# Patient Record
Sex: FEMALE | Race: WHITE | NOT HISPANIC OR LATINO | Employment: UNEMPLOYED | URBAN - NONMETROPOLITAN AREA
[De-identification: names, ages, dates, MRNs, and addresses within clinical notes are randomized per-mention and may not be internally consistent; named-entity substitution may affect disease eponyms.]

---

## 2017-02-09 ENCOUNTER — OFFICE VISIT (OUTPATIENT)
Dept: FAMILY MEDICINE CLINIC | Facility: CLINIC | Age: 62
End: 2017-02-09

## 2017-02-09 VITALS
HEIGHT: 65 IN | WEIGHT: 190 LBS | BODY MASS INDEX: 31.65 KG/M2 | DIASTOLIC BLOOD PRESSURE: 86 MMHG | OXYGEN SATURATION: 97 % | TEMPERATURE: 98.5 F | HEART RATE: 71 BPM | SYSTOLIC BLOOD PRESSURE: 146 MMHG

## 2017-02-09 DIAGNOSIS — B37.2 CANDIDIASIS, INTERTRIGINOUS: Primary | ICD-10-CM

## 2017-02-09 DIAGNOSIS — E03.4 HYPOTHYROIDISM DUE TO ACQUIRED ATROPHY OF THYROID: ICD-10-CM

## 2017-02-09 DIAGNOSIS — I10 BENIGN ESSENTIAL HYPERTENSION: ICD-10-CM

## 2017-02-09 DIAGNOSIS — E78.2 MIXED HYPERLIPIDEMIA: ICD-10-CM

## 2017-02-09 PROCEDURE — 99214 OFFICE O/P EST MOD 30 MIN: CPT | Performed by: PHYSICIAN ASSISTANT

## 2017-02-09 PROCEDURE — 90715 TDAP VACCINE 7 YRS/> IM: CPT | Performed by: PHYSICIAN ASSISTANT

## 2017-02-09 PROCEDURE — 90471 IMMUNIZATION ADMIN: CPT | Performed by: PHYSICIAN ASSISTANT

## 2017-02-09 RX ORDER — NYSTATIN 100000 U/G
CREAM TOPICAL 2 TIMES DAILY
Qty: 30 G | Refills: 2 | Status: SHIPPED | OUTPATIENT
Start: 2017-02-09 | End: 2017-10-30

## 2017-02-09 RX ORDER — BUPROPION HYDROCHLORIDE 150 MG/1
150 TABLET, EXTENDED RELEASE ORAL DAILY
COMMUNITY
End: 2017-06-09 | Stop reason: SDUPTHER

## 2017-02-09 RX ORDER — HYDROXYZINE PAMOATE 50 MG/1
50 CAPSULE ORAL 3 TIMES DAILY PRN
COMMUNITY
End: 2017-06-09 | Stop reason: SDUPTHER

## 2017-02-10 NOTE — PROGRESS NOTES
Kasandra Melchor is a 61 y.o. female.     History of Present Illness     Rash-  She complains of a rash under each breast that is worse in the winter.  Onset was one week ago.    Dyslipidemia  Compliance with treatment has been good. The patient exercises infrequently. She is currently being prescribed the following medication for her dyslipidemia - Zocor (simvastatin). Patient denies side effects associated with her medications. Most recent lipids include  No results found for: TRIG, HDL, LDLCALC, LDL  Stable    Hypertension-well controlled    Hypothyroidism-stable with Synthroid 100 µg daily, asymptomatic    The following portions of the patient's history were reviewed and updated as appropriate: allergies, current medications, past family history, past medical history, past social history, past surgical history and problem list.    Review of Systems   Constitutional: Negative for activity change, appetite change and fever.   HENT: Negative for ear pain, sinus pressure and sore throat.    Eyes: Negative for pain and visual disturbance.   Respiratory: Negative for cough and chest tightness.    Cardiovascular: Negative for chest pain and palpitations.   Gastrointestinal: Negative for abdominal pain, constipation, diarrhea, nausea and vomiting.   Endocrine: Negative for polydipsia and polyuria.   Genitourinary: Negative for dysuria and frequency.   Musculoskeletal: Negative for back pain and myalgias.   Skin: Positive for rash. Negative for color change.   Allergic/Immunologic: Negative for food allergies and immunocompromised state.   Neurological: Negative for dizziness, syncope and headaches.   Hematological: Negative for adenopathy. Does not bruise/bleed easily.   Psychiatric/Behavioral: Negative for hallucinations and suicidal ideas. The patient is not nervous/anxious.        Objective   Physical Exam   Constitutional: She is oriented to person, place, and time. She appears well-developed and  well-nourished.   HENT:   Head: Normocephalic and atraumatic.   Nose: Nose normal.   Mouth/Throat: Oropharynx is clear and moist.   Eyes: Conjunctivae and EOM are normal. Pupils are equal, round, and reactive to light.   Neck: Normal range of motion. Neck supple. No tracheal deviation present. No thyromegaly present.   Cardiovascular: Normal rate, regular rhythm, normal heart sounds and intact distal pulses.    No murmur heard.  Pulmonary/Chest: Effort normal and breath sounds normal. No respiratory distress. She has no wheezes.   Abdominal: Soft. Bowel sounds are normal. There is no tenderness. There is no guarding.   Musculoskeletal: Normal range of motion. She exhibits no edema or tenderness.   Lymphadenopathy:     She has no cervical adenopathy.   Neurological: She is alert and oriented to person, place, and time.   Skin: Skin is warm and dry. No rash noted.   Moist erythematous papules noted diffusely under each breast   Psychiatric: She has a normal mood and affect. Her behavior is normal.   Nursing note and vitals reviewed.      Assessment/Plan   Diagnoses and all orders for this visit:    Candidiasis, intertriginous  -     nystatin (MYCOSTATIN) 266551 UNIT/GM cream; Apply  topically 2 (Two) Times a Day.    Mixed hyperlipidemia  -     Lipid Panel; Future  -     Comprehensive Metabolic Panel; Future    Benign essential hypertension    Hypothyroidism due to acquired atrophy of thyroid  -     TSH; Future  -     T4, Free; Future    Other orders  -     Tdap Vaccine Greater Than or Equal To 6yo IM

## 2017-05-09 ENCOUNTER — OFFICE VISIT (OUTPATIENT)
Dept: FAMILY MEDICINE CLINIC | Facility: CLINIC | Age: 62
End: 2017-05-09

## 2017-05-09 VITALS
SYSTOLIC BLOOD PRESSURE: 120 MMHG | HEART RATE: 72 BPM | HEIGHT: 65 IN | BODY MASS INDEX: 30.82 KG/M2 | TEMPERATURE: 98.8 F | WEIGHT: 185 LBS | OXYGEN SATURATION: 97 % | DIASTOLIC BLOOD PRESSURE: 70 MMHG

## 2017-05-09 DIAGNOSIS — E03.4 HYPOTHYROIDISM DUE TO ACQUIRED ATROPHY OF THYROID: ICD-10-CM

## 2017-05-09 DIAGNOSIS — R55 SYNCOPE, UNSPECIFIED SYNCOPE TYPE: Primary | ICD-10-CM

## 2017-05-09 DIAGNOSIS — E78.49 OTHER HYPERLIPIDEMIA: ICD-10-CM

## 2017-05-09 DIAGNOSIS — I10 BENIGN ESSENTIAL HYPERTENSION: ICD-10-CM

## 2017-05-09 DIAGNOSIS — K21.9 GASTROESOPHAGEAL REFLUX DISEASE WITHOUT ESOPHAGITIS: ICD-10-CM

## 2017-05-09 DIAGNOSIS — E78.2 MIXED HYPERLIPIDEMIA: Primary | ICD-10-CM

## 2017-05-09 PROCEDURE — 99214 OFFICE O/P EST MOD 30 MIN: CPT | Performed by: PHYSICIAN ASSISTANT

## 2017-05-09 RX ORDER — SIMVASTATIN 40 MG
40 TABLET ORAL DAILY
Qty: 30 TABLET | Refills: 5 | Status: SHIPPED | OUTPATIENT
Start: 2017-05-09 | End: 2017-05-09

## 2017-05-09 RX ORDER — LEVOTHYROXINE SODIUM 0.1 MG/1
100 TABLET ORAL DAILY
Qty: 30 TABLET | Refills: 5 | Status: SHIPPED | OUTPATIENT
Start: 2017-05-09 | End: 2017-06-09 | Stop reason: SDUPTHER

## 2017-05-09 RX ORDER — RANITIDINE 150 MG/1
150 TABLET ORAL 2 TIMES DAILY
Qty: 60 TABLET | Refills: 5 | Status: SHIPPED | OUTPATIENT
Start: 2017-05-09 | End: 2017-05-22

## 2017-05-09 RX ORDER — LOSARTAN POTASSIUM 100 MG/1
100 TABLET ORAL DAILY
Qty: 30 TABLET | Refills: 5 | Status: SHIPPED | OUTPATIENT
Start: 2017-05-09 | End: 2017-06-09 | Stop reason: SDUPTHER

## 2017-05-09 RX ORDER — ROSUVASTATIN CALCIUM 20 MG/1
20 TABLET, COATED ORAL NIGHTLY
Qty: 30 TABLET | Refills: 5 | Status: SHIPPED | OUTPATIENT
Start: 2017-05-09 | End: 2017-06-09 | Stop reason: SDUPTHER

## 2017-05-11 ENCOUNTER — TELEPHONE (OUTPATIENT)
Dept: FAMILY MEDICINE CLINIC | Facility: CLINIC | Age: 62
End: 2017-05-11

## 2017-05-21 DIAGNOSIS — K21.9 GASTROESOPHAGEAL REFLUX DISEASE WITHOUT ESOPHAGITIS: ICD-10-CM

## 2017-05-22 RX ORDER — RANITIDINE 150 MG/1
TABLET ORAL
Qty: 60 TABLET | Refills: 0 | Status: SHIPPED | OUTPATIENT
Start: 2017-05-22 | End: 2017-06-09 | Stop reason: SDUPTHER

## 2017-05-24 ENCOUNTER — HOSPITAL ENCOUNTER (OUTPATIENT)
Dept: NUCLEAR MEDICINE | Facility: HOSPITAL | Age: 62
Discharge: HOME | End: 2017-05-24

## 2017-05-24 ENCOUNTER — HOSPITAL ENCOUNTER (OUTPATIENT)
Dept: CARDIOLOGY | Facility: HOSPITAL | Age: 62
Discharge: HOME | End: 2017-05-24

## 2017-05-24 VITALS — BODY MASS INDEX: 30.82 KG/M2 | WEIGHT: 185 LBS | HEIGHT: 65 IN

## 2017-05-24 DIAGNOSIS — R55 SYNCOPE, UNSPECIFIED SYNCOPE TYPE: ICD-10-CM

## 2017-05-24 PROCEDURE — 0 TECHNETIUM SESTAMIBI: Performed by: INTERNAL MEDICINE

## 2017-05-24 PROCEDURE — 78451 HT MUSCLE IMAGE SPECT SING: CPT | Performed by: INTERNAL MEDICINE

## 2017-05-24 PROCEDURE — A9500 TC99M SESTAMIBI: HCPCS | Performed by: INTERNAL MEDICINE

## 2017-05-24 PROCEDURE — 93018 CV STRESS TEST I&R ONLY: CPT | Performed by: INTERNAL MEDICINE

## 2017-05-24 PROCEDURE — 78451 HT MUSCLE IMAGE SPECT SING: CPT

## 2017-05-24 PROCEDURE — 93306 TTE W/DOPPLER COMPLETE: CPT | Performed by: INTERNAL MEDICINE

## 2017-05-24 PROCEDURE — 93017 CV STRESS TEST TRACING ONLY: CPT

## 2017-05-24 PROCEDURE — 0 TECHNETIUM SESTAMIBI: Performed by: PHYSICIAN ASSISTANT

## 2017-05-24 PROCEDURE — A9500 TC99M SESTAMIBI: HCPCS | Performed by: PHYSICIAN ASSISTANT

## 2017-05-24 PROCEDURE — 93306 TTE W/DOPPLER COMPLETE: CPT

## 2017-05-24 RX ADMIN — Medication 1 DOSE: at 08:00

## 2017-05-24 RX ADMIN — Medication 1 DOSE: at 09:55

## 2017-05-25 LAB
BH CV ECHO MEAS - % IVS THICK: 36.9 %
BH CV ECHO MEAS - % LVPW THICK: 68 %
BH CV ECHO MEAS - ACS: 2.1 CM
BH CV ECHO MEAS - AO ROOT AREA (BSA CORRECTED): 1.6
BH CV ECHO MEAS - AO ROOT AREA: 7 CM^2
BH CV ECHO MEAS - AO ROOT DIAM: 3 CM
BH CV ECHO MEAS - BSA(HAYCOCK): 2 M^2
BH CV ECHO MEAS - BSA: 1.9 M^2
BH CV ECHO MEAS - BZI_BMI: 30.8 KILOGRAMS/M^2
BH CV ECHO MEAS - BZI_METRIC_HEIGHT: 165.1 CM
BH CV ECHO MEAS - BZI_METRIC_WEIGHT: 83.9 KG
BH CV ECHO MEAS - CONTRAST EF 4CH: 65.8 ML/M^2
BH CV ECHO MEAS - EDV(CUBED): 128 ML
BH CV ECHO MEAS - EDV(MOD-SP4): 73 ML
BH CV ECHO MEAS - EDV(TEICH): 120.4 ML
BH CV ECHO MEAS - EF(CUBED): 84.7 %
BH CV ECHO MEAS - EF(MOD-SP4): 65.8 %
BH CV ECHO MEAS - EF(TEICH): 77.7 %
BH CV ECHO MEAS - ESV(CUBED): 19.5 ML
BH CV ECHO MEAS - ESV(MOD-SP4): 25 ML
BH CV ECHO MEAS - ESV(TEICH): 26.8 ML
BH CV ECHO MEAS - FS: 46.6 %
BH CV ECHO MEAS - IVS/LVPW: 1
BH CV ECHO MEAS - IVSD: 1 CM
BH CV ECHO MEAS - IVSS: 1.4 CM
BH CV ECHO MEAS - LA DIMENSION: 3.9 CM
BH CV ECHO MEAS - LA/AO: 1.3
BH CV ECHO MEAS - LV DIASTOLIC VOL/BSA (35-75): 38.1 ML/M^2
BH CV ECHO MEAS - LV MASS(C)D: 192.3 GRAMS
BH CV ECHO MEAS - LV MASS(C)DI: 100.5 GRAMS/M^2
BH CV ECHO MEAS - LV MASS(C)S: 149.9 GRAMS
BH CV ECHO MEAS - LV MASS(C)SI: 78.4 GRAMS/M^2
BH CV ECHO MEAS - LV SYSTOLIC VOL/BSA (12-30): 13.1 ML/M^2
BH CV ECHO MEAS - LVIDD: 5 CM
BH CV ECHO MEAS - LVIDS: 2.7 CM
BH CV ECHO MEAS - LVLD AP4: 7.4 CM
BH CV ECHO MEAS - LVLS AP4: 6.5 CM
BH CV ECHO MEAS - LVOT AREA (M): 3.1 CM^2
BH CV ECHO MEAS - LVOT AREA: 3.2 CM^2
BH CV ECHO MEAS - LVOT DIAM: 2 CM
BH CV ECHO MEAS - LVPWD: 1 CM
BH CV ECHO MEAS - LVPWS: 1.7 CM
BH CV ECHO MEAS - MV A MAX VEL: 83.9 CM/SEC
BH CV ECHO MEAS - MV E MAX VEL: 68.1 CM/SEC
BH CV ECHO MEAS - MV E/A: 0.81
BH CV ECHO MEAS - PA ACC SLOPE: 947.3 CM/SEC^2
BH CV ECHO MEAS - PA ACC TIME: 0.09 SEC
BH CV ECHO MEAS - PA PR(ACCEL): 39.4 MMHG
BH CV ECHO MEAS - RVDD: 1.5 CM
BH CV ECHO MEAS - SI(CUBED): 56.7 ML/M^2
BH CV ECHO MEAS - SI(MOD-SP4): 25.1 ML/M^2
BH CV ECHO MEAS - SI(TEICH): 48.9 ML/M^2
BH CV ECHO MEAS - SV(CUBED): 108.5 ML
BH CV ECHO MEAS - SV(MOD-SP4): 48 ML
BH CV ECHO MEAS - SV(TEICH): 93.6 ML
BH CV NUCLEAR PRIOR STUDY: 3
BH CV STRESS BP STAGE 1: NORMAL
BH CV STRESS BP STAGE 2: NORMAL
BH CV STRESS DURATION MIN STAGE 1: 3
BH CV STRESS DURATION MIN STAGE 2: 4
BH CV STRESS DURATION SEC STAGE 1: 0
BH CV STRESS DURATION SEC STAGE 2: 0
BH CV STRESS GRADE STAGE 1: 10
BH CV STRESS GRADE STAGE 2: 13
BH CV STRESS HR STAGE 1: 105
BH CV STRESS HR STAGE 2: 135
BH CV STRESS METS STAGE 1: 5
BH CV STRESS METS STAGE 2: 7.5
BH CV STRESS PROTOCOL 1: NORMAL
BH CV STRESS RECOVERY BP: NORMAL MMHG
BH CV STRESS RECOVERY HR: 74 BPM
BH CV STRESS SPEED STAGE 1: 1.7
BH CV STRESS SPEED STAGE 2: 2.8
BH CV STRESS STAGE 1: 1
BH CV STRESS STAGE 2: 2
MAXIMAL PREDICTED HEART RATE: 157 BPM
PERCENT MAX PREDICTED HR: 85.99 %
STRESS BASELINE BP: NORMAL MMHG
STRESS BASELINE HR: 57 BPM
STRESS PERCENT HR: 101 %
STRESS POST ESTIMATED WORKLOAD: 7.3 METS
STRESS POST EXERCISE DUR MIN: 7 MIN
STRESS POST EXERCISE DUR SEC: 0 SEC
STRESS POST PEAK BP: NORMAL MMHG
STRESS POST PEAK HR: 135 BPM
STRESS TARGET HR: 133 BPM

## 2017-05-30 ENCOUNTER — TELEPHONE (OUTPATIENT)
Dept: FAMILY MEDICINE CLINIC | Facility: CLINIC | Age: 62
End: 2017-05-30

## 2017-06-09 ENCOUNTER — OFFICE VISIT (OUTPATIENT)
Dept: FAMILY MEDICINE CLINIC | Facility: CLINIC | Age: 62
End: 2017-06-09

## 2017-06-09 VITALS
HEIGHT: 65 IN | TEMPERATURE: 98.5 F | WEIGHT: 184 LBS | OXYGEN SATURATION: 97 % | HEART RATE: 73 BPM | SYSTOLIC BLOOD PRESSURE: 140 MMHG | BODY MASS INDEX: 30.66 KG/M2 | DIASTOLIC BLOOD PRESSURE: 90 MMHG

## 2017-06-09 DIAGNOSIS — F51.01 PRIMARY INSOMNIA: ICD-10-CM

## 2017-06-09 DIAGNOSIS — K21.9 GASTROESOPHAGEAL REFLUX DISEASE WITHOUT ESOPHAGITIS: ICD-10-CM

## 2017-06-09 DIAGNOSIS — E78.2 MIXED HYPERLIPIDEMIA: ICD-10-CM

## 2017-06-09 DIAGNOSIS — E03.4 HYPOTHYROIDISM DUE TO ACQUIRED ATROPHY OF THYROID: ICD-10-CM

## 2017-06-09 DIAGNOSIS — I10 BENIGN ESSENTIAL HYPERTENSION: Primary | ICD-10-CM

## 2017-06-09 DIAGNOSIS — N95.9 MENOPAUSAL AND POSTMENOPAUSAL DISORDER: ICD-10-CM

## 2017-06-09 DIAGNOSIS — Z12.11 SCREENING FOR COLON CANCER: ICD-10-CM

## 2017-06-09 DIAGNOSIS — F41.1 GENERALIZED ANXIETY DISORDER: ICD-10-CM
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
## 2017-06-09 PROCEDURE — 99214 OFFICE O/P EST MOD 30 MIN: CPT | Performed by: PHYSICIAN ASSISTANT

## 2017-06-09 RX ORDER — LOSARTAN POTASSIUM 100 MG/1
100 TABLET ORAL DAILY
Qty: 30 TABLET | Refills: 5 | Status: SHIPPED | OUTPATIENT
Start: 2017-06-09 | End: 2017-07-24 | Stop reason: SDUPTHER

## 2017-06-09 RX ORDER — BUPROPION HYDROCHLORIDE 150 MG/1
150 TABLET, EXTENDED RELEASE ORAL DAILY
Qty: 30 TABLET | Refills: 5 | Status: SHIPPED | OUTPATIENT
Start: 2017-06-09 | End: 2017-07-24

## 2017-06-09 RX ORDER — TRAZODONE HYDROCHLORIDE 100 MG/1
100 TABLET ORAL NIGHTLY
Qty: 30 TABLET | Refills: 5 | Status: SHIPPED | OUTPATIENT
Start: 2017-06-09 | End: 2017-07-24 | Stop reason: SDUPTHER

## 2017-06-09 RX ORDER — RANITIDINE 150 MG/1
150 TABLET ORAL 2 TIMES DAILY
Qty: 60 TABLET | Refills: 5 | Status: SHIPPED | OUTPATIENT
Start: 2017-06-09 | End: 2017-07-24 | Stop reason: SDUPTHER

## 2017-06-09 RX ORDER — ROSUVASTATIN CALCIUM 20 MG/1
20 TABLET, COATED ORAL NIGHTLY
Qty: 30 TABLET | Refills: 5 | Status: SHIPPED | OUTPATIENT
Start: 2017-06-09 | End: 2017-07-24 | Stop reason: SDUPTHER

## 2017-06-09 RX ORDER — TRIAMCINOLONE ACETONIDE 5 MG/G
CREAM TOPICAL 3 TIMES DAILY
Qty: 30 G | Refills: 0 | Status: SHIPPED | OUTPATIENT
Start: 2017-06-09 | End: 2017-10-30

## 2017-06-09 RX ORDER — PAROXETINE 10 MG/1
10 TABLET, FILM COATED ORAL DAILY
Qty: 30 TABLET | Refills: 5 | Status: SHIPPED | OUTPATIENT
Start: 2017-06-09 | End: 2017-07-24

## 2017-06-09 RX ORDER — LEVOTHYROXINE SODIUM 0.1 MG/1
100 TABLET ORAL DAILY
Qty: 30 TABLET | Refills: 5 | Status: SHIPPED | OUTPATIENT
Start: 2017-06-09 | End: 2017-07-24 | Stop reason: SDUPTHER

## 2017-06-09 RX ORDER — HYDROXYZINE PAMOATE 50 MG/1
50 CAPSULE ORAL 3 TIMES DAILY PRN
Qty: 90 CAPSULE | Refills: 5 | Status: SHIPPED | OUTPATIENT
Start: 2017-06-09 | End: 2017-07-24 | Stop reason: SDUPTHER

## 2017-06-09 RX ORDER — SIMVASTATIN 40 MG
TABLET ORAL
COMMUNITY
Start: 2017-05-21 | End: 2017-06-09

## 2017-06-09 RX ORDER — CHLORTHALIDONE 25 MG/1
25 TABLET ORAL DAILY
Qty: 30 TABLET | Refills: 5 | Status: SHIPPED | OUTPATIENT
Start: 2017-06-09 | End: 2017-07-10

## 2017-06-09 NOTE — PROGRESS NOTES
Subjective   Genanupama Melchor is a 61 y.o. female.     History of Present Illness     Hypertension-  She complains of elevated blood pressure with associated headache for the past month.  Not at goal with losartan 100 mg daily.    Insect bite-  She reports that she goes outside and sits on the porch often and has gotten several insect bites that are itchy.  Onset was 2 days ago.  She has no urinary erythema fever or shortness breath or chest pain.    Hypothyroidism-stable with Synthroid 100 µg daily    Generalized anxiety disorder-stable with Wellbutrin 150 mg twice a day, Paxil 10 mg daily, Vistaril 50 mg 3 times a day, and trazodone 100 mg daily at bedtime    Dyslipidemia  Compliance with treatment has been good. The patient exercises infrequently. She is currently being prescribed the following medication for her dyslipidemia - rosuvastatin. Patient denies side effects associated with her medications. Most recent lipids include  No results found for: TRIG, HDL, LDLCALC, LDL  She was recently changed from Lipitor to Crestor and is due to have labs repeated before next visit.    Insomnia-stable with trazodone    Health maintenance-she declines the liquid bowel prep and states that she would prefer to do a occult blood testing ×3 in lieu of colonoscopy or virtual colonoscopy for colon cancer screening.    The following portions of the patient's history were reviewed and updated as appropriate: allergies, current medications, past family history, past medical history, past social history, past surgical history and problem list.    Review of Systems   Constitutional: Negative for activity change, appetite change and fever.   HENT: Negative for ear pain, sinus pressure and sore throat.    Eyes: Negative for pain and visual disturbance.   Respiratory: Negative for cough and chest tightness.    Cardiovascular: Negative for chest pain and palpitations.   Gastrointestinal: Negative for abdominal pain, constipation,  diarrhea, nausea and vomiting.   Endocrine: Negative for polydipsia and polyuria.   Genitourinary: Negative for dysuria and frequency.   Musculoskeletal: Negative for back pain and myalgias.   Skin: Positive for wound (bug bites). Negative for color change and rash.   Allergic/Immunologic: Negative for food allergies and immunocompromised state.   Neurological: Positive for dizziness and headaches. Negative for syncope.   Hematological: Negative for adenopathy. Does not bruise/bleed easily.   Psychiatric/Behavioral: Negative for hallucinations and suicidal ideas. The patient is not nervous/anxious.        Objective   Physical Exam   Constitutional: She is oriented to person, place, and time. She appears well-developed and well-nourished.   HENT:   Head: Normocephalic and atraumatic.   Nose: Nose normal.   Mouth/Throat: Oropharynx is clear and moist.   Eyes: Conjunctivae and EOM are normal. Pupils are equal, round, and reactive to light.   Neck: Normal range of motion. Neck supple. No tracheal deviation present. No thyromegaly present.   Cardiovascular: Normal rate, regular rhythm, normal heart sounds and intact distal pulses.    No murmur heard.  Pulmonary/Chest: Effort normal and breath sounds normal. No respiratory distress. She has no wheezes.   Abdominal: Soft. Bowel sounds are normal. There is no tenderness. There is no guarding.   Musculoskeletal: Normal range of motion. She exhibits no edema or tenderness.   Lymphadenopathy:     She has no cervical adenopathy.   Neurological: She is alert and oriented to person, place, and time.   Skin: Skin is warm and dry. No rash noted.   Several mildly erythematous papules noted diffusely consistent with insect bite   Psychiatric: She has a normal mood and affect. Her behavior is normal.   Nursing note and vitals reviewed.      Assessment/Plan   Diagnoses and all orders for this visit:    Benign essential hypertension  -     chlorthalidone (HYGROTON) 25 MG tablet; Take 1  tablet by mouth Daily.  -     losartan (COZAAR) 100 MG tablet; Take 1 tablet by mouth Daily.  -     Comprehensive Metabolic Panel; Future    Insect bite, subsequent encounter  -     triamcinolone (KENALOG) 0.5 % cream; Apply  topically 3 (Three) Times a Day.    Hypothyroidism due to acquired atrophy of thyroid  -     levothyroxine (SYNTHROID, LEVOTHROID) 100 MCG tablet; Take 1 tablet by mouth Daily.  -     TSH; Future  -     T4, Free; Future    Gastroesophageal reflux disease without esophagitis  -     raNITIdine (ZANTAC) 150 MG tablet; Take 1 tablet by mouth 2 (Two) Times a Day.    Generalized anxiety disorder  -     buPROPion SR (WELLBUTRIN SR) 150 MG 12 hr tablet; Take 1 tablet by mouth Daily.  -     hydrOXYzine (VISTARIL) 50 MG capsule; Take 1 capsule by mouth 3 (Three) Times a Day As Needed for Itching.    Menopausal and postmenopausal disorder  -     PARoxetine (PAXIL) 10 MG tablet; Take 1 tablet by mouth Daily.    Mixed hyperlipidemia  -     rosuvastatin (CRESTOR) 20 MG tablet; Take 1 tablet by mouth Every Night.  -     Lipid Panel; Future  -     Hemoglobin A1c; Future    Primary insomnia  -     traZODone (DESYREL) 100 MG tablet; Take 1 tablet by mouth Every Night.    Screening for colon cancer  -     Occult Blood X 3, Stool; Future    Start chlorthalidone  Stool guaiac ordered ×3 for colon cancer screening since patient declines colonoscopy or virtual colonoscopy

## 2017-06-15 ENCOUNTER — LAB (OUTPATIENT)
Dept: FAMILY MEDICINE CLINIC | Facility: CLINIC | Age: 62
End: 2017-06-15

## 2017-06-15 DIAGNOSIS — Z12.11 SCREENING FOR COLON CANCER: ICD-10-CM

## 2017-06-15 LAB
COLLECT DATE SP2 STL: NORMAL
COLLECT DATE SP3 STL: NORMAL
COLLECT DATE STL: NORMAL
GASTROCULT GAST QL: NEGATIVE
Lab: NORMAL

## 2017-06-15 PROCEDURE — 82272 OCCULT BLD FECES 1-3 TESTS: CPT | Performed by: PHYSICIAN ASSISTANT

## 2017-07-03 ENCOUNTER — LAB (OUTPATIENT)
Dept: FAMILY MEDICINE CLINIC | Facility: CLINIC | Age: 62
End: 2017-07-03

## 2017-07-03 DIAGNOSIS — I10 BENIGN ESSENTIAL HYPERTENSION: ICD-10-CM

## 2017-07-03 DIAGNOSIS — E78.2 MIXED HYPERLIPIDEMIA: ICD-10-CM

## 2017-07-03 DIAGNOSIS — E03.4 HYPOTHYROIDISM DUE TO ACQUIRED ATROPHY OF THYROID: ICD-10-CM

## 2017-07-03 LAB
ALBUMIN SERPL-MCNC: 4.9 G/DL (ref 3.4–4.8)
ALBUMIN/GLOB SERPL: 1.6 G/DL (ref 1.5–2.5)
ALP SERPL-CCNC: 93 U/L (ref 35–104)
ALT SERPL W P-5'-P-CCNC: 25 U/L (ref 10–36)
ANION GAP SERPL CALCULATED.3IONS-SCNC: 4.7 MMOL/L (ref 3.6–11.2)
AST SERPL-CCNC: 25 U/L (ref 10–30)
BILIRUB SERPL-MCNC: 0.5 MG/DL (ref 0.2–1.8)
BUN BLD-MCNC: 21 MG/DL (ref 7–21)
BUN/CREAT SERPL: 19.8 (ref 7–25)
CALCIUM SPEC-SCNC: 9.9 MG/DL (ref 7.7–10)
CHLORIDE SERPL-SCNC: 103 MMOL/L (ref 99–112)
CHOLEST SERPL-MCNC: 208 MG/DL (ref 0–200)
CO2 SERPL-SCNC: 31.3 MMOL/L (ref 24.3–31.9)
CREAT BLD-MCNC: 1.06 MG/DL (ref 0.43–1.29)
GFR SERPL CREATININE-BSD FRML MDRD: 52 ML/MIN/1.73
GLOBULIN UR ELPH-MCNC: 3.1 GM/DL
GLUCOSE BLD-MCNC: 102 MG/DL (ref 70–110)
HBA1C MFR BLD: 5.1 % (ref 4.5–5.7)
HDLC SERPL-MCNC: 63 MG/DL (ref 60–100)
LDLC SERPL CALC-MCNC: 114 MG/DL (ref 0–100)
LDLC/HDLC SERPL: 1.8 {RATIO}
OSMOLALITY SERPL CALC.SUM OF ELEC: 280.7 MOSM/KG (ref 273–305)
POTASSIUM BLD-SCNC: 3.7 MMOL/L (ref 3.5–5.3)
PROT SERPL-MCNC: 8 G/DL (ref 6–8)
SODIUM BLD-SCNC: 139 MMOL/L (ref 135–153)
T4 FREE SERPL-MCNC: 1.3 NG/DL (ref 0.89–1.76)
TRIGL SERPL-MCNC: 157 MG/DL (ref 0–150)
TSH SERPL DL<=0.05 MIU/L-ACNC: 5.33 MIU/ML (ref 0.55–4.78)
VLDLC SERPL-MCNC: 31.4 MG/DL

## 2017-07-03 PROCEDURE — 84439 ASSAY OF FREE THYROXINE: CPT | Performed by: PHYSICIAN ASSISTANT

## 2017-07-03 PROCEDURE — 82607 VITAMIN B-12: CPT | Performed by: NURSE PRACTITIONER

## 2017-07-03 PROCEDURE — 83036 HEMOGLOBIN GLYCOSYLATED A1C: CPT | Performed by: PHYSICIAN ASSISTANT

## 2017-07-03 PROCEDURE — 84403 ASSAY OF TOTAL TESTOSTERONE: CPT | Performed by: NURSE PRACTITIONER

## 2017-07-03 PROCEDURE — 82306 VITAMIN D 25 HYDROXY: CPT | Performed by: NURSE PRACTITIONER

## 2017-07-03 PROCEDURE — 82672 ASSAY OF ESTROGEN: CPT | Performed by: NURSE PRACTITIONER

## 2017-07-03 PROCEDURE — 80061 LIPID PANEL: CPT | Performed by: PHYSICIAN ASSISTANT

## 2017-07-03 PROCEDURE — 36415 COLL VENOUS BLD VENIPUNCTURE: CPT

## 2017-07-03 PROCEDURE — 80053 COMPREHEN METABOLIC PANEL: CPT | Performed by: PHYSICIAN ASSISTANT

## 2017-07-03 PROCEDURE — 84144 ASSAY OF PROGESTERONE: CPT | Performed by: NURSE PRACTITIONER

## 2017-07-03 PROCEDURE — 84443 ASSAY THYROID STIM HORMONE: CPT | Performed by: PHYSICIAN ASSISTANT

## 2017-07-10 ENCOUNTER — OFFICE VISIT (OUTPATIENT)
Dept: FAMILY MEDICINE CLINIC | Facility: CLINIC | Age: 62
End: 2017-07-10

## 2017-07-10 VITALS
SYSTOLIC BLOOD PRESSURE: 118 MMHG | HEART RATE: 88 BPM | OXYGEN SATURATION: 96 % | TEMPERATURE: 98.5 F | WEIGHT: 182 LBS | DIASTOLIC BLOOD PRESSURE: 74 MMHG | HEIGHT: 65 IN | BODY MASS INDEX: 30.32 KG/M2

## 2017-07-10 DIAGNOSIS — N95.1 HOT FLASHES DUE TO MENOPAUSE: ICD-10-CM

## 2017-07-10 DIAGNOSIS — Z12.11 SCREENING FOR COLON CANCER: ICD-10-CM

## 2017-07-10 DIAGNOSIS — I10 BENIGN ESSENTIAL HYPERTENSION: ICD-10-CM

## 2017-07-10 DIAGNOSIS — K21.00 GASTROESOPHAGEAL REFLUX DISEASE WITH ESOPHAGITIS: Primary | ICD-10-CM

## 2017-07-10 DIAGNOSIS — R42 VERTIGO: ICD-10-CM

## 2017-07-10 PROCEDURE — 99214 OFFICE O/P EST MOD 30 MIN: CPT | Performed by: PHYSICIAN ASSISTANT

## 2017-07-10 RX ORDER — CLONIDINE HYDROCHLORIDE 0.1 MG/1
0.1 TABLET ORAL
Qty: 30 TABLET | Refills: 5 | Status: SHIPPED | OUTPATIENT
Start: 2017-07-10 | End: 2017-07-24

## 2017-07-10 RX ORDER — OMEPRAZOLE 40 MG/1
40 CAPSULE, DELAYED RELEASE ORAL DAILY
Qty: 30 CAPSULE | Refills: 5 | Status: SHIPPED | OUTPATIENT
Start: 2017-07-10 | End: 2017-07-24

## 2017-07-10 NOTE — PROGRESS NOTES
Subjective   Genople Jill is a 61 y.o. female.     History of Present Illness     Gastroesophageal reflux disease-  She complains of very easy gag reflex when she is brushing her teeth, flatulence in the form of belching frequently, acidic taste in her mouth, and associated nausea intermittently for the past 5 years.  Minimal relief with Zantac.  Not at goal    Syncope-  She complains of lightheadedness when she changes positions sense April 2017.  She reports that she had this prior to starting chlorthalidone for hypertension.  She states that approximately 2 weeks ago she had a syncopal episode at home where she passed out.  She was by herself but states she is sure that she did not have a seizure.  She did not seek medical care at that time.  Not at goal    Hot flashes-  She reports that she is postmenopausal for the past 10 years.  She had hormone level drawn in the form of estrogen, progesterone, and testosterone which were significant for postmenopausal levels.  She complains of hot flashes and night sweats.  Not at goal    Hypertension-stable with current medications    The following portions of the patient's history were reviewed and updated as appropriate: allergies, current medications, past family history, past medical history, past social history, past surgical history and problem list.    Review of Systems   Constitutional: Negative for activity change, appetite change and fever.   HENT: Negative for ear pain, sinus pressure and sore throat.    Eyes: Negative for pain and visual disturbance.   Respiratory: Negative for cough and chest tightness.    Cardiovascular: Negative for chest pain and palpitations.   Gastrointestinal: Positive for nausea. Negative for abdominal pain, constipation, diarrhea and vomiting.        Flatulence   Endocrine: Negative for polydipsia and polyuria.   Genitourinary: Negative for dysuria and frequency.   Musculoskeletal: Negative for back pain and myalgias.   Skin:  Negative for color change and rash.   Allergic/Immunologic: Negative for food allergies and immunocompromised state.   Neurological: Positive for dizziness and syncope. Negative for headaches.   Hematological: Negative for adenopathy. Does not bruise/bleed easily.   Psychiatric/Behavioral: Negative for hallucinations and suicidal ideas. The patient is not nervous/anxious.        Objective   Physical Exam   Constitutional: She is oriented to person, place, and time. She appears well-developed and well-nourished.   HENT:   Head: Normocephalic and atraumatic.   Nose: Nose normal.   Mouth/Throat: Oropharynx is clear and moist.   Eyes: Conjunctivae and EOM are normal. Pupils are equal, round, and reactive to light.   Neck: Normal range of motion. Neck supple. No tracheal deviation present. No thyromegaly present.   Cardiovascular: Normal rate, regular rhythm, normal heart sounds and intact distal pulses.    No murmur heard.  Pulmonary/Chest: Effort normal and breath sounds normal. No respiratory distress. She has no wheezes.   Abdominal: Soft. Bowel sounds are normal. There is no tenderness. There is no guarding.   Musculoskeletal: Normal range of motion. She exhibits no edema or tenderness.   Lymphadenopathy:     She has no cervical adenopathy.   Neurological: She is alert and oriented to person, place, and time.   Skin: Skin is warm and dry. No rash noted.   Psychiatric: She has a normal mood and affect. Her behavior is normal.   Nursing note and vitals reviewed.      Assessment/Plan   Diagnoses and all orders for this visit:    Gastroesophageal reflux disease with esophagitis  -     omeprazole (priLOSEC) 40 MG capsule; Take 1 capsule by mouth Daily.  -     Cancel: Ambulatory Referral to Gastroenterology  -     Ambulatory Referral to Gastroenterology    Screening for colon cancer  -     Cancel: Ambulatory Referral For Screening Colonoscopy  -     Ambulatory Referral to Gastroenterology    Vertigo  -     Ambulatory  Referral to ENT (Otolaryngology)    Benign essential hypertension    Hot flashes due to menopause  -     CloNIDine (CATAPRES) 0.1 MG tablet; Take 1 tablet by mouth every night at bedtime.    Discontinue Zantac  Start omeprazole    Discontinue chlorthalidone  Start clonidine for hot flashes  She was advised to keep a home blood pressure and pulse log and bring it with her next visit in 2 weeks' time or sooner if needed.

## 2017-07-17 ENCOUNTER — TELEPHONE (OUTPATIENT)
Dept: FAMILY MEDICINE CLINIC | Facility: CLINIC | Age: 62
End: 2017-07-17

## 2017-07-17 NOTE — TELEPHONE ENCOUNTER
----- Message from EDD Vargas sent at 7/14/2017 12:57 PM EDT -----  Regarding: RE: Pt called about post menopausal med.   The clonidine is for post menopausal symptoms.      ----- Message -----     From: Abdiaziz Alcantara MA     Sent: 7/11/2017  10:46 AM       To: EDD Vargas  Subject: Pt called about post menopausal med.             Pt called and said you were going to send in something for post menopausal issues to her pharmacy. I only saw  Omeprazole and Clonidine that was RX'd yesterday. Can you please advise on this for them.  Thanks  Abdiaziz ROSAS

## 2017-07-24 ENCOUNTER — OFFICE VISIT (OUTPATIENT)
Dept: FAMILY MEDICINE CLINIC | Facility: CLINIC | Age: 62
End: 2017-07-24

## 2017-07-24 VITALS
SYSTOLIC BLOOD PRESSURE: 120 MMHG | OXYGEN SATURATION: 95 % | HEIGHT: 65 IN | WEIGHT: 184 LBS | DIASTOLIC BLOOD PRESSURE: 74 MMHG | BODY MASS INDEX: 30.66 KG/M2 | TEMPERATURE: 98.3 F | HEART RATE: 89 BPM

## 2017-07-24 DIAGNOSIS — F41.1 GENERALIZED ANXIETY DISORDER: ICD-10-CM

## 2017-07-24 DIAGNOSIS — I10 BENIGN ESSENTIAL HYPERTENSION: ICD-10-CM

## 2017-07-24 DIAGNOSIS — K21.9 GASTROESOPHAGEAL REFLUX DISEASE WITHOUT ESOPHAGITIS: ICD-10-CM

## 2017-07-24 DIAGNOSIS — E03.4 HYPOTHYROIDISM DUE TO ACQUIRED ATROPHY OF THYROID: ICD-10-CM

## 2017-07-24 DIAGNOSIS — E78.2 MIXED HYPERLIPIDEMIA: ICD-10-CM

## 2017-07-24 DIAGNOSIS — F51.01 PRIMARY INSOMNIA: ICD-10-CM

## 2017-07-24 DIAGNOSIS — R51.9 CHRONIC NONINTRACTABLE HEADACHE, UNSPECIFIED HEADACHE TYPE: Primary | ICD-10-CM

## 2017-07-24 DIAGNOSIS — G89.29 CHRONIC NONINTRACTABLE HEADACHE, UNSPECIFIED HEADACHE TYPE: Primary | ICD-10-CM

## 2017-07-24 PROCEDURE — 99214 OFFICE O/P EST MOD 30 MIN: CPT | Performed by: PHYSICIAN ASSISTANT

## 2017-07-24 RX ORDER — TRAZODONE HYDROCHLORIDE 100 MG/1
100 TABLET ORAL NIGHTLY
Qty: 30 TABLET | Refills: 5 | Status: SHIPPED | OUTPATIENT
Start: 2017-07-24 | End: 2017-10-30 | Stop reason: SDUPTHER

## 2017-07-24 RX ORDER — HYDROXYZINE PAMOATE 50 MG/1
50 CAPSULE ORAL 3 TIMES DAILY PRN
Qty: 90 CAPSULE | Refills: 5 | Status: SHIPPED | OUTPATIENT
Start: 2017-07-24 | End: 2017-10-30 | Stop reason: SDUPTHER

## 2017-07-24 RX ORDER — LEVOTHYROXINE SODIUM 0.1 MG/1
100 TABLET ORAL DAILY
Qty: 30 TABLET | Refills: 5 | Status: SHIPPED | OUTPATIENT
Start: 2017-07-24 | End: 2017-10-30 | Stop reason: SDUPTHER

## 2017-07-24 RX ORDER — RANITIDINE 150 MG/1
150 TABLET ORAL 2 TIMES DAILY
Qty: 60 TABLET | Refills: 5 | Status: SHIPPED | OUTPATIENT
Start: 2017-07-24 | End: 2017-08-08 | Stop reason: SDUPTHER

## 2017-07-24 RX ORDER — LOSARTAN POTASSIUM 100 MG/1
100 TABLET ORAL DAILY
Qty: 30 TABLET | Refills: 5 | Status: SHIPPED | OUTPATIENT
Start: 2017-07-24 | End: 2017-10-30 | Stop reason: SDUPTHER

## 2017-07-24 RX ORDER — ROSUVASTATIN CALCIUM 20 MG/1
20 TABLET, COATED ORAL NIGHTLY
Qty: 30 TABLET | Refills: 5 | Status: SHIPPED | OUTPATIENT
Start: 2017-07-24 | End: 2017-10-30

## 2017-07-24 NOTE — PROGRESS NOTES
Kasandra   Genople Jill is a 61 y.o. female.     History of Present Illness     Headache-  She complains of intermittent mild to moderate pressure in the frontal sinus, bilateral temples and top of her head daily lasting for several minutes.  She does have associated dizziness.  She reports syncopal episodes 3 times for which she did not seek medical treatment at that time.  Onset was April 2017.  No known injury.    Generalized anxiety disorder-stable with hydroxyzine    Primary insomnia-stable with trazodone    Dyslipidemia  Compliance with treatment has been good. The patient exercises intermittently. She is currently being prescribed the following medication for her dyslipidemia - rosuvastatin. Patient denies side effects associated with her medications. Most recent lipids include  Lab Results   Component Value Date    TRIG 157 (H) 07/03/2017    HDL 63 07/03/2017    LDLCALC 114 (H) 07/03/2017   Stable    Gastroesophageal reflux disease-stable with Zantac.  She reports that she discontinued omeprazole secondary to dry mouth.    Hypertension-stable    Hypothyroidism-well controlled with Synthroid 100 µg daily    Hot flashes-she reports that she discontinued clonidine because it did not help with her hot flashes.  She is not interested in any other medications at this time.  She declines referral to GYN.    The following portions of the patient's history were reviewed and updated as appropriate: allergies, current medications, past family history, past medical history, past social history, past surgical history and problem list.    Review of Systems   Constitutional: Negative for activity change, appetite change and fever.   HENT: Negative for ear pain, sinus pressure and sore throat.    Eyes: Negative for pain and visual disturbance.   Respiratory: Negative for cough and chest tightness.    Cardiovascular: Negative for chest pain and palpitations.   Gastrointestinal: Negative for abdominal pain, constipation,  diarrhea, nausea and vomiting.   Endocrine: Negative for polydipsia and polyuria.   Genitourinary: Negative for dysuria and frequency.   Musculoskeletal: Negative for back pain and myalgias.   Skin: Negative for color change and rash.   Allergic/Immunologic: Negative for food allergies and immunocompromised state.   Neurological: Positive for dizziness, syncope and headaches.   Hematological: Negative for adenopathy. Does not bruise/bleed easily.   Psychiatric/Behavioral: Negative for hallucinations and suicidal ideas. The patient is not nervous/anxious.        Objective   Physical Exam   Constitutional: She is oriented to person, place, and time. She appears well-developed and well-nourished.   HENT:   Head: Normocephalic and atraumatic.   Nose: Nose normal.   Mouth/Throat: Oropharynx is clear and moist.   Eyes: Conjunctivae and EOM are normal. Pupils are equal, round, and reactive to light.   Neck: Normal range of motion. Neck supple. No tracheal deviation present. No thyromegaly present.   Cardiovascular: Normal rate, regular rhythm, normal heart sounds and intact distal pulses.    No murmur heard.  Pulmonary/Chest: Effort normal and breath sounds normal. No respiratory distress. She has no wheezes.   Abdominal: Soft. Bowel sounds are normal. There is no tenderness. There is no guarding.   Musculoskeletal: Normal range of motion. She exhibits no edema or tenderness.   Lymphadenopathy:     She has no cervical adenopathy.   Neurological: She is alert and oriented to person, place, and time.   Skin: Skin is warm and dry. No rash noted.   Psychiatric: She has a normal mood and affect. Her behavior is normal.   Nursing note and vitals reviewed.      Assessment/Plan   Diagnoses and all orders for this visit:    Chronic nonintractable headache, unspecified headache type  -     CT Head Without Contrast; Future    Generalized anxiety disorder  -     hydrOXYzine (VISTARIL) 50 MG capsule; Take 1 capsule by mouth 3 (Three)  Times a Day As Needed for Anxiety.    Primary insomnia  -     traZODone (DESYREL) 100 MG tablet; Take 1 tablet by mouth Every Night.    Mixed hyperlipidemia  -     rosuvastatin (CRESTOR) 20 MG tablet; Take 1 tablet by mouth Every Night.    Gastroesophageal reflux disease without esophagitis  -     raNITIdine (ZANTAC) 150 MG tablet; Take 1 tablet by mouth 2 (Two) Times a Day.    Benign essential hypertension  -     losartan (COZAAR) 100 MG tablet; Take 1 tablet by mouth Daily.    Hypothyroidism due to acquired atrophy of thyroid  -     levothyroxine (SYNTHROID, LEVOTHROID) 100 MCG tablet; Take 1 tablet by mouth Daily.

## 2017-07-25 ENCOUNTER — TELEPHONE (OUTPATIENT)
Dept: FAMILY MEDICINE CLINIC | Facility: CLINIC | Age: 62
End: 2017-07-25

## 2017-07-25 NOTE — TELEPHONE ENCOUNTER
---I called [facility] and they faxed mammogram spot compression and u/s        -- Message from EDD Vargas sent at 7/24/2017  2:53 PM EDT -----   Request reports on her mammogram/ultrasound done at [facility]

## 2017-08-08 ENCOUNTER — CONSULT (OUTPATIENT)
Dept: GASTROENTEROLOGY | Facility: CLINIC | Age: 62
End: 2017-08-08

## 2017-08-08 ENCOUNTER — HOSPITAL ENCOUNTER (OUTPATIENT)
Dept: GENERAL RADIOLOGY | Facility: HOSPITAL | Age: 62
Discharge: HOME | End: 2017-08-08
Admitting: PHYSICIAN ASSISTANT

## 2017-08-08 VITALS
OXYGEN SATURATION: 98 % | SYSTOLIC BLOOD PRESSURE: 144 MMHG | DIASTOLIC BLOOD PRESSURE: 81 MMHG | WEIGHT: 184.2 LBS | HEART RATE: 63 BPM | BODY MASS INDEX: 30.69 KG/M2 | HEIGHT: 65 IN

## 2017-08-08 DIAGNOSIS — Z83.71 FAMILY HISTORY OF POLYPS IN THE COLON: ICD-10-CM

## 2017-08-08 DIAGNOSIS — R14.2 BELCHING: ICD-10-CM

## 2017-08-08 DIAGNOSIS — R14.1 ABDOMINAL GAS PAIN: ICD-10-CM

## 2017-08-08 DIAGNOSIS — R06.6 HICCUPS: Primary | ICD-10-CM

## 2017-08-08 DIAGNOSIS — K21.9 GASTROESOPHAGEAL REFLUX DISEASE WITHOUT ESOPHAGITIS: ICD-10-CM

## 2017-08-08 DIAGNOSIS — Z80.0 FAMILY HISTORY OF COLON CANCER: ICD-10-CM

## 2017-08-08 PROCEDURE — 99214 OFFICE O/P EST MOD 30 MIN: CPT | Performed by: PHYSICIAN ASSISTANT

## 2017-08-08 PROCEDURE — 74020 HC XR ABDOMEN FLAT & UPRIGHT: CPT

## 2017-08-08 PROCEDURE — 74020 XR ABDOMEN FLAT AND UPRIGHT: CPT | Performed by: RADIOLOGY

## 2017-08-08 RX ORDER — RANITIDINE 150 MG/1
300 TABLET ORAL 2 TIMES DAILY
Qty: 60 TABLET | Refills: 2 | Status: SHIPPED | OUTPATIENT
Start: 2017-08-08 | End: 2017-10-30 | Stop reason: SDUPTHER

## 2017-08-08 NOTE — PROGRESS NOTES
Chief Complaint   Patient presents with   • Nausea   • Gas   • Constipation     June Melchor is a 61 y.o. female who presents to the office today as a consultation at the request of EDD Vargas for evaluation of GERD and colorectal cancer screening.    HPI  Her last EGD and colonoscopy were about 20 years ago. She has never had colon polyps. Her brother recently had colon polyp that was cancerous. Her other brother had benign colon polyps.     She reports hiccups which are intermittent and occurring about 2-3 times per week. Also, she complains of belching and flatulence that is severe at times. This has been present for years. Bowel movements are occurring every day with occasional skip days (1). She has hypothyroidism. When she feels constipated, she takes OTC probiotics daily and tries to maintain a high fiber diet. Stools are hard at times. GERD has been present for years but heartburn only occurs intermittently. Zantac 150 mg is taken once daily. She recalls taking another medicaiton for GERD in the past but it caused dry mouth and didn't help with her GERD.     Review of Systems   Constitutional: Positive for fatigue.   HENT: Negative.    Eyes: Negative.    Respiratory: Negative.    Cardiovascular: Negative.    Gastrointestinal: Positive for abdominal distention, abdominal pain, constipation and nausea. Negative for anal bleeding, blood in stool, diarrhea, rectal pain and vomiting.   Endocrine: Positive for heat intolerance.   Genitourinary: Negative.    Musculoskeletal: Positive for arthralgias.   Skin: Negative.    Allergic/Immunologic: Negative.    Neurological: Positive for syncope and light-headedness.   Hematological: Bruises/bleeds easily.   Psychiatric/Behavioral: Positive for sleep disturbance. The patient is nervous/anxious.        Specialty Problems        Gastroenterology Problems    Gastroesophageal reflux disease without esophagitis              Past Medical History:   Diagnosis Date  "  • Anxiety disorder    • Anxious depression    • Esophageal reflux    • Hyperlipidemia    • Hypertension    • Hyperthyroidism    • Hypothyroid    • Insomnia        Past Surgical History:   Procedure Laterality Date   • COLONOSCOPY     • ENDOSCOPY     • TUBAL ABDOMINAL LIGATION         Family History   Problem Relation Age of Onset   • Heart disease Other    • Cancer Other    • Diabetes Other    • Asthma Other    • Stroke Other    • Stroke Mother    • Hypertension Mother    • Diabetes Mother    • Heart attack Father    • Hypertension Father    • Prostate cancer Brother    • Colon polyps Brother    • Colon polyps Brother        Social History   Substance Use Topics   • Smoking status: Former Smoker   • Smokeless tobacco: Never Used   • Alcohol use No       CURRENT MEDICATION:  •  hydrOXYzine (VISTARIL) 50 MG capsule, Take 1 capsule by mouth 3 (Three) Times a Day As Needed for Anxiety., Disp: 90 capsule, Rfl: 5  •  levothyroxine (SYNTHROID, LEVOTHROID) 100 MCG tablet, Take 1 tablet by mouth Daily., Disp: 30 tablet, Rfl: 5  •  losartan (COZAAR) 100 MG tablet, Take 1 tablet by mouth Daily., Disp: 30 tablet, Rfl: 5  •  nystatin (MYCOSTATIN) 339742 UNIT/GM cream, Apply  topically 2 (Two) Times a Day., Disp: 30 g, Rfl: 2  •  raNITIdine (ZANTAC) 150 MG tablet, Take 1 tablet by mouth 2 (Two) Times a Day., Disp: 60 tablet, Rfl: 5  •  rosuvastatin (CRESTOR) 20 MG tablet, Take 1 tablet by mouth Every Night., Disp: 30 tablet, Rfl: 5  •  traZODone (DESYREL) 100 MG tablet, Take 1 tablet by mouth Every Night., Disp: 30 tablet, Rfl: 5  •  triamcinolone (KENALOG) 0.5 % cream, Apply  topically 3 (Three) Times a Day., Disp: 30 g, Rfl: 0    ALLERGIES:  Amoxicillin and Ampicillin    VISIT VITALS:  /81  Pulse 63  Ht 65\" (165.1 cm)  Wt 184 lb 3.2 oz (83.6 kg)  SpO2 98%  BMI 30.65 kg/m2    Physical Exam   Constitutional: She is oriented to person, place, and time. She appears well-developed and well-nourished. No distress. "   HENT:   Head: Normocephalic and atraumatic.   Right Ear: External ear normal.   Left Ear: External ear normal.   Nose: Nose normal.   Mouth/Throat: Oropharynx is clear and moist.   Eyes: Conjunctivae and EOM are normal. Right eye exhibits no discharge. Left eye exhibits no discharge. No scleral icterus.   Neck: Normal range of motion. Neck supple.   Cardiovascular: Normal rate, regular rhythm and normal heart sounds.  Exam reveals no gallop and no friction rub.    No murmur heard.  Pulmonary/Chest: Effort normal and breath sounds normal. No respiratory distress. She has no wheezes. She has no rales. She exhibits no tenderness.   Abdominal: Soft. Normal appearance and bowel sounds are normal. She exhibits no distension, no ascites and no mass. There is no tenderness. There is no rigidity and no guarding. No hernia.   Musculoskeletal: Normal range of motion. She exhibits no edema or deformity.   Neurological: She is alert and oriented to person, place, and time. She exhibits normal muscle tone. Coordination normal.   Skin: Skin is warm and dry. No rash noted. No erythema. No pallor.   Psychiatric: She has a normal mood and affect. Her behavior is normal. Judgment and thought content normal.   Nursing note and vitals reviewed.      Assessment/Plan     1. Hiccups    2. Gastroesophageal reflux disease without esophagitis    3. Belching    4. Abdominal gas pain    5. Family history of colon cancer    6. Family history of polyps in the colon      She will need an esophagogastroduodenoscopy and colonoscopy performed with IV general sedation. All of the risks, benefits and alternatives of these procedures have been discussed with her, all of her questions have been answered and she has elected to proceed. She should follow up in the office after these procedures to discuss the results and further recommendations can be made at that time.    Abdominal film has been ordered due to current complaints of abdominal pain and  belching.    For GERD, she will increase Zantac from 150 mg once daily to 300 mg BID.           Return for next scheduled follow up after procedure.       Lina Calhoun PA-C       Electronically signed 8/16/2017 at 2:27 PM.

## 2017-08-09 ENCOUNTER — TELEPHONE (OUTPATIENT)
Dept: GASTROENTEROLOGY | Facility: CLINIC | Age: 62
End: 2017-08-09

## 2017-08-09 ENCOUNTER — HOSPITAL ENCOUNTER (OUTPATIENT)
Facility: HOSPITAL | Age: 62
Setting detail: HOSPITAL OUTPATIENT SURGERY
End: 2017-08-09
Attending: INTERNAL MEDICINE | Admitting: INTERNAL MEDICINE

## 2017-08-09 DIAGNOSIS — K59.04 CHRONIC IDIOPATHIC CONSTIPATION: Primary | ICD-10-CM

## 2017-08-09 PROBLEM — Z80.0 FAMILY HISTORY OF COLON CANCER: Status: ACTIVE | Noted: 2017-08-09

## 2017-08-09 PROBLEM — Z83.719 FAMILY HISTORY OF POLYPS IN THE COLON: Status: ACTIVE | Noted: 2017-08-09

## 2017-08-09 PROBLEM — R14.1 ABDOMINAL GAS PAIN: Status: ACTIVE | Noted: 2017-08-09

## 2017-08-09 PROBLEM — R06.6 HICCUPS: Status: ACTIVE | Noted: 2017-08-09

## 2017-08-09 PROBLEM — Z83.71 FAMILY HISTORY OF POLYPS IN THE COLON: Status: ACTIVE | Noted: 2017-08-09

## 2017-08-09 PROBLEM — R14.2 BELCHING: Status: ACTIVE | Noted: 2017-08-09

## 2017-08-09 RX ORDER — LUBIPROSTONE 24 UG/1
24 CAPSULE ORAL 2 TIMES DAILY WITH MEALS
Qty: 60 CAPSULE | Refills: 5 | Status: SHIPPED | OUTPATIENT
Start: 2017-08-09 | End: 2017-10-30

## 2017-08-09 NOTE — TELEPHONE ENCOUNTER
I reviewed patient's abdominal film. There is a large amount of stool on the right side. I see that her procedure is not until October. Please ask her to complete clean out when she can with magnesium citrate then I would like her to try Amitiza 24 mcg BID with meals starting the day after clean out until her procedure. Remind her that she will still need clean out before the procedure as well. Thanks!

## 2017-08-10 ENCOUNTER — TELEPHONE (OUTPATIENT)
Dept: GASTROENTEROLOGY | Facility: CLINIC | Age: 62
End: 2017-08-10

## 2017-08-10 NOTE — TELEPHONE ENCOUNTER
X-ray of abdomen was normal.  Please schedule OV if she is having problems and/or desires further discussion.  VERA

## 2017-08-11 NOTE — TELEPHONE ENCOUNTER
Spoke with patient and let her know results from her x-ray. I let her know to do a clean out now and to also do a clean out for her procedure. I let her know that she had amitiza at her pharmacy and to take that until she has her procedure.

## 2017-10-05 ENCOUNTER — DOCUMENTATION (OUTPATIENT)
Dept: GASTROENTEROLOGY | Facility: CLINIC | Age: 62
End: 2017-10-05

## 2017-10-05 NOTE — PROGRESS NOTES
"Patient called and was very upset stating that someone called from the hospital to do per-op with her and told her she would need someone to accompany her to the hospital (which I said yes you do need to have someone to come with you) she said that she did not have anyone and we needed to just cancel \"her out\". I tried to talk to her and ask if she could get a friend or anyone to come with her but, she said no.  "

## 2017-10-30 ENCOUNTER — OFFICE VISIT (OUTPATIENT)
Dept: FAMILY MEDICINE CLINIC | Facility: CLINIC | Age: 62
End: 2017-10-30

## 2017-10-30 VITALS
OXYGEN SATURATION: 97 % | TEMPERATURE: 98.4 F | HEIGHT: 65 IN | WEIGHT: 187 LBS | SYSTOLIC BLOOD PRESSURE: 130 MMHG | BODY MASS INDEX: 31.16 KG/M2 | HEART RATE: 77 BPM | DIASTOLIC BLOOD PRESSURE: 84 MMHG

## 2017-10-30 DIAGNOSIS — I10 BENIGN ESSENTIAL HYPERTENSION: ICD-10-CM

## 2017-10-30 DIAGNOSIS — F51.01 PRIMARY INSOMNIA: ICD-10-CM

## 2017-10-30 DIAGNOSIS — E03.4 HYPOTHYROIDISM DUE TO ACQUIRED ATROPHY OF THYROID: ICD-10-CM

## 2017-10-30 DIAGNOSIS — F41.1 GENERALIZED ANXIETY DISORDER: ICD-10-CM

## 2017-10-30 DIAGNOSIS — R92.8 ABNORMAL MAMMOGRAM OF LEFT BREAST: Primary | ICD-10-CM

## 2017-10-30 DIAGNOSIS — E78.2 MIXED HYPERLIPIDEMIA: ICD-10-CM

## 2017-10-30 DIAGNOSIS — K58.1 IRRITABLE BOWEL SYNDROME WITH CONSTIPATION: ICD-10-CM

## 2017-10-30 DIAGNOSIS — K21.9 GASTROESOPHAGEAL REFLUX DISEASE WITHOUT ESOPHAGITIS: ICD-10-CM

## 2017-10-30 PROCEDURE — 99214 OFFICE O/P EST MOD 30 MIN: CPT | Performed by: PHYSICIAN ASSISTANT

## 2017-10-30 RX ORDER — LOSARTAN POTASSIUM 100 MG/1
100 TABLET ORAL DAILY
Qty: 30 TABLET | Refills: 5 | Status: SHIPPED | OUTPATIENT
Start: 2017-10-30 | End: 2018-02-14 | Stop reason: SDUPTHER

## 2017-10-30 RX ORDER — ROSUVASTATIN CALCIUM 40 MG/1
40 TABLET, COATED ORAL NIGHTLY
Qty: 30 TABLET | Refills: 5 | Status: SHIPPED | OUTPATIENT
Start: 2017-10-30 | End: 2018-01-18

## 2017-10-30 RX ORDER — TRAZODONE HYDROCHLORIDE 100 MG/1
100 TABLET ORAL NIGHTLY
Qty: 30 TABLET | Refills: 5 | Status: SHIPPED | OUTPATIENT
Start: 2017-10-30 | End: 2017-12-08 | Stop reason: SDUPTHER

## 2017-10-30 RX ORDER — RANITIDINE 150 MG/1
300 TABLET ORAL 2 TIMES DAILY
Qty: 60 TABLET | Refills: 5 | Status: SHIPPED | OUTPATIENT
Start: 2017-10-30 | End: 2018-02-14 | Stop reason: SDUPTHER

## 2017-10-30 RX ORDER — HYDROXYZINE PAMOATE 50 MG/1
50 CAPSULE ORAL 3 TIMES DAILY PRN
Qty: 90 CAPSULE | Refills: 5 | Status: SHIPPED | OUTPATIENT
Start: 2017-10-30 | End: 2018-06-18 | Stop reason: SDUPTHER

## 2017-10-30 RX ORDER — LEVOTHYROXINE SODIUM 0.1 MG/1
100 TABLET ORAL DAILY
Qty: 30 TABLET | Refills: 5 | Status: SHIPPED | OUTPATIENT
Start: 2017-10-30 | End: 2018-01-18

## 2017-10-30 NOTE — PROGRESS NOTES
Kasandra Melchor is a 62 y.o. female.     History of Present Illness     Abnormal mammogram-  She complains of abnormal mammogram in February 2017 at [facility].  She was advised to repeat mammogram in 6 months but when she returned their equipment was not functioning properly.    Constipation-  She complains of chronic constipation.  She has had problems with constipation intermittently for several years.  Minimal relief with Colace and over-the-counter MiraLAX.  Not at goal    Dyslipidemia  Compliance with treatment has been good. The patient exercises intermittently. She is currently being prescribed the following medication for her dyslipidemia - rosuvastatin. Patient denies side effects associated with her medications. Most recent lipids include  Lab Results   Component Value Date    TRIG 157 (H) 07/03/2017    HDL 63 07/03/2017    LDLCALC 114 (H) 07/03/2017   Not at goal    Generalized anxiety disorder-stable with trazodone    Hypothyroidism-stable with Synthroid 100 µg daily    Hypertension-stable with losartan 100 mg daily    The following portions of the patient's history were reviewed and updated as appropriate: allergies, current medications, past family history, past medical history, past social history, past surgical history and problem list.    Review of Systems   Constitutional: Negative for activity change, appetite change and fever.   HENT: Negative for ear pain, sinus pressure and sore throat.    Eyes: Negative for pain and visual disturbance.   Respiratory: Negative for cough and chest tightness.    Cardiovascular: Negative for chest pain and palpitations.   Gastrointestinal: Positive for constipation. Negative for abdominal pain, diarrhea, nausea and vomiting.   Endocrine: Negative for polydipsia and polyuria.   Genitourinary: Negative for dysuria and frequency.   Musculoskeletal: Negative for back pain and myalgias.   Skin: Negative for color change and rash.    Allergic/Immunologic: Negative for food allergies and immunocompromised state.   Neurological: Negative for dizziness, syncope and headaches.   Hematological: Negative for adenopathy. Does not bruise/bleed easily.   Psychiatric/Behavioral: Negative for hallucinations and suicidal ideas. The patient is not nervous/anxious.        Objective   Physical Exam   Constitutional: She is oriented to person, place, and time. She appears well-developed and well-nourished.   HENT:   Head: Normocephalic and atraumatic.   Nose: Nose normal.   Mouth/Throat: Oropharynx is clear and moist.   Eyes: Conjunctivae and EOM are normal. Pupils are equal, round, and reactive to light.   Neck: Normal range of motion. Neck supple. No tracheal deviation present. No thyromegaly present.   Cardiovascular: Normal rate, regular rhythm, normal heart sounds and intact distal pulses.    No murmur heard.  Pulmonary/Chest: Effort normal and breath sounds normal. No respiratory distress. She has no wheezes.   Abdominal: Soft. Bowel sounds are normal. There is no tenderness. There is no guarding.   Musculoskeletal: Normal range of motion. She exhibits no edema or tenderness.   Lymphadenopathy:     She has no cervical adenopathy.   Neurological: She is alert and oriented to person, place, and time.   Skin: Skin is warm and dry. No rash noted.   Psychiatric: She has a normal mood and affect. Her behavior is normal.   Nursing note and vitals reviewed.      Assessment/Plan   Diagnoses and all orders for this visit:    Abnormal mammogram of left breast  -     Mammo diagnostic digital tomosynthesis left w CAD; Future  -     US breast left complete; Future    Irritable bowel syndrome with constipation  Comments:  Start Linzess  Orders:  -     linaclotide (LINZESS) 145 MCG capsule capsule; 1 every morning before breakfast    Mixed hyperlipidemia  Comments:  Increase Crestor 40 mg daily  Orders:  -     rosuvastatin (CRESTOR) 40 MG tablet; Take 1 tablet by mouth  Every Night.    Generalized anxiety disorder  -     hydrOXYzine (VISTARIL) 50 MG capsule; Take 1 capsule by mouth 3 (Three) Times a Day As Needed for Anxiety.    Hypothyroidism due to acquired atrophy of thyroid  -     levothyroxine (SYNTHROID, LEVOTHROID) 100 MCG tablet; Take 1 tablet by mouth Daily.    Benign essential hypertension  -     losartan (COZAAR) 100 MG tablet; Take 1 tablet by mouth Daily.    Gastroesophageal reflux disease without esophagitis  -     raNITIdine (ZANTAC) 150 MG tablet; Take 2 tablets by mouth 2 (Two) Times a Day.    Primary insomnia  -     traZODone (DESYREL) 100 MG tablet; Take 1 tablet by mouth Every Night.

## 2017-11-22 ENCOUNTER — HOSPITAL ENCOUNTER (OUTPATIENT)
Dept: ULTRASOUND IMAGING | Facility: HOSPITAL | Age: 62
Discharge: HOME | End: 2017-11-22

## 2017-11-22 ENCOUNTER — HOSPITAL ENCOUNTER (OUTPATIENT)
Dept: MAMMOGRAPHY | Facility: HOSPITAL | Age: 62
Discharge: HOME | End: 2017-11-22

## 2017-11-22 ENCOUNTER — DOCUMENTATION (OUTPATIENT)
Dept: FAMILY MEDICINE CLINIC | Facility: CLINIC | Age: 62
End: 2017-11-22

## 2017-11-22 DIAGNOSIS — R92.8 ABNORMAL MAMMOGRAM OF LEFT BREAST: ICD-10-CM

## 2017-12-04 ENCOUNTER — HOSPITAL ENCOUNTER (OUTPATIENT)
Dept: MAMMOGRAPHY | Facility: HOSPITAL | Age: 62
Discharge: HOME | End: 2017-12-04
Admitting: PHYSICIAN ASSISTANT

## 2017-12-04 ENCOUNTER — HOSPITAL ENCOUNTER (OUTPATIENT)
Dept: ULTRASOUND IMAGING | Facility: HOSPITAL | Age: 62
End: 2017-12-04

## 2017-12-04 PROCEDURE — 77065 DX MAMMO INCL CAD UNI: CPT | Performed by: RADIOLOGY

## 2017-12-04 PROCEDURE — G0279 TOMOSYNTHESIS, MAMMO: HCPCS

## 2017-12-04 PROCEDURE — 77061 BREAST TOMOSYNTHESIS UNI: CPT | Performed by: RADIOLOGY

## 2017-12-04 PROCEDURE — G0206 DX MAMMO INCL CAD UNI: HCPCS

## 2017-12-08 ENCOUNTER — OFFICE VISIT (OUTPATIENT)
Dept: FAMILY MEDICINE CLINIC | Facility: CLINIC | Age: 62
End: 2017-12-08

## 2017-12-08 VITALS
TEMPERATURE: 97.9 F | SYSTOLIC BLOOD PRESSURE: 144 MMHG | DIASTOLIC BLOOD PRESSURE: 72 MMHG | WEIGHT: 189.6 LBS | HEART RATE: 77 BPM | HEIGHT: 65 IN | BODY MASS INDEX: 31.59 KG/M2 | OXYGEN SATURATION: 97 %

## 2017-12-08 DIAGNOSIS — I10 BENIGN ESSENTIAL HYPERTENSION: ICD-10-CM

## 2017-12-08 DIAGNOSIS — F51.01 PRIMARY INSOMNIA: Primary | ICD-10-CM

## 2017-12-08 DIAGNOSIS — E78.2 MIXED HYPERLIPIDEMIA: ICD-10-CM

## 2017-12-08 DIAGNOSIS — K21.9 GASTROESOPHAGEAL REFLUX DISEASE WITHOUT ESOPHAGITIS: ICD-10-CM

## 2017-12-08 PROCEDURE — 99214 OFFICE O/P EST MOD 30 MIN: CPT | Performed by: PHYSICIAN ASSISTANT

## 2017-12-08 RX ORDER — PHENAZOPYRIDINE HYDROCHLORIDE 200 MG/1
TABLET, FILM COATED ORAL
COMMUNITY
Start: 2017-10-18 | End: 2017-12-08

## 2017-12-08 RX ORDER — TRAZODONE HYDROCHLORIDE 100 MG/1
200 TABLET ORAL NIGHTLY
Qty: 60 TABLET | Refills: 5 | Status: SHIPPED | OUTPATIENT
Start: 2017-12-08 | End: 2018-06-18 | Stop reason: SDUPTHER

## 2017-12-11 NOTE — PROGRESS NOTES
Kasandra Melchor is a 62 y.o. female.     History of Present Illness     Trouble sleeping-  She complains of inability to go to sleep and stay to sleep.  Not at goal trazodone 100 mg daily at bedtime.  Onset over 6 months ago.  Not at goal    Hypertension-stable.  She reports home blood pressure readings less than 140/90 consistently.  Controlled with losartan 100 mg daily    Dyslipidemia  Compliance with treatment has been good. The patient exercises intermittently. She is currently being prescribed the following medication for her dyslipidemia - rosuvastatin. Patient denies side effects associated with her medications. Most recent lipids include  Lab Results   Component Value Date    TRIG 157 (H) 07/03/2017    HDL 63 07/03/2017    LDLCALC 114 (H) 07/03/2017   Not at goal    Gastroesophageal reflux disease-stable with Zantac    The following portions of the patient's history were reviewed and updated as appropriate: allergies, current medications, past family history, past medical history, past social history, past surgical history and problem list.    Review of Systems   Constitutional: Negative for activity change, appetite change and fever.   HENT: Negative for ear pain, sinus pressure and sore throat.    Eyes: Negative for pain and visual disturbance.   Respiratory: Negative for cough and chest tightness.    Cardiovascular: Negative for chest pain and palpitations.   Gastrointestinal: Negative for abdominal pain, constipation, diarrhea, nausea and vomiting.   Endocrine: Negative for polydipsia and polyuria.   Genitourinary: Negative for dysuria and frequency.   Musculoskeletal: Negative for back pain and myalgias.   Skin: Negative for color change and rash.   Allergic/Immunologic: Negative for food allergies and immunocompromised state.   Neurological: Negative for dizziness, syncope and headaches.   Hematological: Negative for adenopathy. Does not bruise/bleed easily.   Psychiatric/Behavioral:  Positive for sleep disturbance. Negative for hallucinations and suicidal ideas. The patient is not nervous/anxious.        Objective   Physical Exam   Constitutional: She is oriented to person, place, and time. She appears well-developed and well-nourished.   HENT:   Head: Normocephalic and atraumatic.   Nose: Nose normal.   Mouth/Throat: Oropharynx is clear and moist.   Eyes: Conjunctivae and EOM are normal. Pupils are equal, round, and reactive to light.   Neck: Normal range of motion. Neck supple. No tracheal deviation present. No thyromegaly present.   Cardiovascular: Normal rate, regular rhythm, normal heart sounds and intact distal pulses.    No murmur heard.  Pulmonary/Chest: Effort normal and breath sounds normal. No respiratory distress. She has no wheezes.   Abdominal: Soft. Bowel sounds are normal. There is no tenderness. There is no guarding.   Musculoskeletal: Normal range of motion. She exhibits no edema or tenderness.   Lymphadenopathy:     She has no cervical adenopathy.   Neurological: She is alert and oriented to person, place, and time.   Skin: Skin is warm and dry. No rash noted.   Psychiatric: She has a normal mood and affect. Her behavior is normal.   Nursing note and vitals reviewed.      Assessment/Plan   Diagnoses and all orders for this visit:    Primary insomnia  Comments:  Increase trazodone to 200 mg daily at bedtime  Orders:  -     traZODone (DESYREL) 100 MG tablet; Take 2 tablets by mouth Every Night.    Benign essential hypertension    Mixed hyperlipidemia  Comments:  She was advised follow low cholesterol diet and continue her Crestor.    Gastroesophageal reflux disease without esophagitis

## 2017-12-18 DIAGNOSIS — E03.4 HYPOTHYROIDISM DUE TO ACQUIRED ATROPHY OF THYROID: ICD-10-CM

## 2017-12-18 DIAGNOSIS — E78.2 MIXED HYPERLIPIDEMIA: ICD-10-CM

## 2017-12-19 RX ORDER — ROSUVASTATIN CALCIUM 20 MG/1
TABLET, COATED ORAL
Qty: 30 TABLET | Refills: 0 | Status: SHIPPED | OUTPATIENT
Start: 2017-12-19 | End: 2018-01-18

## 2017-12-19 RX ORDER — LEVOTHYROXINE SODIUM 0.1 MG/1
TABLET ORAL
Qty: 30 TABLET | Refills: 0 | Status: SHIPPED | OUTPATIENT
Start: 2017-12-19 | End: 2018-01-18

## 2017-12-29 ENCOUNTER — TELEPHONE (OUTPATIENT)
Dept: FAMILY MEDICINE CLINIC | Facility: CLINIC | Age: 62
End: 2017-12-29

## 2017-12-29 DIAGNOSIS — K59.00 CONSTIPATION, UNSPECIFIED CONSTIPATION TYPE: Primary | ICD-10-CM

## 2017-12-29 NOTE — TELEPHONE ENCOUNTER
----- Message from EDD Vargas sent at 12/29/2017 12:07 PM EST -----  Regarding: RE: Pt had Linzess denied on [place]  Please inform the patient that she must try lactulose and a cycling prior to insurance company feeling Linzess.  I have sent a prescription for her to try lactulose to her pharmacy.  She should try it for 30 days and please call in a reports symptoms unless she wants to be seen in the office visit discussing face-to-face.    ----- Message -----     From: Abdiaziz Alcantara MA     Sent: 12/22/2017   9:44 AM       To: EDD Vargas  Subject: RE: Pt had Linzess denied on [place]                  Pt said she has only taken over the counter meds but they cramped really bad. She is doing well with Linzess. Insurance is saying she has to try and fail some RX'd med for the chronic constipation.  Please advise  PKF  ----- Message -----     From: EDD Vargas     Sent: 12/22/2017   9:11 AM       To: Abdiaziz Alcantara MA  Subject: RE: Pt had Linzess denied on [place]                  Has she tried both lactulose and dicyclomine?    ----- Message -----     From: Abdiaziz Alcantara MA     Sent: 11/29/2017   1:41 PM       To: EDD Vargas  Subject: Pt had Linzess denied on [place]                      Tried to [place] linzess. Pt was needing documentation that they have tried Lactulose and Dicyclomine. They will need a tried and failed before we can get the linzess covered.  Thanks  Abdiaziz GERMAN        Called pt and let her know to  the Lactulose and try to take it. She is to contact the office and let us know how she is doing with this med. She can do a face to face office visit. DEBORAHF

## 2018-01-18 DIAGNOSIS — E03.4 HYPOTHYROIDISM DUE TO ACQUIRED ATROPHY OF THYROID: ICD-10-CM

## 2018-01-18 DIAGNOSIS — E78.2 MIXED HYPERLIPIDEMIA: ICD-10-CM

## 2018-01-18 RX ORDER — LEVOTHYROXINE SODIUM 0.1 MG/1
TABLET ORAL
Qty: 30 TABLET | Refills: 0 | Status: SHIPPED | OUTPATIENT
Start: 2018-01-18 | End: 2018-02-14 | Stop reason: SDUPTHER

## 2018-01-18 RX ORDER — ROSUVASTATIN CALCIUM 20 MG/1
TABLET, COATED ORAL
Qty: 30 TABLET | Refills: 0 | Status: SHIPPED | OUTPATIENT
Start: 2018-01-18 | End: 2018-02-14 | Stop reason: SDUPTHER

## 2018-02-14 DIAGNOSIS — I10 BENIGN ESSENTIAL HYPERTENSION: ICD-10-CM

## 2018-02-14 DIAGNOSIS — K21.9 GASTROESOPHAGEAL REFLUX DISEASE WITHOUT ESOPHAGITIS: ICD-10-CM

## 2018-02-14 DIAGNOSIS — E78.2 MIXED HYPERLIPIDEMIA: ICD-10-CM

## 2018-02-14 DIAGNOSIS — E03.4 HYPOTHYROIDISM DUE TO ACQUIRED ATROPHY OF THYROID: ICD-10-CM

## 2018-02-15 RX ORDER — RANITIDINE 150 MG/1
300 TABLET ORAL 2 TIMES DAILY
Qty: 60 TABLET | Refills: 5 | Status: SHIPPED | OUTPATIENT
Start: 2018-02-15 | End: 2018-06-22 | Stop reason: SDUPTHER

## 2018-02-15 RX ORDER — ROSUVASTATIN CALCIUM 20 MG/1
20 TABLET, COATED ORAL NIGHTLY
Qty: 30 TABLET | Refills: 0 | Status: SHIPPED | OUTPATIENT
Start: 2018-02-15 | End: 2018-06-22 | Stop reason: SDUPTHER

## 2018-02-15 RX ORDER — LEVOTHYROXINE SODIUM 0.1 MG/1
100 TABLET ORAL DAILY
Qty: 30 TABLET | Refills: 5 | Status: SHIPPED | OUTPATIENT
Start: 2018-02-15 | End: 2018-06-22 | Stop reason: SDUPTHER

## 2018-02-15 RX ORDER — LOSARTAN POTASSIUM 100 MG/1
100 TABLET ORAL DAILY
Qty: 30 TABLET | Refills: 5 | Status: SHIPPED | OUTPATIENT
Start: 2018-02-15 | End: 2018-06-22 | Stop reason: SDUPTHER

## 2018-05-07 ENCOUNTER — APPOINTMENT (OUTPATIENT)
Dept: MAMMOGRAPHY | Facility: HOSPITAL | Age: 63
End: 2018-05-07

## 2018-05-09 ENCOUNTER — HOSPITAL ENCOUNTER (OUTPATIENT)
Dept: MAMMOGRAPHY | Facility: HOSPITAL | Age: 63
Discharge: HOME | End: 2018-05-09
Admitting: FAMILY MEDICINE

## 2018-05-09 DIAGNOSIS — Z12.39 BREAST CANCER SCREENING: ICD-10-CM

## 2018-05-09 PROCEDURE — 77063 BREAST TOMOSYNTHESIS BI: CPT | Performed by: RADIOLOGY

## 2018-05-09 PROCEDURE — 77067 SCR MAMMO BI INCL CAD: CPT | Performed by: RADIOLOGY

## 2018-05-09 PROCEDURE — 77067 SCR MAMMO BI INCL CAD: CPT

## 2018-05-09 PROCEDURE — 77063 BREAST TOMOSYNTHESIS BI: CPT

## 2018-05-10 ENCOUNTER — TELEPHONE (OUTPATIENT)
Dept: FAMILY MEDICINE CLINIC | Facility: CLINIC | Age: 63
End: 2018-05-10

## 2018-05-10 NOTE — TELEPHONE ENCOUNTER
I called [facility] informed her mammogram was normal                                  ----- Message from EDD Vargas sent at 5/10/2018  8:26 AM EDT -----  Inform her of normal mammogram

## 2018-06-18 DIAGNOSIS — F51.01 PRIMARY INSOMNIA: ICD-10-CM

## 2018-06-18 DIAGNOSIS — F41.1 GENERALIZED ANXIETY DISORDER: ICD-10-CM

## 2018-06-18 RX ORDER — HYDROXYZINE PAMOATE 50 MG/1
CAPSULE ORAL
Qty: 90 CAPSULE | Refills: 0 | Status: SHIPPED | OUTPATIENT
Start: 2018-06-18 | End: 2018-06-22 | Stop reason: SDUPTHER

## 2018-06-18 RX ORDER — TRAZODONE HYDROCHLORIDE 100 MG/1
200 TABLET ORAL NIGHTLY
Qty: 60 TABLET | Refills: 0 | Status: SHIPPED | OUTPATIENT
Start: 2018-06-18 | End: 2018-06-22 | Stop reason: SDUPTHER

## 2018-06-22 ENCOUNTER — OFFICE VISIT (OUTPATIENT)
Dept: FAMILY MEDICINE CLINIC | Facility: CLINIC | Age: 63
End: 2018-06-22

## 2018-06-22 VITALS
DIASTOLIC BLOOD PRESSURE: 60 MMHG | WEIGHT: 192 LBS | HEART RATE: 68 BPM | HEIGHT: 65 IN | OXYGEN SATURATION: 99 % | BODY MASS INDEX: 31.99 KG/M2 | TEMPERATURE: 98.6 F | SYSTOLIC BLOOD PRESSURE: 114 MMHG

## 2018-06-22 DIAGNOSIS — I10 BENIGN ESSENTIAL HYPERTENSION: ICD-10-CM

## 2018-06-22 DIAGNOSIS — F41.1 GENERALIZED ANXIETY DISORDER: ICD-10-CM

## 2018-06-22 DIAGNOSIS — E78.2 MIXED HYPERLIPIDEMIA: ICD-10-CM

## 2018-06-22 DIAGNOSIS — E03.4 HYPOTHYROIDISM DUE TO ACQUIRED ATROPHY OF THYROID: Primary | ICD-10-CM

## 2018-06-22 DIAGNOSIS — F51.01 PRIMARY INSOMNIA: ICD-10-CM

## 2018-06-22 DIAGNOSIS — K21.9 GASTROESOPHAGEAL REFLUX DISEASE WITHOUT ESOPHAGITIS: ICD-10-CM

## 2018-06-22 LAB
ALBUMIN SERPL-MCNC: 4.7 G/DL (ref 3.4–4.8)
ALBUMIN/GLOB SERPL: 1.7 G/DL (ref 1.5–2.5)
ALP SERPL-CCNC: 104 U/L (ref 35–104)
ALT SERPL W P-5'-P-CCNC: 25 U/L (ref 10–36)
ANION GAP SERPL CALCULATED.3IONS-SCNC: 10.2 MMOL/L (ref 3.6–11.2)
AST SERPL-CCNC: 28 U/L (ref 10–30)
BASOPHILS # BLD AUTO: 0.02 10*3/MM3 (ref 0–0.3)
BASOPHILS NFR BLD AUTO: 0.4 % (ref 0–2)
BILIRUB SERPL-MCNC: 0.4 MG/DL (ref 0.2–1.8)
BUN BLD-MCNC: 17 MG/DL (ref 7–21)
BUN/CREAT SERPL: 16.7 (ref 7–25)
CALCIUM SPEC-SCNC: 9.5 MG/DL (ref 7.7–10)
CHLORIDE SERPL-SCNC: 104 MMOL/L (ref 99–112)
CHOLEST SERPL-MCNC: 210 MG/DL (ref 0–200)
CO2 SERPL-SCNC: 23.8 MMOL/L (ref 24.3–31.9)
CREAT BLD-MCNC: 1.02 MG/DL (ref 0.43–1.29)
DEPRECATED RDW RBC AUTO: 37.2 FL (ref 37–54)
EOSINOPHIL # BLD AUTO: 0.19 10*3/MM3 (ref 0–0.7)
EOSINOPHIL NFR BLD AUTO: 3.4 % (ref 0–5)
ERYTHROCYTE [DISTWIDTH] IN BLOOD BY AUTOMATED COUNT: 11.9 % (ref 11.5–14.5)
GFR SERPL CREATININE-BSD FRML MDRD: 55 ML/MIN/1.73
GLOBULIN UR ELPH-MCNC: 2.8 GM/DL
GLUCOSE BLD-MCNC: 120 MG/DL (ref 70–110)
HCT VFR BLD AUTO: 41.4 % (ref 37–47)
HDLC SERPL-MCNC: 58 MG/DL (ref 60–100)
HGB BLD-MCNC: 14.6 G/DL (ref 12–16)
IMM GRANULOCYTES # BLD: 0.01 10*3/MM3 (ref 0–0.03)
IMM GRANULOCYTES NFR BLD: 0.2 % (ref 0–0.5)
LDLC SERPL CALC-MCNC: ABNORMAL MG/DL (ref 0–100)
LDLC/HDLC SERPL: ABNORMAL {RATIO}
LYMPHOCYTES # BLD AUTO: 1.87 10*3/MM3 (ref 1–3)
LYMPHOCYTES NFR BLD AUTO: 33 % (ref 21–51)
MCH RBC QN AUTO: 30.5 PG (ref 27–33)
MCHC RBC AUTO-ENTMCNC: 35.3 G/DL (ref 33–37)
MCV RBC AUTO: 86.6 FL (ref 80–94)
MONOCYTES # BLD AUTO: 0.42 10*3/MM3 (ref 0.1–0.9)
MONOCYTES NFR BLD AUTO: 7.4 % (ref 0–10)
NEUTROPHILS # BLD AUTO: 3.15 10*3/MM3 (ref 1.4–6.5)
NEUTROPHILS NFR BLD AUTO: 55.6 % (ref 30–70)
OSMOLALITY SERPL CALC.SUM OF ELEC: 278.4 MOSM/KG (ref 273–305)
PLATELET # BLD AUTO: 176 10*3/MM3 (ref 130–400)
PMV BLD AUTO: 10 FL (ref 6–10)
POTASSIUM BLD-SCNC: 4.1 MMOL/L (ref 3.5–5.3)
PROT SERPL-MCNC: 7.5 G/DL (ref 6–8)
RBC # BLD AUTO: 4.78 10*6/MM3 (ref 4.2–5.4)
SODIUM BLD-SCNC: 138 MMOL/L (ref 135–153)
T4 FREE SERPL-MCNC: 1.46 NG/DL (ref 0.89–1.76)
TRIGL SERPL-MCNC: 503 MG/DL (ref 0–150)
TSH SERPL DL<=0.05 MIU/L-ACNC: 0.48 MIU/ML (ref 0.55–4.78)
VLDLC SERPL-MCNC: ABNORMAL MG/DL
WBC NRBC COR # BLD: 5.66 10*3/MM3 (ref 4.5–12.5)

## 2018-06-22 PROCEDURE — 84439 ASSAY OF FREE THYROXINE: CPT | Performed by: PHYSICIAN ASSISTANT

## 2018-06-22 PROCEDURE — 80061 LIPID PANEL: CPT | Performed by: PHYSICIAN ASSISTANT

## 2018-06-22 PROCEDURE — 99214 OFFICE O/P EST MOD 30 MIN: CPT | Performed by: PHYSICIAN ASSISTANT

## 2018-06-22 PROCEDURE — 80050 GENERAL HEALTH PANEL: CPT | Performed by: PHYSICIAN ASSISTANT

## 2018-06-22 RX ORDER — HYDROXYZINE PAMOATE 50 MG/1
50 CAPSULE ORAL 3 TIMES DAILY PRN
Qty: 90 CAPSULE | Refills: 5 | Status: SHIPPED | OUTPATIENT
Start: 2018-06-22 | End: 2018-12-07

## 2018-06-22 RX ORDER — RANITIDINE 150 MG/1
300 TABLET ORAL 2 TIMES DAILY
Qty: 60 TABLET | Refills: 5 | Status: SHIPPED | OUTPATIENT
Start: 2018-06-22 | End: 2018-12-07

## 2018-06-22 RX ORDER — ROSUVASTATIN CALCIUM 20 MG/1
20 TABLET, COATED ORAL NIGHTLY
Qty: 30 TABLET | Refills: 5 | Status: SHIPPED | OUTPATIENT
Start: 2018-06-22 | End: 2018-12-07 | Stop reason: SDUPTHER

## 2018-06-22 RX ORDER — TRAZODONE HYDROCHLORIDE 100 MG/1
200 TABLET ORAL NIGHTLY
Qty: 60 TABLET | Refills: 5 | Status: SHIPPED | OUTPATIENT
Start: 2018-06-22 | End: 2018-12-07 | Stop reason: SDUPTHER

## 2018-06-22 RX ORDER — LOSARTAN POTASSIUM 100 MG/1
100 TABLET ORAL DAILY
Qty: 30 TABLET | Refills: 5 | Status: SHIPPED | OUTPATIENT
Start: 2018-06-22 | End: 2018-12-07 | Stop reason: SDUPTHER

## 2018-06-22 RX ORDER — LEVOTHYROXINE SODIUM 0.1 MG/1
100 TABLET ORAL DAILY
Qty: 30 TABLET | Refills: 5 | Status: SHIPPED | OUTPATIENT
Start: 2018-06-22 | End: 2018-12-07 | Stop reason: SDUPTHER

## 2018-06-25 DIAGNOSIS — E78.2 MIXED HYPERLIPIDEMIA: Primary | ICD-10-CM

## 2018-06-25 RX ORDER — FENOFIBRATE 145 MG/1
145 TABLET, COATED ORAL DAILY
Qty: 30 TABLET | Refills: 5 | Status: SHIPPED | OUTPATIENT
Start: 2018-06-25 | End: 2018-12-07 | Stop reason: SDUPTHER

## 2018-06-25 NOTE — PROGRESS NOTES
I've added another cholesterol medicine called fenofibrate.  Please inform her to take fenofibrate with her Crestor and recheck labs in 8 weeks fasting.

## 2018-08-21 ENCOUNTER — LAB (OUTPATIENT)
Dept: FAMILY MEDICINE CLINIC | Facility: CLINIC | Age: 63
End: 2018-08-21

## 2018-08-21 DIAGNOSIS — E78.2 MIXED HYPERLIPIDEMIA: ICD-10-CM

## 2018-08-21 LAB
ALBUMIN SERPL-MCNC: 4.7 G/DL (ref 3.4–4.8)
ALBUMIN/GLOB SERPL: 1.8 G/DL (ref 1.5–2.5)
ALP SERPL-CCNC: 68 U/L (ref 35–104)
ALT SERPL W P-5'-P-CCNC: 30 U/L (ref 10–36)
ANION GAP SERPL CALCULATED.3IONS-SCNC: 4.3 MMOL/L (ref 3.6–11.2)
AST SERPL-CCNC: 32 U/L (ref 10–30)
BILIRUB SERPL-MCNC: 0.6 MG/DL (ref 0.2–1.8)
BUN BLD-MCNC: 20 MG/DL (ref 7–21)
BUN/CREAT SERPL: 20.4 (ref 7–25)
CALCIUM SPEC-SCNC: 9.6 MG/DL (ref 7.7–10)
CHLORIDE SERPL-SCNC: 108 MMOL/L (ref 99–112)
CHOLEST SERPL-MCNC: 150 MG/DL (ref 0–200)
CO2 SERPL-SCNC: 27.7 MMOL/L (ref 24.3–31.9)
CREAT BLD-MCNC: 0.98 MG/DL (ref 0.43–1.29)
GFR SERPL CREATININE-BSD FRML MDRD: 57 ML/MIN/1.73
GLOBULIN UR ELPH-MCNC: 2.6 GM/DL
GLUCOSE BLD-MCNC: 93 MG/DL (ref 70–110)
HDLC SERPL-MCNC: 60 MG/DL (ref 60–100)
LDLC SERPL CALC-MCNC: 71 MG/DL (ref 0–100)
LDLC/HDLC SERPL: 1.19 {RATIO}
OSMOLALITY SERPL CALC.SUM OF ELEC: 281.7 MOSM/KG (ref 273–305)
POTASSIUM BLD-SCNC: 3.9 MMOL/L (ref 3.5–5.3)
PROT SERPL-MCNC: 7.3 G/DL (ref 6–8)
SODIUM BLD-SCNC: 140 MMOL/L (ref 135–153)
TRIGL SERPL-MCNC: 94 MG/DL (ref 0–150)
VLDLC SERPL-MCNC: 18.8 MG/DL

## 2018-08-21 PROCEDURE — 36415 COLL VENOUS BLD VENIPUNCTURE: CPT

## 2018-08-21 PROCEDURE — 80053 COMPREHEN METABOLIC PANEL: CPT | Performed by: PHYSICIAN ASSISTANT

## 2018-08-21 PROCEDURE — 80061 LIPID PANEL: CPT | Performed by: PHYSICIAN ASSISTANT

## 2018-08-27 ENCOUNTER — TELEPHONE (OUTPATIENT)
Dept: FAMILY MEDICINE CLINIC | Facility: CLINIC | Age: 63
End: 2018-08-27

## 2018-08-27 NOTE — TELEPHONE ENCOUNTER
-I called [facility] informed her her triglycerides went form normal to 503. She says she taking medicine as prescribe                      ---- Message from EDD Vargas sent at 8/24/2018 11:16 AM EDT -----  Inform the patient of abnormal labs  #1 her triglycerides (part of her cholesterol) went from normal at 94 in June to 503  #2 is she still taking her fenofibrate and Crestor?  Has she significantly increased carbohydrate consumption in her diet?

## 2018-10-22 ENCOUNTER — CLINICAL SUPPORT (OUTPATIENT)
Dept: FAMILY MEDICINE CLINIC | Facility: CLINIC | Age: 63
End: 2018-10-22

## 2018-10-22 PROCEDURE — 90686 IIV4 VACC NO PRSV 0.5 ML IM: CPT | Performed by: PHYSICIAN ASSISTANT

## 2018-10-22 PROCEDURE — G0008 ADMIN INFLUENZA VIRUS VAC: HCPCS | Performed by: PHYSICIAN ASSISTANT

## 2018-12-07 ENCOUNTER — OFFICE VISIT (OUTPATIENT)
Dept: FAMILY MEDICINE CLINIC | Facility: CLINIC | Age: 63
End: 2018-12-07

## 2018-12-07 VITALS
SYSTOLIC BLOOD PRESSURE: 126 MMHG | HEIGHT: 65 IN | HEART RATE: 58 BPM | BODY MASS INDEX: 30.99 KG/M2 | OXYGEN SATURATION: 97 % | WEIGHT: 186 LBS | DIASTOLIC BLOOD PRESSURE: 74 MMHG | TEMPERATURE: 98.4 F

## 2018-12-07 DIAGNOSIS — I10 BENIGN ESSENTIAL HYPERTENSION: ICD-10-CM

## 2018-12-07 DIAGNOSIS — E28.310 SYMPTOMATIC PREMATURE MENOPAUSE: ICD-10-CM

## 2018-12-07 DIAGNOSIS — N95.9 MENOPAUSAL AND POSTMENOPAUSAL DISORDER: ICD-10-CM

## 2018-12-07 DIAGNOSIS — E03.4 HYPOTHYROIDISM DUE TO ACQUIRED ATROPHY OF THYROID: ICD-10-CM

## 2018-12-07 DIAGNOSIS — F41.1 GENERALIZED ANXIETY DISORDER: ICD-10-CM

## 2018-12-07 DIAGNOSIS — Z13.820 SCREENING FOR OSTEOPOROSIS: ICD-10-CM

## 2018-12-07 DIAGNOSIS — F32.9 MAJOR DEPRESSION, CHRONIC: ICD-10-CM

## 2018-12-07 DIAGNOSIS — F51.01 PRIMARY INSOMNIA: ICD-10-CM

## 2018-12-07 DIAGNOSIS — Z11.59 ENCOUNTER FOR HEPATITIS C SCREENING TEST FOR LOW RISK PATIENT: ICD-10-CM

## 2018-12-07 DIAGNOSIS — E78.2 MIXED HYPERLIPIDEMIA: ICD-10-CM

## 2018-12-07 DIAGNOSIS — K21.9 GASTROESOPHAGEAL REFLUX DISEASE WITHOUT ESOPHAGITIS: ICD-10-CM

## 2018-12-07 DIAGNOSIS — Z00.00 INITIAL MEDICARE ANNUAL WELLNESS VISIT: Primary | ICD-10-CM

## 2018-12-07 PROBLEM — R14.2 BELCHING: Status: RESOLVED | Noted: 2017-08-09 | Resolved: 2018-12-07

## 2018-12-07 PROBLEM — R06.6 HICCUPS: Status: RESOLVED | Noted: 2017-08-09 | Resolved: 2018-12-07

## 2018-12-07 PROBLEM — R14.1 ABDOMINAL GAS PAIN: Status: RESOLVED | Noted: 2017-08-09 | Resolved: 2018-12-07

## 2018-12-07 PROCEDURE — G0402 INITIAL PREVENTIVE EXAM: HCPCS | Performed by: PHYSICIAN ASSISTANT

## 2018-12-07 RX ORDER — TRAZODONE HYDROCHLORIDE 100 MG/1
200 TABLET ORAL NIGHTLY
Qty: 180 TABLET | Refills: 3 | Status: SHIPPED | OUTPATIENT
Start: 2018-12-07 | End: 2019-05-17

## 2018-12-07 RX ORDER — ROSUVASTATIN CALCIUM 20 MG/1
20 TABLET, COATED ORAL NIGHTLY
Qty: 90 TABLET | Refills: 3 | Status: SHIPPED | OUTPATIENT
Start: 2018-12-07 | End: 2019-05-17 | Stop reason: SDUPTHER

## 2018-12-07 RX ORDER — LEVOTHYROXINE SODIUM 0.1 MG/1
100 TABLET ORAL DAILY
Qty: 90 TABLET | Refills: 3 | Status: SHIPPED | OUTPATIENT
Start: 2018-12-07 | End: 2019-05-17 | Stop reason: SDUPTHER

## 2018-12-07 RX ORDER — FENOFIBRATE 145 MG/1
145 TABLET, COATED ORAL DAILY
Qty: 90 TABLET | Refills: 3 | Status: SHIPPED | OUTPATIENT
Start: 2018-12-07 | End: 2019-05-17 | Stop reason: SDUPTHER

## 2018-12-07 RX ORDER — LOSARTAN POTASSIUM 100 MG/1
100 TABLET ORAL DAILY
Qty: 90 TABLET | Refills: 3 | Status: SHIPPED | OUTPATIENT
Start: 2018-12-07 | End: 2019-05-17 | Stop reason: SDUPTHER

## 2018-12-07 NOTE — PATIENT INSTRUCTIONS
"Fat and Cholesterol Restricted Diet  Getting too much fat and cholesterol in your diet may cause health problems. Following this diet helps keep your fat and cholesterol at normal levels. This can keep you from getting sick.  What types of fat should I choose?  · Choose monosaturated and polyunsaturated fats. These are found in foods such as olive oil, canola oil, flaxseeds, walnuts, almonds, and seeds.  · Eat more omega-3 fats. Good choices include salmon, mackerel, sardines, tuna, flaxseed oil, and ground flaxseeds.  · Limit saturated fats. These are in animal products such as meats, butter, and cream. They can also be in plant products such as palm oil, palm kernel oil, and coconut oil.  · Avoid foods with partially hydrogenated oils in them. These contain trans fats. Examples of foods that have trans fats are stick margarine, some tub margarines, cookies, crackers, and other baked goods.  What general guidelines do I need to follow?  · Check food labels. Look for the words \"trans fat\" and \"saturated fat.\"  · When preparing a meal:  ? Fill half of your plate with vegetables and green salads.  ? Fill one fourth of your plate with whole grains. Look for the word \"whole\" as the first word in the ingredient list.  ? Fill one fourth of your plate with lean protein foods.  · Eat more foods that have fiber, like apples, carrots, beans, peas, and barley.  · Eat more home-cooked foods. Eat less at restaurants and buffets.  · Limit or avoid alcohol.  · Limit foods high in starch and sugar.  · Limit fried foods.  · Cook foods without frying them. Baking, boiling, grilling, and broiling are all great options.  · Lose weight if you are overweight. Losing even a small amount of weight can help your overall health. It can also help prevent diseases such as diabetes and heart disease.  What foods can I eat?  Grains  Whole grains, such as whole wheat or whole grain breads, crackers, cereals, and pasta. Unsweetened oatmeal, " bulgur, barley, quinoa, or brown rice. Corn or whole wheat flour tortillas.  Vegetables  Fresh or frozen vegetables (raw, steamed, roasted, or grilled). Green salads.  Fruits  All fresh, canned (in natural juice), or frozen fruits.  Meat and Other Protein Products  Ground beef (85% or leaner), grass-fed beef, or beef trimmed of fat. Skinless chicken or turkey. Ground chicken or turkey. Pork trimmed of fat. All fish and seafood. Eggs. Dried beans, peas, or lentils. Unsalted nuts or seeds. Unsalted canned or dry beans.  Dairy  Low-fat dairy products, such as skim or 1% milk, 2% or reduced-fat cheeses, low-fat ricotta or cottage cheese, or plain low-fat yogurt.  Fats and Oils  Tub margarines without trans fats. Light or reduced-fat mayonnaise and salad dressings. Avocado. Olive, canola, sesame, or safflower oils. Natural peanut or almond butter (choose ones without added sugar and oil).  The items listed above may not be a complete list of recommended foods or beverages. Contact your dietitian for more options.  What foods are not recommended?  Grains  White bread. White pasta. White rice. Cornbread. Bagels, pastries, and croissants. Crackers that contain trans fat.  Vegetables  White potatoes. Corn. Creamed or fried vegetables. Vegetables in a cheese sauce.  Fruits  Dried fruits. Canned fruit in light or heavy syrup. Fruit juice.  Meat and Other Protein Products  Fatty cuts of meat. Ribs, chicken wings, morfin, sausage, bologna, salami, chitterlings, fatback, hot dogs, bratwurst, and packaged luncheon meats. Liver and organ meats.  Dairy  Whole or 2% milk, cream, half-and-half, and cream cheese. Whole milk cheeses. Whole-fat or sweetened yogurt. Full-fat cheeses. Nondairy creamers and whipped toppings. Processed cheese, cheese spreads, or cheese curds.  Sweets and Desserts  Corn syrup, sugars, honey, and molasses. Candy. Jam and jelly. Syrup. Sweetened cereals. Cookies, pies, cakes, donuts, muffins, and ice  cream.  Fats and Oils  Butter, stick margarine, lard, shortening, ghee, or morfin fat. Coconut, palm kernel, or palm oils.  Beverages  Alcohol. Sweetened drinks (such as sodas, lemonade, and fruit drinks or punches).  The items listed above may not be a complete list of foods and beverages to avoid. Contact your dietitian for more information.  This information is not intended to replace advice given to you by your health care provider. Make sure you discuss any questions you have with your health care provider.  Document Released: 06/18/2013 Document Revised: 08/24/2017 Document Reviewed: 03/19/2015  [email or web address] Interactive Patient Education © 2018 [employer]  Advance Directive  Advance directives are legal documents that let you make choices ahead of time about your health care and medical treatment in case you become unable to communicate for yourself. Advance directives are a way for you to communicate your wishes to family, friends, and health care providers. This can help convey your decisions about end-of-life care if you become unable to communicate.  Discussing and writing advance directives should happen over time rather than all at once. Advance directives can be changed depending on your situation and what you want, even after you have signed the advance directives.  If you do not have an advance directive, some states assign family decision makers to act on your behalf based on how closely you are related to them. Each state has its own laws regarding advance directives. You may want to check with your health care provider, , or state representative about the laws in your state. There are different types of advance directives, such as:  · Medical power of .  · Living will.  · Do not resuscitate (DNR) or do not attempt resuscitation (DNAR) order.    Health care proxy and medical power of   A health care proxy, also called a health care agent, is a person who is appointed to make  medical decisions for you in cases in which you are unable to make the decisions yourself. Generally, people choose someone they know well and trust to represent their preferences. Make sure to ask this person for an agreement to act as your proxy. A proxy may have to exercise judgment in the event of a medical decision for which your wishes are not known.  A medical power of  is a legal document that names your health care proxy. Depending on the laws in your state, after the document is written, it may also need to be:  · Signed.  · Notarized.  · Dated.  · Copied.  · Witnessed.  · Incorporated into your medical record.    You may also want to appoint someone to manage your financial affairs in a situation in which you are unable to do so. This is called a durable power of  for finances. It is a separate legal document from the durable power of  for health care. You may choose the same person or someone different from your health care proxy to act as your agent in financial matters.  If you do not appoint a proxy, or if there is a concern that the proxy is not acting in your best interests, a court-appointed guardian may be designated to act on your behalf.  Living will  A living will is a set of instructions documenting your wishes about medical care when you cannot express them yourself. Health care providers should keep a copy of your living will in your medical record. You may want to give a copy to family members or friends. To alert caregivers in case of an emergency, you can place a card in your wallet to let them know that you have a living will and where they can find it. A living will is used if you become:  · Terminally ill.  · Incapacitated.  · Unable to communicate or make decisions.    Items to consider in your living will include:  · The use or non-use of life-sustaining equipment, such as dialysis machines and breathing machines (ventilators).  · A DNR or DNAR order, which is  the instruction not to use cardiopulmonary resuscitation (CPR) if breathing or heartbeat stops.  · The use or non-use of tube feeding.  · Withholding of food and fluids.  · Comfort (palliative) care when the goal becomes comfort rather than a cure.  · Organ and tissue donation.    A living will does not give instructions for distributing your money and property if you should pass away. It is recommended that you seek the advice of a  when writing a will. Decisions about taxes, beneficiaries, and asset distribution will be legally binding. This process can relieve your family and friends of any concerns surrounding disputes or questions that may come up about the distribution of your assets.  DNR or DNAR  A DNR or DNAR order is a request not to have CPR in the event that your heart stops beating or you stop breathing. If a DNR or DNAR order has not been made and shared, a health care provider will try to help any patient whose heart has stopped or who has stopped breathing. If you plan to have surgery, talk with your health care provider about how your DNR or DNAR order will be followed if problems occur.  Summary  · Advance directives are the legal documents that allow you to make choices ahead of time about your health care and medical treatment in case you become unable to communicate for yourself.  · The process of discussing and writing advance directives should happen over time. You can change the advance directives, even after you have signed them.  · Advance directives include DNR or DNAR orders, living gao, and designating an agent as your medical power of .  This information is not intended to replace advice given to you by your health care provider. Make sure you discuss any questions you have with your health care provider.  Document Released: 03/26/2009 Document Revised: 11/06/2017 Document Reviewed: 11/06/2017  [employer] Interactive Patient Education © 2017 [employer]    Medicare  Wellness  Personal Prevention Plan of Service     Date of Office Visit:  2018  Encounter Provider:  EDD Vargas  Place of Service:  [facility]  Patient Name: June Melchor  :  1955    As part of the Medicare Wellness portion of your visit today, we are providing you with this personalized preventive plan of services (PPPS). This plan is based upon recommendations of the [place] Preventive Services Task Force (USPSTF) and the Advisory Committee on Immunization Practices (ACIP).    This lists the preventive care services that should be considered, and provides dates of when you are due. Items listed as completed are up-to-date and do not require any further intervention.    Health Maintenance   Topic Date Due   • HEPATITIS A VACCINE ADULT (1 of 2) 09/10/1971   • ZOSTER VACCINE (2 of 2) 2015   • PNEUMOCOCCAL VACCINES (65+ LOW/MEDIUM RISK) (1 of 2 - PCV13) 09/10/2018   • PAP SMEAR  10/15/2018   • MAMMOGRAM  2019   • LIPID PANEL  2019   • MEDICARE ANNUAL WELLNESS  2019   • DXA SCAN  2020   • COLONOSCOPY  2022   • TDAP/TD VACCINES (2 - Td) 2027   • HEPATITIS C SCREENING  Completed   • INFLUENZA VACCINE  Completed       Orders Placed This Encounter   Procedures   • DEXA Bone Density Axial     Standing Status:   Future     Standing Expiration Date:   2019     Scheduling Instructions:      Robert shaver     Order Specific Question:   Reason for Exam:     Answer:   screen for osteoporosis   • Hepatitis C antibody     Standing Status:   Future     Standing Expiration Date:   2019       No Follow-up on file.

## 2018-12-10 ENCOUNTER — TELEPHONE (OUTPATIENT)
Dept: FAMILY MEDICINE CLINIC | Facility: CLINIC | Age: 63
End: 2018-12-10

## 2018-12-10 NOTE — TELEPHONE ENCOUNTER
---put flu 10/2017 provar 11/21/2018      -- Message from EDD Vargas sent at 12/7/2018  1:10 PM EST -----   Call [facility] and get her shot records so we can document them

## 2018-12-23 PROCEDURE — 90670 PCV13 VACCINE IM: CPT | Performed by: PHYSICIAN ASSISTANT

## 2018-12-23 PROCEDURE — G0009 ADMIN PNEUMOCOCCAL VACCINE: HCPCS | Performed by: PHYSICIAN ASSISTANT

## 2019-01-16 ENCOUNTER — HOSPITAL ENCOUNTER (OUTPATIENT)
Dept: BONE DENSITY | Facility: HOSPITAL | Age: 64
Discharge: HOME | End: 2019-01-16
Admitting: PHYSICIAN ASSISTANT

## 2019-01-16 DIAGNOSIS — E28.310 SYMPTOMATIC PREMATURE MENOPAUSE: ICD-10-CM

## 2019-01-16 DIAGNOSIS — Z13.820 SCREENING FOR OSTEOPOROSIS: ICD-10-CM

## 2019-01-16 PROCEDURE — 77080 DXA BONE DENSITY AXIAL: CPT

## 2019-01-16 PROCEDURE — 77080 DXA BONE DENSITY AXIAL: CPT | Performed by: RADIOLOGY

## 2019-01-18 ENCOUNTER — TELEPHONE (OUTPATIENT)
Dept: FAMILY MEDICINE CLINIC | Facility: CLINIC | Age: 64
End: 2019-01-18

## 2019-01-18 NOTE — TELEPHONE ENCOUNTER
I called [facility] informed her bone density screening was normal          ----- Message from EDD Vargas sent at 1/18/2019  3:56 PM EST -----  Inform the patient that her bone density screening was normal

## 2019-05-10 ENCOUNTER — HOSPITAL ENCOUNTER (OUTPATIENT)
Dept: MAMMOGRAPHY | Facility: HOSPITAL | Age: 64
Discharge: HOME | End: 2019-05-10
Admitting: PHYSICIAN ASSISTANT

## 2019-05-10 DIAGNOSIS — Z12.39 SCREENING BREAST EXAMINATION: ICD-10-CM

## 2019-05-10 PROCEDURE — 77063 BREAST TOMOSYNTHESIS BI: CPT

## 2019-05-10 PROCEDURE — 77067 SCR MAMMO BI INCL CAD: CPT | Performed by: RADIOLOGY

## 2019-05-10 PROCEDURE — 77063 BREAST TOMOSYNTHESIS BI: CPT | Performed by: RADIOLOGY

## 2019-05-10 PROCEDURE — 77067 SCR MAMMO BI INCL CAD: CPT

## 2019-05-14 ENCOUNTER — TELEPHONE (OUTPATIENT)
Dept: FAMILY MEDICINE CLINIC | Facility: CLINIC | Age: 64
End: 2019-05-14

## 2019-05-14 NOTE — TELEPHONE ENCOUNTER
I called [facility] informed mammogram was normal        ----- Message from EDD Vargas sent at 5/13/2019  4:23 PM EDT -----  Inform patient her mammogram was benign

## 2019-05-17 ENCOUNTER — OFFICE VISIT (OUTPATIENT)
Dept: FAMILY MEDICINE CLINIC | Facility: CLINIC | Age: 64
End: 2019-05-17

## 2019-05-17 VITALS
HEIGHT: 65 IN | OXYGEN SATURATION: 96 % | WEIGHT: 185 LBS | SYSTOLIC BLOOD PRESSURE: 130 MMHG | DIASTOLIC BLOOD PRESSURE: 80 MMHG | HEART RATE: 86 BPM | TEMPERATURE: 98.9 F | BODY MASS INDEX: 30.82 KG/M2

## 2019-05-17 DIAGNOSIS — E03.4 HYPOTHYROIDISM DUE TO ACQUIRED ATROPHY OF THYROID: ICD-10-CM

## 2019-05-17 DIAGNOSIS — I10 BENIGN ESSENTIAL HYPERTENSION: ICD-10-CM

## 2019-05-17 DIAGNOSIS — R73.9 HYPERGLYCEMIA: ICD-10-CM

## 2019-05-17 DIAGNOSIS — R30.0 DYSURIA: Primary | ICD-10-CM

## 2019-05-17 DIAGNOSIS — F51.01 PRIMARY INSOMNIA: ICD-10-CM

## 2019-05-17 DIAGNOSIS — E78.2 MIXED HYPERLIPIDEMIA: ICD-10-CM

## 2019-05-17 DIAGNOSIS — R55 SYNCOPE, UNSPECIFIED SYNCOPE TYPE: ICD-10-CM

## 2019-05-17 LAB
BILIRUB BLD-MCNC: NEGATIVE MG/DL
CLARITY, POC: CLEAR
COLOR UR: NORMAL
GLUCOSE UR STRIP-MCNC: NEGATIVE MG/DL
KETONES UR QL: NEGATIVE
LEUKOCYTE EST, POC: NEGATIVE
NITRITE UR-MCNC: NEGATIVE MG/ML
PH UR: 7 [PH] (ref 5–8)
PROT UR STRIP-MCNC: NEGATIVE MG/DL
RBC # UR STRIP: NEGATIVE /UL
SP GR UR: 1.01 (ref 1–1.03)
UROBILINOGEN UR QL: NORMAL

## 2019-05-17 PROCEDURE — 99214 OFFICE O/P EST MOD 30 MIN: CPT | Performed by: PHYSICIAN ASSISTANT

## 2019-05-17 PROCEDURE — 81003 URINALYSIS AUTO W/O SCOPE: CPT | Performed by: PHYSICIAN ASSISTANT

## 2019-05-17 RX ORDER — HYDROXYZINE 50 MG/1
100 TABLET, FILM COATED ORAL NIGHTLY PRN
Qty: 180 TABLET | Refills: 3 | Status: SHIPPED | OUTPATIENT
Start: 2019-05-17 | End: 2019-06-07

## 2019-05-17 RX ORDER — HYDROXYZINE 50 MG/1
100 TABLET, FILM COATED ORAL NIGHTLY PRN
Qty: 60 TABLET | Refills: 5 | Status: SHIPPED | OUTPATIENT
Start: 2019-05-17 | End: 2019-05-17 | Stop reason: SDUPTHER

## 2019-05-17 RX ORDER — LEVOTHYROXINE SODIUM 0.1 MG/1
100 TABLET ORAL DAILY
Qty: 90 TABLET | Refills: 3 | Status: SHIPPED | OUTPATIENT
Start: 2019-05-17 | End: 2019-07-16 | Stop reason: SDUPTHER

## 2019-05-17 RX ORDER — ROSUVASTATIN CALCIUM 20 MG/1
20 TABLET, COATED ORAL NIGHTLY
Qty: 90 TABLET | Refills: 3 | Status: SHIPPED | OUTPATIENT
Start: 2019-05-17 | End: 2019-07-16 | Stop reason: SDUPTHER

## 2019-05-17 RX ORDER — LOSARTAN POTASSIUM 100 MG/1
100 TABLET ORAL DAILY
Qty: 90 TABLET | Refills: 3 | Status: SHIPPED | OUTPATIENT
Start: 2019-05-17 | End: 2019-07-16 | Stop reason: SDUPTHER

## 2019-05-17 RX ORDER — FENOFIBRATE 145 MG/1
145 TABLET, COATED ORAL DAILY
Qty: 90 TABLET | Refills: 3 | Status: SHIPPED | OUTPATIENT
Start: 2019-05-17 | End: 2019-07-16 | Stop reason: SDUPTHER

## 2019-05-17 NOTE — PROGRESS NOTES
Subjective   [facility] Jill is a 63 y.o. female.       Chief Complaint -dysuria    History of Present Illness -      Dysuria-  She went to [facility] emergency room on 4/27/2019 and was treated for dysuria due to cystitis.  Symptoms have significantly improved with use of Macrobid and AZO.    Insomnia-  She reports this is not controlled.  She states that she is unable to swallow the trazodone pill due to its large size.    Hyperglycemia-  She reports that her blood sugar was elevated when her brother checked her blood sugar randomly but does not give a specific reading.  She is concerned that she may have prediabetes.    Syncope-  She reports another episode of syncope.  She states that approximately 10 days ago she got up in the middle of the night to urinate in the restroom.  She states that she remembers sitting on the toilet and then awoken with her left eye and knee swollen and bruised.  She states that she apparently had time to pull her pants up prior to syncope.  She lives alone.  She has been worked up by cardiology and a cardiac reason for syncope was not found.    Hyperlipidemia-stable with fenofibrate and Crestor  Lab Results   Component Value Date    CHOL 150 08/21/2018    TRIG 94 08/21/2018    HDL 60 08/21/2018    LDL 71 08/21/2018     Hypothyroidism-stable with 100 mcg of Synthroid daily.  Lab Results   Component Value Date    TSH 0.480 (L) 06/22/2018         Hypertension- stable with losartan    The following portions of the patient's history were reviewed and updated as appropriate: allergies, current medications, past family history, past medical history, past social history, past surgical history and problem list.    Review of Systems   Constitutional: Negative for activity change, appetite change and fever.   HENT: Negative for ear pain, sinus pressure and sore throat.    Eyes: Negative for pain and visual disturbance.   Respiratory: Negative for cough and chest tightness.   "  Cardiovascular: Negative for chest pain and palpitations.   Gastrointestinal: Negative for abdominal pain, constipation, diarrhea, nausea and vomiting.   Endocrine: Negative for polydipsia and polyuria.   Genitourinary: Positive for dysuria. Negative for frequency.   Musculoskeletal: Negative for back pain and myalgias.   Skin: Negative for color change and rash.   Allergic/Immunologic: Negative for food allergies and immunocompromised state.   Neurological: Positive for syncope. Negative for dizziness and headaches.   Hematological: Negative for adenopathy. Does not bruise/bleed easily.   Psychiatric/Behavioral: Positive for sleep disturbance. Negative for hallucinations and suicidal ideas. The patient is not nervous/anxious.        /80   Pulse 86   Temp 98.9 °F (37.2 °C) (Oral)   Ht 165.1 cm (65\")   Wt 83.9 kg (185 lb)   SpO2 96%   BMI 30.79 kg/m²   Lab on 08/21/2018   Component Date Value Ref Range Status   • Glucose 08/21/2018 93  70 - 110 mg/dL Final   • BUN 08/21/2018 20  7 - 21 mg/dL Final   • Creatinine 08/21/2018 0.98  0.43 - 1.29 mg/dL Final   • Sodium 08/21/2018 140  135 - 153 mmol/L Final   • Potassium 08/21/2018 3.9  3.5 - 5.3 mmol/L Final   • Chloride 08/21/2018 108  99 - 112 mmol/L Final   • CO2 08/21/2018 27.7  24.3 - 31.9 mmol/L Final   • Calcium 08/21/2018 9.6  7.7 - 10.0 mg/dL Final   • Total Protein 08/21/2018 7.3  6.0 - 8.0 g/dL Final   • Albumin 08/21/2018 4.70  3.40 - 4.80 g/dL Final   • ALT (SGPT) 08/21/2018 30  10 - 36 U/L Final   • AST (SGOT) 08/21/2018 32* 10 - 30 U/L Final   • Alkaline Phosphatase 08/21/2018 68  35 - 104 U/L Final   • Total Bilirubin 08/21/2018 0.6  0.2 - 1.8 mg/dL Final   • eGFR Non African Amer 08/21/2018 57* >60 mL/min/1.73 Final   • Globulin 08/21/2018 2.6  gm/dL Final   • A/G Ratio 08/21/2018 1.8  1.5 - 2.5 g/dL Final   • BUN/Creatinine Ratio 08/21/2018 20.4  7.0 - 25.0 Final   • Anion Gap 08/21/2018 4.3  3.6 - 11.2 mmol/L Final   • Total Cholesterol " 08/21/2018 150  0 - 200 mg/dL Final   • Triglycerides 08/21/2018 94  0 - 150 mg/dL Final   • HDL Cholesterol 08/21/2018 60  60 - 100 mg/dL Final   • LDL Cholesterol  08/21/2018 71  0 - 100 mg/dL Final   • VLDL Cholesterol 08/21/2018 18.8  mg/dL Final   • LDL/HDL Ratio 08/21/2018 1.19   Final   • Osmolality Calc 08/21/2018 281.7  273.0 - 305.0 mOsm/kg Final       Physical Exam   Constitutional: She is oriented to person, place, and time. She appears well-developed and well-nourished.   HENT:   Head: Normocephalic and atraumatic.   Nose: Nose normal.   Mouth/Throat: Oropharynx is clear and moist.   Eyes: Conjunctivae and EOM are normal. Pupils are equal, round, and reactive to light.   Neck: Normal range of motion. Neck supple. No tracheal deviation present. No thyromegaly present.   Cardiovascular: Normal rate, regular rhythm, normal heart sounds and intact distal pulses.   No murmur heard.  Pulmonary/Chest: Effort normal and breath sounds normal. No respiratory distress. She has no wheezes.   Abdominal: Soft. Bowel sounds are normal. There is no tenderness. There is no guarding.   Musculoskeletal: Normal range of motion. She exhibits no edema or tenderness.   Lymphadenopathy:     She has no cervical adenopathy.   Neurological: She is alert and oriented to person, place, and time.   Skin: Skin is warm and dry. No rash noted.   Dark brown contusions noted left upper eyelid and medial knee   Psychiatric: She has a normal mood and affect. Her behavior is normal.   Nursing note and vitals reviewed.      Assessment/Plan     Diagnoses and all orders for this visit:    Dysuria  -     POC Urinalysis Dipstick, Automated    Primary insomnia  Comments:  Discontinue trazodone due to size  Start hydroxyzine  Orders:  -     Discontinue: hydrOXYzine (ATARAX) 50 MG tablet; Take 2 tablets by mouth At Night As Needed for Itching.  -     hydrOXYzine (ATARAX) 50 MG tablet; Take 2 tablets by mouth At Night As Needed for  Itching.    Hyperglycemia  -     Hemoglobin A1c; Future    Syncope, unspecified syncope type  -     Ambulatory Referral to Neurology    Mixed hyperlipidemia  -     fenofibrate (TRICOR) 145 MG tablet; Take 1 tablet by mouth Daily.  -     rosuvastatin (CRESTOR) 20 MG tablet; Take 1 tablet by mouth Every Night.    Hypothyroidism due to acquired atrophy of thyroid  -     levothyroxine (SYNTHROID, LEVOTHROID) 100 MCG tablet; Take 1 tablet by mouth Daily.    Benign essential hypertension  -     losartan (COZAAR) 100 MG tablet; Take 1 tablet by mouth Daily.                 This document has been electronically signed by:  Paulette Wallis PA-C

## 2019-05-31 ENCOUNTER — LAB (OUTPATIENT)
Dept: FAMILY MEDICINE CLINIC | Facility: CLINIC | Age: 64
End: 2019-05-31

## 2019-05-31 DIAGNOSIS — E03.4 HYPOTHYROIDISM DUE TO ACQUIRED ATROPHY OF THYROID: ICD-10-CM

## 2019-05-31 DIAGNOSIS — E78.2 MIXED HYPERLIPIDEMIA: ICD-10-CM

## 2019-05-31 DIAGNOSIS — I10 BENIGN ESSENTIAL HYPERTENSION: ICD-10-CM

## 2019-05-31 DIAGNOSIS — R73.9 HYPERGLYCEMIA: ICD-10-CM

## 2019-05-31 DIAGNOSIS — Z11.59 ENCOUNTER FOR HEPATITIS C SCREENING TEST FOR LOW RISK PATIENT: ICD-10-CM

## 2019-05-31 LAB
ALBUMIN SERPL-MCNC: 4.4 G/DL (ref 3.5–5.2)
ALBUMIN/GLOB SERPL: 1.6 G/DL
ALP SERPL-CCNC: 67 U/L (ref 39–117)
ALT SERPL W P-5'-P-CCNC: 19 U/L (ref 1–33)
ANION GAP SERPL CALCULATED.3IONS-SCNC: 11.2 MMOL/L
AST SERPL-CCNC: 23 U/L (ref 1–32)
BILIRUB SERPL-MCNC: 0.4 MG/DL (ref 0.2–1.2)
BUN BLD-MCNC: 18 MG/DL (ref 8–23)
BUN/CREAT SERPL: 16.4 (ref 7–25)
CALCIUM SPEC-SCNC: 9.6 MG/DL (ref 8.6–10.5)
CHLORIDE SERPL-SCNC: 106 MMOL/L (ref 98–107)
CHOLEST SERPL-MCNC: 171 MG/DL (ref 0–200)
CO2 SERPL-SCNC: 26.8 MMOL/L (ref 22–29)
CREAT BLD-MCNC: 1.1 MG/DL (ref 0.57–1)
GFR SERPL CREATININE-BSD FRML MDRD: 50 ML/MIN/1.73
GLOBULIN UR ELPH-MCNC: 2.8 GM/DL
GLUCOSE BLD-MCNC: 87 MG/DL (ref 65–99)
HBA1C MFR BLD: 5.2 % (ref 4.8–5.6)
HCV AB SER DONR QL: NORMAL
HDLC SERPL-MCNC: 71 MG/DL (ref 40–60)
LDLC SERPL CALC-MCNC: 87 MG/DL (ref 0–100)
LDLC/HDLC SERPL: 1.23 {RATIO}
POTASSIUM BLD-SCNC: 4 MMOL/L (ref 3.5–5.2)
PROT SERPL-MCNC: 7.2 G/DL (ref 6–8.5)
SODIUM BLD-SCNC: 144 MMOL/L (ref 136–145)
T4 FREE SERPL-MCNC: 1.47 NG/DL (ref 0.93–1.7)
TRIGL SERPL-MCNC: 63 MG/DL (ref 0–150)
TSH SERPL DL<=0.05 MIU/L-ACNC: 3.67 MIU/ML (ref 0.27–4.2)
VLDLC SERPL-MCNC: 12.6 MG/DL (ref 5–40)

## 2019-05-31 PROCEDURE — 84439 ASSAY OF FREE THYROXINE: CPT | Performed by: PHYSICIAN ASSISTANT

## 2019-05-31 PROCEDURE — 80061 LIPID PANEL: CPT | Performed by: PHYSICIAN ASSISTANT

## 2019-05-31 PROCEDURE — 86803 HEPATITIS C AB TEST: CPT | Performed by: PHYSICIAN ASSISTANT

## 2019-05-31 PROCEDURE — 84443 ASSAY THYROID STIM HORMONE: CPT | Performed by: PHYSICIAN ASSISTANT

## 2019-05-31 PROCEDURE — 83036 HEMOGLOBIN GLYCOSYLATED A1C: CPT | Performed by: PHYSICIAN ASSISTANT

## 2019-05-31 PROCEDURE — 80053 COMPREHEN METABOLIC PANEL: CPT | Performed by: PHYSICIAN ASSISTANT

## 2019-06-07 ENCOUNTER — OFFICE VISIT (OUTPATIENT)
Dept: FAMILY MEDICINE CLINIC | Facility: CLINIC | Age: 64
End: 2019-06-07

## 2019-06-07 VITALS
HEIGHT: 65 IN | DIASTOLIC BLOOD PRESSURE: 76 MMHG | BODY MASS INDEX: 30.82 KG/M2 | WEIGHT: 185 LBS | OXYGEN SATURATION: 95 % | TEMPERATURE: 98.6 F | SYSTOLIC BLOOD PRESSURE: 120 MMHG | HEART RATE: 69 BPM

## 2019-06-07 DIAGNOSIS — I10 BENIGN ESSENTIAL HYPERTENSION: ICD-10-CM

## 2019-06-07 DIAGNOSIS — F51.01 PRIMARY INSOMNIA: ICD-10-CM

## 2019-06-07 DIAGNOSIS — E78.2 MIXED HYPERLIPIDEMIA: Primary | ICD-10-CM

## 2019-06-07 DIAGNOSIS — E03.4 HYPOTHYROIDISM DUE TO ACQUIRED ATROPHY OF THYROID: ICD-10-CM

## 2019-06-07 PROCEDURE — 99214 OFFICE O/P EST MOD 30 MIN: CPT | Performed by: PHYSICIAN ASSISTANT

## 2019-06-07 RX ORDER — HYDROXYZINE PAMOATE 50 MG/1
50 CAPSULE ORAL 3 TIMES DAILY PRN
Qty: 270 CAPSULE | Refills: 3 | Status: SHIPPED | OUTPATIENT
Start: 2019-06-07 | End: 2019-07-16 | Stop reason: SDUPTHER

## 2019-06-07 NOTE — PROGRESS NOTES
Subjective   [facility] Jill is a 63 y.o. female.       Chief Complaint -insomnia    History of Present Illness -      Insomnia-  She had previously failed trazodone due to inability to follow the large tablet.  She states she has been doing well with hydroxyzine.  She is takes 100 mg at night and sometimes takes one during the day for anxiety or nervousness.    Hyperlipidemia-controlled with TriCor Crestor and low-cholesterol diet  Lab Results   Component Value Date    CHOL 171 05/31/2019    TRIG 63 05/31/2019    HDL 71 (H) 05/31/2019    LDL 87 05/31/2019     Hyperlipidemia-stable with losartan.  She reports home blood pressure less than 130/80 consistently.    Hypothyroidism-stable with Synthroid 100 mcg daily  Lab Results   Component Value Date    TSH 3.670 05/31/2019         The following portions of the patient's history were reviewed and updated as appropriate: allergies, current medications, past family history, past medical history, past social history, past surgical history and problem list.    Review of Systems   Constitutional: Negative for activity change, appetite change and fever.   HENT: Negative for ear pain, sinus pressure and sore throat.    Eyes: Negative for pain and visual disturbance.   Respiratory: Negative for cough and chest tightness.    Cardiovascular: Negative for chest pain and palpitations.   Gastrointestinal: Negative for abdominal pain, constipation, diarrhea, nausea and vomiting.   Endocrine: Negative for polydipsia and polyuria.   Genitourinary: Negative for dysuria and frequency.   Musculoskeletal: Negative for back pain and myalgias.   Skin: Negative for color change and rash.   Allergic/Immunologic: Negative for food allergies and immunocompromised state.   Neurological: Negative for dizziness, syncope and headaches.   Hematological: Negative for adenopathy. Does not bruise/bleed easily.   Psychiatric/Behavioral: Positive for sleep disturbance. Negative for dysphoric mood,  "hallucinations, self-injury and suicidal ideas. The patient is not nervous/anxious.        /76   Pulse 69   Temp 98.6 °F (37 °C) (Oral)   Ht 165.1 cm (65\")   Wt 83.9 kg (185 lb)   SpO2 95%   BMI 30.79 kg/m²   Lab on 05/31/2019   Component Date Value Ref Range Status   • Hepatitis C Ab 05/31/2019 Non-Reactive  Non-Reactive Final   • TSH 05/31/2019 3.670  0.270 - 4.200 mIU/mL Final   • Glucose 05/31/2019 87  65 - 99 mg/dL Final   • BUN 05/31/2019 18  8 - 23 mg/dL Final   • Creatinine 05/31/2019 1.10* 0.57 - 1.00 mg/dL Final   • Sodium 05/31/2019 144  136 - 145 mmol/L Final   • Potassium 05/31/2019 4.0  3.5 - 5.2 mmol/L Final   • Chloride 05/31/2019 106  98 - 107 mmol/L Final   • CO2 05/31/2019 26.8  22.0 - 29.0 mmol/L Final   • Calcium 05/31/2019 9.6  8.6 - 10.5 mg/dL Final   • Total Protein 05/31/2019 7.2  6.0 - 8.5 g/dL Final   • Albumin 05/31/2019 4.40  3.50 - 5.20 g/dL Final   • ALT (SGPT) 05/31/2019 19  1 - 33 U/L Final   • AST (SGOT) 05/31/2019 23  1 - 32 U/L Final   • Alkaline Phosphatase 05/31/2019 67  39 - 117 U/L Final   • Total Bilirubin 05/31/2019 0.4  0.2 - 1.2 mg/dL Final   • eGFR Non African Amer 05/31/2019 50* >60 mL/min/1.73 Final   • Globulin 05/31/2019 2.8  gm/dL Final   • A/G Ratio 05/31/2019 1.6  g/dL Final   • BUN/Creatinine Ratio 05/31/2019 16.4  7.0 - 25.0 Final   • Anion Gap 05/31/2019 11.2  mmol/L Final   • Total Cholesterol 05/31/2019 171  0 - 200 mg/dL Final   • Triglycerides 05/31/2019 63  0 - 150 mg/dL Final   • HDL Cholesterol 05/31/2019 71* 40 - 60 mg/dL Final   • LDL Cholesterol  05/31/2019 87  0 - 100 mg/dL Final   • VLDL Cholesterol 05/31/2019 12.6  5 - 40 mg/dL Final   • LDL/HDL Ratio 05/31/2019 1.23   Final   • Free T4 05/31/2019 1.47  0.93 - 1.70 ng/dL Final   • Hemoglobin A1C 05/31/2019 5.20  4.80 - 5.60 % Final       Physical Exam   Constitutional: She is oriented to person, place, and time. She appears well-developed and well-nourished.   HENT:   Head: Normocephalic " and atraumatic.   Nose: Nose normal.   Mouth/Throat: Oropharynx is clear and moist.   Eyes: Conjunctivae and EOM are normal. Pupils are equal, round, and reactive to light.   Neck: Normal range of motion. Neck supple. No tracheal deviation present. No thyromegaly present.   Cardiovascular: Normal rate, regular rhythm, normal heart sounds and intact distal pulses.   No murmur heard.  Pulmonary/Chest: Effort normal and breath sounds normal. No respiratory distress. She has no wheezes.   Abdominal: Soft. Bowel sounds are normal. There is no tenderness. There is no guarding.   Musculoskeletal: Normal range of motion. She exhibits no edema or tenderness.   Lymphadenopathy:     She has no cervical adenopathy.   Neurological: She is alert and oriented to person, place, and time.   Skin: Skin is warm and dry. No rash noted.   Psychiatric: She has a normal mood and affect. Her behavior is normal.   Nursing note and vitals reviewed.      Assessment/Plan     Diagnoses and all orders for this visit:    Mixed hyperlipidemia  Comments:  Continue TriCor, Crestor, and low-cholesterol diet    Primary insomnia  Comments:  Discontinue trazodone due to size  Continue hydroxyzine  Orders:  -     hydrOXYzine pamoate (VISTARIL) 50 MG capsule; Take 1 capsule by mouth 3 (Three) Times a Day As Needed for Anxiety.    Benign essential hypertension  Comments:  Continue losartan    Hypothyroidism due to acquired atrophy of thyroid  Comments:  Continue Synthroid 100 mcg daily                 This document has been electronically signed by:  Paulette Wallis PA-C

## 2019-06-24 ENCOUNTER — TELEPHONE (OUTPATIENT)
Dept: FAMILY MEDICINE CLINIC | Facility: CLINIC | Age: 64
End: 2019-06-24

## 2019-06-24 RX ORDER — BUSPIRONE HYDROCHLORIDE 10 MG/1
10 TABLET ORAL 3 TIMES DAILY
Qty: 90 TABLET | Refills: 2 | Status: SHIPPED | OUTPATIENT
Start: 2019-06-24 | End: 2019-06-28 | Stop reason: ALTCHOICE

## 2019-06-24 NOTE — TELEPHONE ENCOUNTER
----- Message from EDD Vargas sent at 6/23/2019  4:39 PM EDT -----  Will you call her and see what she needs PA'ed please?  Vistaril can be changed to buspar 10mg tid prn #60 with 2 refills  Thank you    ----- Message -----  From: Liset Garza RegSched Rep  Sent: 6/21/2019   8:15 AM  To: EDD Vargas, Amanda Navarro MA, #    Need to change pharmacy to [facility] in [place]    Also needing PA on several meds

## 2019-06-28 ENCOUNTER — OFFICE VISIT (OUTPATIENT)
Dept: FAMILY MEDICINE CLINIC | Facility: CLINIC | Age: 64
End: 2019-06-28

## 2019-06-28 VITALS
HEART RATE: 74 BPM | TEMPERATURE: 99 F | HEIGHT: 65 IN | DIASTOLIC BLOOD PRESSURE: 68 MMHG | BODY MASS INDEX: 30.86 KG/M2 | RESPIRATION RATE: 14 BRPM | SYSTOLIC BLOOD PRESSURE: 130 MMHG | OXYGEN SATURATION: 98 % | WEIGHT: 185.2 LBS

## 2019-06-28 DIAGNOSIS — R21 RASH: Primary | ICD-10-CM

## 2019-06-28 DIAGNOSIS — F41.1 GENERALIZED ANXIETY DISORDER: ICD-10-CM

## 2019-06-28 PROCEDURE — 99213 OFFICE O/P EST LOW 20 MIN: CPT | Performed by: NURSE PRACTITIONER

## 2019-06-28 RX ORDER — NYSTATIN 100000 U/G
CREAM TOPICAL 2 TIMES DAILY PRN
Qty: 30 G | Refills: 1 | Status: SHIPPED | OUTPATIENT
Start: 2019-06-28 | End: 2019-08-26

## 2019-06-28 RX ORDER — NYSTATIN 100000 [USP'U]/G
POWDER TOPICAL 3 TIMES DAILY
Qty: 60 G | Refills: 1 | Status: SHIPPED | OUTPATIENT
Start: 2019-06-28 | End: 2019-08-16 | Stop reason: SDUPTHER

## 2019-06-28 RX ORDER — ESCITALOPRAM OXALATE 10 MG/1
10 TABLET ORAL DAILY
Qty: 30 TABLET | Refills: 0 | Status: SHIPPED | OUTPATIENT
Start: 2019-06-28 | End: 2019-07-16

## 2019-06-28 NOTE — PATIENT INSTRUCTIONS
Rash  A rash is a change in the color of the skin. A rash can also change the way your skin feels. There are many different conditions and factors that can cause a rash.  Follow these instructions at home:  Pay attention to any changes in your symptoms. Follow these instructions to help with your condition:  Medicine  Take or apply over-the-counter and prescription medicines only as told by your doctor. These may include:  · Corticosteroid cream.  · Anti-itch lotions.  · Oral antihistamines.    Skin Care  · Put cool compresses on the affected areas.  · Try taking a bath with:  ? Epsom salts. Follow the instructions on the packaging. You can get these at your local pharmacy or grocery store.  ? Baking soda. Pour a small amount into the bath as told by your doctor.  ? Colloidal oatmeal. Follow the instructions on the packaging. You can get this at your local pharmacy or grocery store.  · Try putting baking soda paste onto your skin. Stir water into baking soda until it gets like a paste.  · Do not scratch or rub your skin.  · Avoid covering the rash. Make sure the rash is exposed to air as much as possible.  General instructions  · Avoid hot showers or baths, which can make itching worse. A cold shower may help.  · Avoid scented soaps, detergents, and perfumes. Use gentle soaps, detergents, perfumes, and other cosmetic products.  · Avoid anything that causes your rash. Keep a journal to help track what causes your rash. Write down:  ? What you eat.  ? What cosmetic products you use.  ? What you drink.  ? What you wear. This includes jewelry.  · Keep all follow-up visits as told by your doctor. This is important.  Contact a doctor if:  · You sweat at night.  · You lose weight.  · You pee (urinate) more than normal.  · You feel weak.  · You throw up (vomit).  · Your skin or the whites of your eyes look yellow (jaundice).  · Your skin:  ? Tingles.  ? Is numb.  · Your rash:  ? Does not go away after a few days.  ? Gets  worse.  · You are:  ? More thirsty than normal.  ? More tired than normal.  · You have:  ? New symptoms.  ? Pain in your belly (abdomen).  ? A fever.  ? Watery poop (diarrhea).  Get help right away if:  · Your rash covers all or most of your body. The rash may or may not be painful.  · You have blisters that:  ? Are on top of the rash.  ? Grow larger.  ? Grow together.  ? Are painful.  ? Are inside your nose or mouth.  · You have a rash that:  ? Looks like purple pinprick-sized spots all over your body.  ? Has a “bull's eye” or looks like a target.  ? Is red and painful, causes your skin to peel, and is not from being in the sun too long.  This information is not intended to replace advice given to you by your health care provider. Make sure you discuss any questions you have with your health care provider.  Document Released: 06/05/2009 Document Revised: 05/25/2017 Document Reviewed: 05/04/2016  [employer] Interactive Patient Education © 2019 [employer]    Living With Anxiety  After being diagnosed with an anxiety disorder, you may be relieved to know why you have felt or behaved a certain way. It is natural to also feel overwhelmed about the treatment ahead and what it will mean for your life. With care and support, you can manage this condition and recover from it.  How to cope with anxiety  Dealing with stress  Stress is your body’s reaction to life changes and events, both good and bad. Stress can last just a few hours or it can be ongoing. Stress can play a major role in anxiety, so it is important to learn both how to cope with stress and how to think about it differently.  Talk with your health care provider or a counselor to learn more about stress reduction. He or she may suggest some stress reduction techniques, such as:  · Music therapy. This can include creating or listening to music that you enjoy and that inspires you.  · Mindfulness-based meditation. This involves being aware of your normal breaths,  rather than trying to control your breathing. It can be done while sitting or walking.  · Centering prayer. This is a kind of meditation that involves focusing on a word, phrase, or sacred image that is meaningful to you and that brings you peace.  · Deep breathing. To do this, expand your stomach and inhale slowly through your nose. Hold your breath for 3-5 seconds. Then exhale slowly, allowing your stomach muscles to relax.  · Self-talk. This is a skill where you identify thought patterns that lead to anxiety reactions and correct those thoughts.  · Muscle relaxation. This involves tensing muscles then relaxing them.    Choose a stress reduction technique that fits your lifestyle and personality. Stress reduction techniques take time and practice. Set aside 5-15 minutes a day to do them. Therapists can offer training in these techniques. The training may be covered by some insurance plans. Other things you can do to manage stress include:  · Keeping a stress diary. This can help you learn what triggers your stress and ways to control your response.  · Thinking about how you respond to certain situations. You may not be able to control everything, but you can control your reaction.  · Making time for activities that help you relax, and not feeling guilty about spending your time in this way.    Therapy combined with coping and stress-reduction skills provides the best chance for successful treatment.  Medicines  Medicines can help ease symptoms. Medicines for anxiety include:  · Anti-anxiety drugs.  · Antidepressants.  · Beta-blockers.    Medicines may be used as the main treatment for anxiety disorder, along with therapy, or if other treatments are not working. Medicines should be prescribed by a health care provider.  Relationships  Relationships can play a big part in helping you recover. Try to spend more time connecting with trusted friends and family members. Consider going to couples counseling, taking  family education classes, or going to family therapy. Therapy can help you and others better understand the condition.  How to recognize changes in your condition  Everyone has a different response to treatment for anxiety. Recovery from anxiety happens when symptoms decrease and stop interfering with your daily activities at home or work. This may mean that you will start to:  · Have better concentration and focus.  · Sleep better.  · Be less irritable.  · Have more energy.  · Have improved memory.    It is important to recognize when your condition is getting worse. Contact your health care provider if your symptoms interfere with home or work and you do not feel like your condition is improving.  Where to find help and support:  You can get help and support from these sources:  · Self-help groups.  · Online and community organizations.  · A trusted spiritual leader.  · Couples counseling.  · Family education classes.  · Family therapy.    Follow these instructions at home:  · Eat a healthy diet that includes plenty of vegetables, fruits, whole grains, low-fat dairy products, and lean protein. Do not eat a lot of foods that are high in solid fats, added sugars, or salt.  · Exercise. Most adults should do the following:  ? Exercise for at least 150 minutes each week. The exercise should increase your heart rate and make you sweat (moderate-intensity exercise).  ? Strengthening exercises at least twice a week.  · Cut down on caffeine, tobacco, alcohol, and other potentially harmful substances.  · Get the right amount and quality of sleep. Most adults need 7-9 hours of sleep each night.  · Make choices that simplify your life.  · Take over-the-counter and prescription medicines only as told by your health care provider.  · Avoid caffeine, alcohol, and certain over-the-counter cold medicines. These may make you feel worse. Ask your pharmacist which medicines to avoid.  · Keep all follow-up visits as told by your  health care provider. This is important.  Questions to ask your health care provider  · Would I benefit from therapy?  · How often should I follow up with a health care provider?  · How long do I need to take medicine?  · Are there any long-term side effects of my medicine?  · Are there any alternatives to taking medicine?  Contact a health care provider if:  · You have a hard time staying focused or finishing daily tasks.  · You spend many hours a day feeling worried about everyday life.  · You become exhausted by worry.  · You start to have headaches, feel tense, or have nausea.  · You urinate more than normal.  · You have diarrhea.  Get help right away if:  · You have a racing heart and shortness of breath.  · You have thoughts of hurting yourself or others.  If you ever feel like you may hurt yourself or others, or have thoughts about taking your own life, get help right away. You can go to your nearest emergency department or call:  · Your local emergency services (911 in the [place]  · A suicide crisis helpline, such as the National Suicide Prevention Lifeline at 1-500.965.6037. This is open 24-hours a day.    Summary  · Taking steps to deal with stress can help calm you.  · Medicines cannot cure anxiety disorders, but they can help ease symptoms.  · Family, friends, and partners can play a big part in helping you recover from an anxiety disorder.  This information is not intended to replace advice given to you by your health care provider. Make sure you discuss any questions you have with your health care provider.  Document Released: 12/12/2017 Document Revised: 12/12/2017 Document Reviewed: 12/12/2017  [employer] Interactive Patient Education © 2019 [employer]

## 2019-06-28 NOTE — PROGRESS NOTES
June Melchor is a 63 y.o. female. Who presents to the clinic today c/o a rash  which started three to four  days ago and is located under both her breasts. Associated symptoms include erythema and itchiness. She has tried Nystatin cream which she has been prescribed previously with improvement.   In addition, she reports the Buspar is just not working. She continues to be anxious and unable to sleep. She has had difficulty in getting the Vistaril due to an issue with the PA her pharmacy reports.   Refer to ROS for additional information.     Rash   This is a new problem. The current episode started in the past 7 days. The problem has been gradually worsening since onset. The affected locations include the chest. The rash is characterized by redness and itchiness. She was exposed to nothing. Pertinent negatives include no anorexia, congestion, cough, fatigue, shortness of breath, sore throat or vomiting. Treatments tried: Nystatin cream. The treatment provided significant relief. There is no history of allergies or eczema.      The following portions of the patient's history were reviewed and updated as appropriate: allergies, current medications, past family history, past medical history, past social history, past surgical history and problem list.    Current Outpatient Medications:   •  fenofibrate (TRICOR) 145 MG tablet, Take 1 tablet by mouth Daily., Disp: 90 tablet, Rfl: 3  •  levothyroxine (SYNTHROID, LEVOTHROID) 100 MCG tablet, Take 1 tablet by mouth Daily., Disp: 90 tablet, Rfl: 3  •  losartan (COZAAR) 100 MG tablet, Take 1 tablet by mouth Daily., Disp: 90 tablet, Rfl: 3  •  rosuvastatin (CRESTOR) 20 MG tablet, Take 1 tablet by mouth Every Night., Disp: 90 tablet, Rfl: 3  •  escitalopram (LEXAPRO) 10 MG tablet, Take 1 tablet by mouth Daily., Disp: 30 tablet, Rfl: 0  •  hydrOXYzine pamoate (VISTARIL) 50 MG capsule, Take 1 capsule by mouth 3 (Three) Times a Day As Needed for Anxiety., Disp: 270 capsule,  "Rfl: 3  •  nystatin (MYCOSTATIN) 367029 UNIT/GM cream, Apply  topically to the appropriate area as directed 2 (Two) Times a Day As Needed (rash)., Disp: 30 g, Rfl: 1  •  nystatin (MYCOSTATIN) 767280 UNIT/GM powder, Apply  topically to the appropriate area as directed 3 (Three) Times a Day., Disp: 60 g, Rfl: 1    Allergies   Allergen Reactions   • Amoxicillin    • Ampicillin      Review of Systems   Constitutional: Positive for activity change. Negative for appetite change, chills and fatigue.   HENT: Negative for congestion and sore throat.    Eyes: Negative for visual disturbance.   Respiratory: Negative for cough, shortness of breath and wheezing.    Gastrointestinal: Negative for anorexia, nausea and vomiting.   Endocrine: Positive for cold intolerance.   Musculoskeletal: Positive for arthralgias (Baseline).   Skin: Positive for rash. Negative for color change.   Neurological: Negative for dizziness and headaches.   Hematological: Bruises/bleeds easily.   Psychiatric/Behavioral: Positive for sleep disturbance. Negative for confusion and suicidal ideas. The patient is nervous/anxious.      Visit Vitals  /68 (BP Location: Right arm, Patient Position: Sitting, Cuff Size: Adult)   Pulse 74   Temp 99 °F (37.2 °C) (Tympanic)   Resp 14   Ht 165.1 cm (65\")   Wt 84 kg (185 lb 3.2 oz)   SpO2 98%   BMI 30.82 kg/m²       Physical Exam   Constitutional: She is oriented to person, place, and time. She appears well-developed and well-nourished. No distress.   HENT:   Head: Normocephalic.   Nose: Nose normal.   Mouth/Throat: Oropharynx is clear and moist and mucous membranes are normal. No oropharyngeal exudate.   Eyes: Conjunctivae are normal. Pupils are equal, round, and reactive to light. Right eye exhibits no discharge. Left eye exhibits no discharge. No scleral icterus.   Neck: Neck supple.   Cardiovascular: Normal rate, regular rhythm and normal heart sounds. Exam reveals no friction rub.   No murmur " heard.  Pulmonary/Chest: Effort normal and breath sounds normal. No respiratory distress. She has no wheezes. She has no rales.   Lymphadenopathy:     She has no cervical adenopathy.   Neurological: She is alert and oriented to person, place, and time.   Skin: Skin is warm and dry. Capillary refill takes less than 2 seconds. Rash noted. There is erythema.   Bilateral erythematous rash under both breasts which has a Candidiasis  like appearance   Psychiatric: Her speech is normal. Judgment and thought content normal. Her mood appears anxious.   Vitals reviewed.    Assessment/Plan   Diagnoses and all orders for this visit:    Rash  -     nystatin (MYCOSTATIN) 824979 UNIT/GM powder; Apply  topically to the appropriate area as directed 3 (Three) Times a Day.  -     nystatin (MYCOSTATIN) 938140 UNIT/GM cream; Apply  topically to the appropriate area as directed 2 (Two) Times a Day As Needed (rash).    Generalized anxiety disorder  -     escitalopram (LEXAPRO) 10 MG tablet; Take 1 tablet by mouth Daily.    Findings and recommendations discussed with June. Treatment options reviewed. Counseled her regarding supportive care measures.  Also, the issue with Vistaril was resolved and should be covered under her insurance for $4. Encouraged June to f/u with her PCP in July.          This document has been electronically signed by GUILLERMO Wu, SUMIT ROSA  June 28, 2019 9:30 AM

## 2019-07-16 ENCOUNTER — OFFICE VISIT (OUTPATIENT)
Dept: FAMILY MEDICINE CLINIC | Facility: CLINIC | Age: 64
End: 2019-07-16

## 2019-07-16 VITALS
WEIGHT: 185 LBS | SYSTOLIC BLOOD PRESSURE: 106 MMHG | HEIGHT: 65 IN | TEMPERATURE: 98.4 F | OXYGEN SATURATION: 97 % | HEART RATE: 70 BPM | BODY MASS INDEX: 30.82 KG/M2 | DIASTOLIC BLOOD PRESSURE: 60 MMHG

## 2019-07-16 DIAGNOSIS — F41.1 GENERALIZED ANXIETY DISORDER: ICD-10-CM

## 2019-07-16 DIAGNOSIS — F51.01 PRIMARY INSOMNIA: ICD-10-CM

## 2019-07-16 DIAGNOSIS — E78.2 MIXED HYPERLIPIDEMIA: ICD-10-CM

## 2019-07-16 DIAGNOSIS — B37.2 CANDIDIASIS, INTERTRIGINOUS: Primary | ICD-10-CM

## 2019-07-16 DIAGNOSIS — I10 BENIGN ESSENTIAL HYPERTENSION: ICD-10-CM

## 2019-07-16 DIAGNOSIS — L30.1 DYSHIDROTIC ECZEMA: ICD-10-CM

## 2019-07-16 DIAGNOSIS — E03.4 HYPOTHYROIDISM DUE TO ACQUIRED ATROPHY OF THYROID: ICD-10-CM

## 2019-07-16 PROCEDURE — 99214 OFFICE O/P EST MOD 30 MIN: CPT | Performed by: PHYSICIAN ASSISTANT

## 2019-07-16 RX ORDER — LEVOTHYROXINE SODIUM 0.1 MG/1
100 TABLET ORAL DAILY
Qty: 90 TABLET | Refills: 3 | Status: SHIPPED | OUTPATIENT
Start: 2019-07-16 | End: 2020-07-06 | Stop reason: SDUPTHER

## 2019-07-16 RX ORDER — FENOFIBRATE 145 MG/1
145 TABLET, COATED ORAL DAILY
Qty: 90 TABLET | Refills: 3 | Status: SHIPPED | OUTPATIENT
Start: 2019-07-16 | End: 2020-07-06 | Stop reason: SDUPTHER

## 2019-07-16 RX ORDER — LOSARTAN POTASSIUM 100 MG/1
100 TABLET ORAL DAILY
Qty: 90 TABLET | Refills: 3 | Status: SHIPPED | OUTPATIENT
Start: 2019-07-16 | End: 2020-06-18

## 2019-07-16 RX ORDER — HYDROXYZINE PAMOATE 50 MG/1
50 CAPSULE ORAL 3 TIMES DAILY PRN
Qty: 270 CAPSULE | Refills: 3 | Status: SHIPPED | OUTPATIENT
Start: 2019-07-16 | End: 2020-02-20

## 2019-07-16 RX ORDER — TRIAMCINOLONE ACETONIDE 1 MG/ML
LOTION TOPICAL 2 TIMES DAILY
Qty: 120 ML | Refills: 5 | Status: SHIPPED | OUTPATIENT
Start: 2019-07-16 | End: 2019-09-23

## 2019-07-16 RX ORDER — FLUCONAZOLE 150 MG/1
150 TABLET ORAL ONCE
Qty: 1 TABLET | Refills: 0 | Status: SHIPPED | OUTPATIENT
Start: 2019-07-16 | End: 2019-07-16

## 2019-07-16 RX ORDER — ROSUVASTATIN CALCIUM 20 MG/1
20 TABLET, COATED ORAL NIGHTLY
Qty: 90 TABLET | Refills: 3 | Status: SHIPPED | OUTPATIENT
Start: 2019-07-16 | End: 2020-05-18

## 2019-07-21 NOTE — PATIENT INSTRUCTIONS
"Fat and Cholesterol Restricted Eating Plan  Getting too much fat and cholesterol in your diet may cause health problems. Choosing the right foods helps keep your fat and cholesterol at normal levels. This can keep you from getting certain diseases.  Your doctor may recommend an eating plan that includes:  · Total fat: ______% or less of total calories a day.  · Saturated fat: ______% or less of total calories a day.  · Cholesterol: less than _________mg a day.  · Fiber: ______g a day.    What are tips for following this plan?  General tips  · Work with your doctor to lose weight if you need to.  · Avoid:  ? Foods with added sugar.  ? Fried foods.  ? Foods with partially hydrogenated oils.  · Limit alcohol intake to no more than 1 drink a day for nonpregnant women and 2 drinks a day for men. One drink equals 12 oz of beer, 5 oz of wine, or 1½ oz of hard liquor.  Reading food labels  · Check food labels for:  ? Trans fats.  ? Partially hydrogenated oils.  ? Saturated fat (g) in each serving.  ? Cholesterol (mg) in each serving.  ? Fiber (g) in each serving.  · Choose foods with healthy fats, such as:  ? Monounsaturated fats.  ? Polyunsaturated fats.  ? Omega-3 fats.  · Choose grain products that have whole grains. Look for the word \"whole\" as the first word in the ingredient list.  Cooking  · Cook foods using low-fat methods. These include baking, boiling, grilling, and broiling.  · Eat more home-cooked foods. Eat at restaurants and buffets less often.  · Avoid cooking using saturated fats, such as butter, cream, palm oil, palm kernel oil, and coconut oil.  Meal planning  · At meals, divide your plate into four equal parts:  ? Fill one-half of your plate with vegetables and green salads.  ? Fill one-fourth of your plate with whole grains.  ? Fill one-fourth of your plate with low-fat (lean) protein foods.  · Eat fish that is high in omega-3 fats at least two times a week. This includes mackerel, tuna, sardines, and " salmon.  · Eat foods that are high in fiber, such as whole grains, beans, apples, broccoli, carrots, peas, and barley.  Recommended foods  Grains  · Whole grains, such as whole wheat or whole grain breads, crackers, cereals, and pasta. Unsweetened oatmeal, bulgur, barley, quinoa, or brown rice. Corn or whole wheat flour tortillas.  Vegetables  · Fresh or frozen vegetables (raw, steamed, roasted, or grilled). Green salads.  Fruits  · All fresh, canned (in natural juice), or frozen fruits.  Meats and other protein foods  · Ground beef (85% or leaner), grass-fed beef, or beef trimmed of fat. Skinless chicken or turkey. Ground chicken or turkey. Pork trimmed of fat. All fish and seafood. Egg whites. Dried beans, peas, or lentils. Unsalted nuts or seeds. Unsalted canned beans. Nut butters without added sugar or oil.  Dairy  · Low-fat or nonfat dairy products, such as skim or 1% milk, 2% or reduced-fat cheeses, low-fat and fat-free ricotta or cottage cheese, or plain low-fat and nonfat yogurt.  Fats and oils  · Tub margarine without trans fats. Light or reduced-fat mayonnaise and salad dressings. Avocado. Olive, canola, sesame, or safflower oils.  The items listed above may not be a complete list of recommended foods or beverages. Contact your dietitian for more options.  Foods to avoid  Grains  · White bread. White pasta. White rice. Cornbread. Bagels, pastries, and croissants. Crackers and snack foods that contain trans fat and hydrogenated oils.  Vegetables  · Vegetables cooked in cheese, cream, or butter sauce. Fried vegetables.  Fruits  · Canned fruit in heavy syrup. Fruit in cream or butter sauce. Fried fruit.  Meats and other protein foods  · Fatty cuts of meat. Ribs, chicken wings, morfin, sausage, bologna, salami, chitterlings, fatback, hot dogs, bratwurst, and packaged lunch meats. Liver and organ meats. Whole eggs and egg yolks. Chicken and turkey with skin. Fried meat.  Dairy  · Whole or 2% milk, cream,  half-and-half, and cream cheese. Whole milk cheeses. Whole-fat or sweetened yogurt. Full-fat cheeses. Nondairy creamers and whipped toppings. Processed cheese, cheese spreads, and cheese curds.  Beverages  · Alcohol. Sugar-sweetened drinks such as sodas, lemonade, and fruit drinks.  Fats and oils  · Butter, stick margarine, lard, shortening, ghee, or morfin fat. Coconut, palm kernel, and palm oils.  Sweets and desserts  · Corn syrup, sugars, honey, and molasses. Candy. Jam and jelly. Syrup. Sweetened cereals. Cookies, pies, cakes, donuts, muffins, and ice cream.  The items listed above may not be a complete list of foods and beverages to avoid. Contact your dietitian for more information.  Summary  · Choosing the right foods helps keep your fat and cholesterol at normal levels. This can keep you from getting certain diseases.  · At meals, fill one-half of your plate with vegetables and green salads.  · Eat high-fiber foods, like whole grains, beans, apples, carrots, peas, and barley.  · Limit added sugar, saturated fats, alcohol, and fried foods.  This information is not intended to replace advice given to you by your health care provider. Make sure you discuss any questions you have with your health care provider.  Document Released: 06/18/2013 Document Revised: 09/04/2018 Document Reviewed: 09/04/2018  [employer] Interactive Patient Education © 2019 [employer]

## 2019-07-21 NOTE — PROGRESS NOTES
Subjective   Genanupama Melchor is a 63 y.o. female.       Chief Complaint -rash    History of Present Illness -      Rash-  She complains of a erythematous moist rash under abdominal folds.  Rash occurs intermittently for the past 9 months.  Worse when she uses a certain brand of bar soap.  She states that she is discontinued using this type of soap.  Not at goal    Rash- she complains of a dry cracking tender rash on her hands.  Onset greater than 5 years.  Worse with wet hands or washing her hands.    Generalized anxiety disorder- stable with Vistaril.  She reports that she was unable to tolerate Lexapro because it made her feel funny.  She discontinued BuSpar in the past due to not being efficacious.  She denies any SI or HI.    Hyperlipidemia-improved with Crestor  Lab Results   Component Value Date    CHOL 171 05/31/2019    CHOL 150 08/21/2018    CHOL 210 (H) 06/22/2018     Lab Results   Component Value Date    TRIG 63 05/31/2019    TRIG 94 08/21/2018    TRIG 503 (H) 06/22/2018     Lab Results   Component Value Date    HDL 71 (H) 05/31/2019    HDL 60 08/21/2018    HDL 58 (L) 06/22/2018     Lab Results   Component Value Date    LDL 87 05/31/2019    LDL 71 08/21/2018    LDL  06/22/2018      Comment:      Unable to calculate     Insomnia-stable with hydroxyzine    Hypothyroidism-stable with Synthroid    Hypertension-controlled with losartan    The following portions of the patient's history were reviewed and updated as appropriate: allergies, current medications, past family history, past medical history, past social history, past surgical history and problem list.    Review of Systems   Constitutional: Negative for activity change, appetite change and fever.   HENT: Negative for ear pain, sinus pressure and sore throat.    Eyes: Negative for pain and visual disturbance.   Respiratory: Negative for cough and chest tightness.    Cardiovascular: Negative for chest pain and palpitations.   Gastrointestinal: Negative  "for abdominal pain, constipation, diarrhea, nausea and vomiting.   Endocrine: Negative for polydipsia and polyuria.   Genitourinary: Negative for dysuria and frequency.   Musculoskeletal: Negative for back pain and myalgias.   Skin: Positive for rash. Negative for color change.   Allergic/Immunologic: Negative for food allergies and immunocompromised state.   Neurological: Negative for dizziness, syncope and headaches.   Hematological: Negative for adenopathy. Does not bruise/bleed easily.   Psychiatric/Behavioral: Negative for hallucinations and suicidal ideas. The patient is not nervous/anxious.        /60   Pulse 70   Temp 98.4 °F (36.9 °C) (Oral)   Ht 165.1 cm (65\")   Wt 83.9 kg (185 lb)   SpO2 97%   BMI 30.79 kg/m²   Lab on 05/31/2019   Component Date Value Ref Range Status   • Hepatitis C Ab 05/31/2019 Non-Reactive  Non-Reactive Final   • TSH 05/31/2019 3.670  0.270 - 4.200 mIU/mL Final   • Glucose 05/31/2019 87  65 - 99 mg/dL Final   • BUN 05/31/2019 18  8 - 23 mg/dL Final   • Creatinine 05/31/2019 1.10* 0.57 - 1.00 mg/dL Final   • Sodium 05/31/2019 144  136 - 145 mmol/L Final   • Potassium 05/31/2019 4.0  3.5 - 5.2 mmol/L Final   • Chloride 05/31/2019 106  98 - 107 mmol/L Final   • CO2 05/31/2019 26.8  22.0 - 29.0 mmol/L Final   • Calcium 05/31/2019 9.6  8.6 - 10.5 mg/dL Final   • Total Protein 05/31/2019 7.2  6.0 - 8.5 g/dL Final   • Albumin 05/31/2019 4.40  3.50 - 5.20 g/dL Final   • ALT (SGPT) 05/31/2019 19  1 - 33 U/L Final   • AST (SGOT) 05/31/2019 23  1 - 32 U/L Final   • Alkaline Phosphatase 05/31/2019 67  39 - 117 U/L Final   • Total Bilirubin 05/31/2019 0.4  0.2 - 1.2 mg/dL Final   • eGFR Non African Amer 05/31/2019 50* >60 mL/min/1.73 Final   • Globulin 05/31/2019 2.8  gm/dL Final   • A/G Ratio 05/31/2019 1.6  g/dL Final   • BUN/Creatinine Ratio 05/31/2019 16.4  7.0 - 25.0 Final   • Anion Gap 05/31/2019 11.2  mmol/L Final   • Total Cholesterol 05/31/2019 171  0 - 200 mg/dL Final   • " Triglycerides 05/31/2019 63  0 - 150 mg/dL Final   • HDL Cholesterol 05/31/2019 71* 40 - 60 mg/dL Final   • LDL Cholesterol  05/31/2019 87  0 - 100 mg/dL Final   • VLDL Cholesterol 05/31/2019 12.6  5 - 40 mg/dL Final   • LDL/HDL Ratio 05/31/2019 1.23   Final   • Free T4 05/31/2019 1.47  0.93 - 1.70 ng/dL Final   • Hemoglobin A1C 05/31/2019 5.20  4.80 - 5.60 % Final       Physical Exam   Constitutional: She is oriented to person, place, and time. She appears well-developed and well-nourished.   HENT:   Head: Normocephalic and atraumatic.   Nose: Nose normal.   Mouth/Throat: Oropharynx is clear and moist.   Eyes: Conjunctivae and EOM are normal. Pupils are equal, round, and reactive to light.   Neck: Normal range of motion. Neck supple. No tracheal deviation present. No thyromegaly present.   Cardiovascular: Normal rate, regular rhythm, normal heart sounds and intact distal pulses.   No murmur heard.  Pulmonary/Chest: Effort normal and breath sounds normal. No respiratory distress. She has no wheezes.   Abdominal: Soft. Bowel sounds are normal. There is no tenderness. There is no guarding.   Musculoskeletal: Normal range of motion. She exhibits no edema or tenderness.   Lymphadenopathy:     She has no cervical adenopathy.   Neurological: She is alert and oriented to person, place, and time.   Skin: Skin is warm and dry. Rash noted.   Mildly erythematous moist skin noted under lower abdominal folds.  Second rash noted on hands with mildly erythematous peeling of skin and cracking of skin on palmar surface of hands and fingers   Psychiatric: She has a normal mood and affect. Her behavior is normal.   Nursing note and vitals reviewed.      Assessment/Plan     Diagnoses and all orders for this visit:    Candidiasis, intertriginous  -     fluconazole (DIFLUCAN) 150 MG tablet; Take 1 tablet by mouth 1 (One) Time for 1 dose.    Dyshidrotic eczema  Comments:  Discussed the use of Eucrisa in the future  Orders:  -      triamcinolone (KENALOG) 0.1 % lotion; Apply  topically to the appropriate area as directed 2 (Two) Times a Day.    Generalized anxiety disorder  Comments:  Continue Vistaril    Mixed hyperlipidemia  -     fenofibrate (TRICOR) 145 MG tablet; Take 1 tablet by mouth Daily.  -     rosuvastatin (CRESTOR) 20 MG tablet; Take 1 tablet by mouth Every Night.    Primary insomnia  Comments:  Discontinue trazodone due to size  Continue hydroxyzine  Orders:  -     hydrOXYzine pamoate (VISTARIL) 50 MG capsule; Take 1 capsule by mouth 3 (Three) Times a Day As Needed for Anxiety.    Hypothyroidism due to acquired atrophy of thyroid  -     levothyroxine (SYNTHROID, LEVOTHROID) 100 MCG tablet; Take 1 tablet by mouth Daily.    Benign essential hypertension  -     losartan (COZAAR) 100 MG tablet; Take 1 tablet by mouth Daily.           ACO Quality Measures:    1)   Patient's Body mass index is 30.79 kg/m². BMI is above normal parameters. Recommendations include: educational material.      2)   June Melchor is a nonsmoker           This document has been electronically signed by:  Paulette Wallis PA-C

## 2019-08-16 DIAGNOSIS — R21 RASH: ICD-10-CM

## 2019-08-16 RX ORDER — NYSTATIN 100000 U/G
CREAM TOPICAL
Qty: 30 G | Refills: 1 | Status: SHIPPED | OUTPATIENT
Start: 2019-08-16 | End: 2019-08-26

## 2019-08-26 ENCOUNTER — OFFICE VISIT (OUTPATIENT)
Dept: FAMILY MEDICINE CLINIC | Facility: CLINIC | Age: 64
End: 2019-08-26

## 2019-08-26 VITALS
WEIGHT: 182 LBS | SYSTOLIC BLOOD PRESSURE: 110 MMHG | DIASTOLIC BLOOD PRESSURE: 70 MMHG | TEMPERATURE: 98.1 F | BODY MASS INDEX: 30.32 KG/M2 | HEIGHT: 65 IN | HEART RATE: 72 BPM | OXYGEN SATURATION: 98 %

## 2019-08-26 DIAGNOSIS — B37.2 CANDIDIASIS, INTERTRIGINOUS: Primary | ICD-10-CM

## 2019-08-26 DIAGNOSIS — I10 BENIGN ESSENTIAL HYPERTENSION: ICD-10-CM

## 2019-08-26 DIAGNOSIS — E03.4 HYPOTHYROIDISM DUE TO ACQUIRED ATROPHY OF THYROID: ICD-10-CM

## 2019-08-26 DIAGNOSIS — E78.2 MIXED HYPERLIPIDEMIA: ICD-10-CM

## 2019-08-26 PROCEDURE — 99214 OFFICE O/P EST MOD 30 MIN: CPT | Performed by: PHYSICIAN ASSISTANT

## 2019-08-26 RX ORDER — FLUCONAZOLE 150 MG/1
150 TABLET ORAL DAILY
Qty: 10 TABLET | Refills: 1 | Status: SHIPPED | OUTPATIENT
Start: 2019-08-26 | End: 2019-12-16

## 2019-08-26 RX ORDER — NYSTATIN 100000 U/G
CREAM TOPICAL AS NEEDED
Qty: 30 G | Refills: 5 | Status: SHIPPED | OUTPATIENT
Start: 2019-08-26 | End: 2019-09-23

## 2019-08-26 NOTE — PROGRESS NOTES
Subjective   Genople Jill is a 63 y.o. female.       Chief Complaint -rash    History of Present Illness -      Rash-  She complains of rash under bilateral breasts that is worse with sweating.  Rash is recurrent and some relief with nystatin cream in the past.  She is now out of this medication.  Onset of current rash 1 week.  Pain is described as mild to moderate throbbing.    Hyperlipidemia- improved with Crestor and Lipitor  Lab Results   Component Value Date    CHOL 171 05/31/2019    CHOL 150 08/21/2018    CHOL 210 (H) 06/22/2018     Lab Results   Component Value Date    TRIG 63 05/31/2019    TRIG 94 08/21/2018    TRIG 503 (H) 06/22/2018     Lab Results   Component Value Date    HDL 71 (H) 05/31/2019    HDL 60 08/21/2018    HDL 58 (L) 06/22/2018     Lab Results   Component Value Date    LDL 87 05/31/2019    LDL 71 08/21/2018    LDL  06/22/2018      Comment:      Unable to calculate     Hypertension-controlled with losartan    Hypothyroidism-stable with Synthroid 100 mcg daily.  She denies any thyroid related symptoms.  Lab Results   Component Value Date    TSH 3.670 05/31/2019         The following portions of the patient's history were reviewed and updated as appropriate: allergies, current medications, past family history, past medical history, past social history, past surgical history and problem list.    Review of Systems   Constitutional: Negative for activity change, appetite change and fever.   HENT: Negative for ear pain, sinus pressure and sore throat.    Eyes: Negative for pain and visual disturbance.   Respiratory: Negative for cough and chest tightness.    Cardiovascular: Negative for chest pain and palpitations.   Gastrointestinal: Negative for abdominal pain, constipation, diarrhea, nausea and vomiting.   Endocrine: Negative for polydipsia and polyuria.   Genitourinary: Negative for dysuria and frequency.   Musculoskeletal: Negative for back pain and myalgias.   Skin: Positive for rash.  "Negative for color change.   Allergic/Immunologic: Negative for food allergies and immunocompromised state.   Neurological: Negative for dizziness, syncope and headaches.   Hematological: Negative for adenopathy. Does not bruise/bleed easily.   Psychiatric/Behavioral: Negative for hallucinations and suicidal ideas. The patient is not nervous/anxious.        /70   Pulse 72   Temp 98.1 °F (36.7 °C) (Temporal)   Ht 165.1 cm (65\")   Wt 82.6 kg (182 lb)   SpO2 98%   BMI 30.29 kg/m²   Lab on 05/31/2019   Component Date Value Ref Range Status   • Hepatitis C Ab 05/31/2019 Non-Reactive  Non-Reactive Final   • TSH 05/31/2019 3.670  0.270 - 4.200 mIU/mL Final   • Glucose 05/31/2019 87  65 - 99 mg/dL Final   • BUN 05/31/2019 18  8 - 23 mg/dL Final   • Creatinine 05/31/2019 1.10* 0.57 - 1.00 mg/dL Final   • Sodium 05/31/2019 144  136 - 145 mmol/L Final   • Potassium 05/31/2019 4.0  3.5 - 5.2 mmol/L Final   • Chloride 05/31/2019 106  98 - 107 mmol/L Final   • CO2 05/31/2019 26.8  22.0 - 29.0 mmol/L Final   • Calcium 05/31/2019 9.6  8.6 - 10.5 mg/dL Final   • Total Protein 05/31/2019 7.2  6.0 - 8.5 g/dL Final   • Albumin 05/31/2019 4.40  3.50 - 5.20 g/dL Final   • ALT (SGPT) 05/31/2019 19  1 - 33 U/L Final   • AST (SGOT) 05/31/2019 23  1 - 32 U/L Final   • Alkaline Phosphatase 05/31/2019 67  39 - 117 U/L Final   • Total Bilirubin 05/31/2019 0.4  0.2 - 1.2 mg/dL Final   • eGFR Non African Amer 05/31/2019 50* >60 mL/min/1.73 Final   • Globulin 05/31/2019 2.8  gm/dL Final   • A/G Ratio 05/31/2019 1.6  g/dL Final   • BUN/Creatinine Ratio 05/31/2019 16.4  7.0 - 25.0 Final   • Anion Gap 05/31/2019 11.2  mmol/L Final   • Total Cholesterol 05/31/2019 171  0 - 200 mg/dL Final   • Triglycerides 05/31/2019 63  0 - 150 mg/dL Final   • HDL Cholesterol 05/31/2019 71* 40 - 60 mg/dL Final   • LDL Cholesterol  05/31/2019 87  0 - 100 mg/dL Final   • VLDL Cholesterol 05/31/2019 12.6  5 - 40 mg/dL Final   • LDL/HDL Ratio 05/31/2019 1.23  "  Final   • Free T4 05/31/2019 1.47  0.93 - 1.70 ng/dL Final   • Hemoglobin A1C 05/31/2019 5.20  4.80 - 5.60 % Final       Physical Exam   Constitutional: She is oriented to person, place, and time. She appears well-developed and well-nourished.   HENT:   Head: Normocephalic and atraumatic.   Nose: Nose normal.   Mouth/Throat: Oropharynx is clear and moist.   Eyes: Conjunctivae and EOM are normal. Pupils are equal, round, and reactive to light.   Neck: Normal range of motion. Neck supple. No tracheal deviation present. No thyromegaly present.   Cardiovascular: Normal rate, regular rhythm, normal heart sounds and intact distal pulses.   No murmur heard.  Pulmonary/Chest: Effort normal and breath sounds normal. No respiratory distress. She has no wheezes.   Abdominal: Soft. Bowel sounds are normal. There is no tenderness. There is no guarding.   Musculoskeletal: Normal range of motion. She exhibits no edema or tenderness.   Lymphadenopathy:     She has no cervical adenopathy.   Neurological: She is alert and oriented to person, place, and time.   Skin: Skin is warm and dry. Rash noted. There is erythema.   Erythematous moist skin noted under her left breast and right breast intertriginous areas   Psychiatric: She has a normal mood and affect. Her behavior is normal.   Nursing note and vitals reviewed.      Assessment/Plan     Diagnoses and all orders for this visit:    Candidiasis, intertriginous  -     fluconazole (DIFLUCAN) 150 MG tablet; Take 1 tablet by mouth Daily.  -     nystatin (MYCOSTATIN) 122717 UNIT/GM cream; Apply  topically to the appropriate area as directed As Needed (rash).    Mixed hyperlipidemia  Comments:  Continue Crestor, TriCor, and advised low-cholesterol diet    Benign essential hypertension  Comments:  Continue losartan    Hypothyroidism due to acquired atrophy of thyroid  Comments:  Continue Synthroid 100 mcg daily                   This document has been electronically signed by:  Paulette  AGNES Wallis

## 2019-09-23 ENCOUNTER — OFFICE VISIT (OUTPATIENT)
Dept: FAMILY MEDICINE CLINIC | Facility: CLINIC | Age: 64
End: 2019-09-23

## 2019-09-23 VITALS
BODY MASS INDEX: 30.32 KG/M2 | TEMPERATURE: 98.1 F | SYSTOLIC BLOOD PRESSURE: 130 MMHG | HEIGHT: 65 IN | WEIGHT: 182 LBS | OXYGEN SATURATION: 96 % | DIASTOLIC BLOOD PRESSURE: 70 MMHG | HEART RATE: 81 BPM

## 2019-09-23 DIAGNOSIS — B37.2 CANDIDIASIS, INTERTRIGINOUS: Primary | ICD-10-CM

## 2019-09-23 DIAGNOSIS — I10 BENIGN ESSENTIAL HYPERTENSION: ICD-10-CM

## 2019-09-23 DIAGNOSIS — E78.2 MIXED HYPERLIPIDEMIA: ICD-10-CM

## 2019-09-23 DIAGNOSIS — L30.1 DYSHIDROTIC HAND DERMATITIS: ICD-10-CM

## 2019-09-23 PROCEDURE — 99214 OFFICE O/P EST MOD 30 MIN: CPT | Performed by: PHYSICIAN ASSISTANT

## 2019-09-23 RX ORDER — NYSTATIN 100000 [USP'U]/G
POWDER TOPICAL 4 TIMES DAILY
Qty: 60 G | Refills: 5 | Status: SHIPPED | OUTPATIENT
Start: 2019-09-23 | End: 2019-12-16

## 2019-09-24 NOTE — PROGRESS NOTES
Subjective   Genople Jill is a 64 y.o. female.       Chief Complaint -rash    History of Present Illness -      Rash-  She complains of a mildly erythematous pruritic rash under her breasts that is worse with sweating.  Onset 1 month.  She reports some relief with Diflucan 150 mg daily but rash has not completely resolved.    Skin problem hands-  She complains of dry cracking and swelling of fingers and palms of hands.  Onset greater than 6 months.  Minimal relief with triamcinolone 0.1% lotion.    Hypertension-controlled with losartan    Hyperlipidemia-stable with TriCor and Crestor  Lab Results   Component Value Date    CHOL 171 05/31/2019    CHOL 150 08/21/2018    CHOL 210 (H) 06/22/2018     Lab Results   Component Value Date    TRIG 63 05/31/2019    TRIG 94 08/21/2018    TRIG 503 (H) 06/22/2018     Lab Results   Component Value Date    HDL 71 (H) 05/31/2019    HDL 60 08/21/2018    HDL 58 (L) 06/22/2018     Lab Results   Component Value Date    LDL 87 05/31/2019    LDL 71 08/21/2018    LDL  06/22/2018      Comment:      Unable to calculate         The following portions of the patient's history were reviewed and updated as appropriate: allergies, current medications, past family history, past medical history, past social history, past surgical history and problem list.    Review of Systems   Constitutional: Negative for activity change, appetite change and fever.   HENT: Negative for ear pain, sinus pressure and sore throat.    Eyes: Negative for pain and visual disturbance.   Respiratory: Negative for cough and chest tightness.    Cardiovascular: Negative for chest pain and palpitations.   Gastrointestinal: Negative for abdominal pain, constipation, diarrhea, nausea and vomiting.   Endocrine: Negative for polydipsia and polyuria.   Genitourinary: Negative for dysuria and frequency.   Musculoskeletal: Negative for back pain and myalgias.   Skin: Positive for rash. Negative for color change.        Skin problem  "hands   Allergic/Immunologic: Negative for food allergies and immunocompromised state.   Neurological: Negative for dizziness, syncope and headaches.   Hematological: Negative for adenopathy. Does not bruise/bleed easily.   Psychiatric/Behavioral: Negative for hallucinations and suicidal ideas. The patient is not nervous/anxious.        /70   Pulse 81   Temp 98.1 °F (36.7 °C) (Temporal)   Ht 165.1 cm (65\")   Wt 82.6 kg (182 lb)   SpO2 96%   BMI 30.29 kg/m²   Lab on 05/31/2019   Component Date Value Ref Range Status   • Hepatitis C Ab 05/31/2019 Non-Reactive  Non-Reactive Final   • TSH 05/31/2019 3.670  0.270 - 4.200 mIU/mL Final   • Glucose 05/31/2019 87  65 - 99 mg/dL Final   • BUN 05/31/2019 18  8 - 23 mg/dL Final   • Creatinine 05/31/2019 1.10* 0.57 - 1.00 mg/dL Final   • Sodium 05/31/2019 144  136 - 145 mmol/L Final   • Potassium 05/31/2019 4.0  3.5 - 5.2 mmol/L Final   • Chloride 05/31/2019 106  98 - 107 mmol/L Final   • CO2 05/31/2019 26.8  22.0 - 29.0 mmol/L Final   • Calcium 05/31/2019 9.6  8.6 - 10.5 mg/dL Final   • Total Protein 05/31/2019 7.2  6.0 - 8.5 g/dL Final   • Albumin 05/31/2019 4.40  3.50 - 5.20 g/dL Final   • ALT (SGPT) 05/31/2019 19  1 - 33 U/L Final   • AST (SGOT) 05/31/2019 23  1 - 32 U/L Final   • Alkaline Phosphatase 05/31/2019 67  39 - 117 U/L Final   • Total Bilirubin 05/31/2019 0.4  0.2 - 1.2 mg/dL Final   • eGFR Non African Amer 05/31/2019 50* >60 mL/min/1.73 Final   • Globulin 05/31/2019 2.8  gm/dL Final   • A/G Ratio 05/31/2019 1.6  g/dL Final   • BUN/Creatinine Ratio 05/31/2019 16.4  7.0 - 25.0 Final   • Anion Gap 05/31/2019 11.2  mmol/L Final   • Total Cholesterol 05/31/2019 171  0 - 200 mg/dL Final   • Triglycerides 05/31/2019 63  0 - 150 mg/dL Final   • HDL Cholesterol 05/31/2019 71* 40 - 60 mg/dL Final   • LDL Cholesterol  05/31/2019 87  0 - 100 mg/dL Final   • VLDL Cholesterol 05/31/2019 12.6  5 - 40 mg/dL Final   • LDL/HDL Ratio 05/31/2019 1.23   Final   • Free T4 " 05/31/2019 1.47  0.93 - 1.70 ng/dL Final   • Hemoglobin A1C 05/31/2019 5.20  4.80 - 5.60 % Final       Physical Exam   Constitutional: She is oriented to person, place, and time. She appears well-developed and well-nourished.   HENT:   Head: Normocephalic and atraumatic.   Nose: Nose normal.   Mouth/Throat: Oropharynx is clear and moist.   Eyes: Conjunctivae and EOM are normal. Pupils are equal, round, and reactive to light.   Neck: Normal range of motion. Neck supple. No tracheal deviation present. No thyromegaly present.   Cardiovascular: Normal rate, regular rhythm, normal heart sounds and intact distal pulses.   No murmur heard.  Pulmonary/Chest: Effort normal and breath sounds normal. No respiratory distress. She has no wheezes.   Abdominal: Soft. Bowel sounds are normal. There is no tenderness. There is no guarding.   Musculoskeletal: Normal range of motion. She exhibits no edema or tenderness.   Lymphadenopathy:     She has no cervical adenopathy.   Neurological: She is alert and oriented to person, place, and time.   Skin: Skin is warm and dry. Rash noted.   Mildly erythematous skin noted under bilateral breasts.  Multiple fingers and palmar surface of bilateral hands noted to be dry with cracking and vesicular lesions   Psychiatric: She has a normal mood and affect. Her behavior is normal.   Nursing note and vitals reviewed.      Assessment/Plan     Diagnoses and all orders for this visit:    Candidiasis, intertriginous  Comments:  Advised keeping intertriginous areas dry and use nystatin powder  Orders:  -     nystatin (MYCOSTATIN) 249520 UNIT/GM powder; Apply  topically to the appropriate area as directed 4 (Four) Times a Day.    Dyshidrotic hand dermatitis  Comments:  Advised to combine Cetaphil lotion, triamcinolone ointment, and Bactroban ointment to apply to hands  Orders:  -     triamcinolone (KENALOG) 0.1 % ointment; Apply  topically to the appropriate area as directed 2 (Two) Times a Day.  -      mupirocin (BACTROBAN) 2 % ointment; Apply  topically to the appropriate area as directed 3 (Three) Times a Day.    Mixed hyperlipidemia  Comments:  Continue TriCor and Crestor    Benign essential hypertension  Comments:  Continue losartan                   This document has been electronically signed by:  Paulette Wallis PA-C

## 2019-12-16 ENCOUNTER — OFFICE VISIT (OUTPATIENT)
Dept: FAMILY MEDICINE CLINIC | Facility: CLINIC | Age: 64
End: 2019-12-16

## 2019-12-16 VITALS
HEART RATE: 75 BPM | DIASTOLIC BLOOD PRESSURE: 80 MMHG | WEIGHT: 189 LBS | SYSTOLIC BLOOD PRESSURE: 136 MMHG | HEIGHT: 65 IN | OXYGEN SATURATION: 97 % | TEMPERATURE: 98.5 F | BODY MASS INDEX: 31.49 KG/M2

## 2019-12-16 DIAGNOSIS — Z00.00 MEDICARE ANNUAL WELLNESS VISIT, SUBSEQUENT: Primary | ICD-10-CM

## 2019-12-16 DIAGNOSIS — F51.01 PRIMARY INSOMNIA: ICD-10-CM

## 2019-12-16 DIAGNOSIS — E78.2 MIXED HYPERLIPIDEMIA: ICD-10-CM

## 2019-12-16 DIAGNOSIS — F32.9 MAJOR DEPRESSION, CHRONIC: ICD-10-CM

## 2019-12-16 DIAGNOSIS — E03.4 HYPOTHYROIDISM DUE TO ACQUIRED ATROPHY OF THYROID: ICD-10-CM

## 2019-12-16 DIAGNOSIS — K21.9 GASTROESOPHAGEAL REFLUX DISEASE WITHOUT ESOPHAGITIS: ICD-10-CM

## 2019-12-16 DIAGNOSIS — F41.1 GENERALIZED ANXIETY DISORDER: ICD-10-CM

## 2019-12-16 DIAGNOSIS — N95.9 MENOPAUSAL AND POSTMENOPAUSAL DISORDER: ICD-10-CM

## 2019-12-16 DIAGNOSIS — I10 BENIGN ESSENTIAL HYPERTENSION: ICD-10-CM

## 2019-12-16 PROCEDURE — G0439 PPPS, SUBSEQ VISIT: HCPCS | Performed by: PHYSICIAN ASSISTANT

## 2019-12-16 RX ORDER — BUSPIRONE HYDROCHLORIDE 15 MG/1
15 TABLET ORAL 3 TIMES DAILY
Qty: 60 TABLET | Refills: 5 | Status: SHIPPED | OUTPATIENT
Start: 2019-12-16 | End: 2020-01-06 | Stop reason: SDUPTHER

## 2019-12-16 RX ORDER — AMITRIPTYLINE HYDROCHLORIDE 50 MG/1
50 TABLET, FILM COATED ORAL SEE ADMIN INSTRUCTIONS
Qty: 30 TABLET | Refills: 5 | Status: SHIPPED | OUTPATIENT
Start: 2019-12-16 | End: 2020-01-06 | Stop reason: SDUPTHER

## 2019-12-16 NOTE — PROGRESS NOTES
The ABCs of the Annual Wellness Visit  Subsequent Medicare Wellness Visit    Chief Complaint   Patient presents with   • Medicare Wellness-subsequent       Subjective   History of Present Illness:  June Melchor is a 64 y.o. female who presents for a Subsequent Medicare Wellness Visit.    Hypertension-controlled with losartan    Hyperlipidemia- stable with Crestor and TriCor  Lab Results   Component Value Date    CHOL 171 05/31/2019    CHOL 150 08/21/2018    CHOL 210 (H) 06/22/2018     Lab Results   Component Value Date    TRIG 63 05/31/2019    TRIG 94 08/21/2018    TRIG 503 (H) 06/22/2018     Lab Results   Component Value Date    HDL 71 (H) 05/31/2019    HDL 60 08/21/2018    HDL 58 (L) 06/22/2018     Lab Results   Component Value Date    LDL 87 05/31/2019    LDL 71 08/21/2018    LDL  06/22/2018      Comment:      Unable to calculate     Gastroesophageal reflux disease-currently controlled with diet    Hypothyroidism-stable with Synthroid 100 mcg daily  Lab Results   Component Value Date    TSH 3.670 05/31/2019     Generalized anxiety disorder/chronic depression- not at goal with Vistaril.  She states that she had some relief with BuSpar 10 mg in the past but it was not sufficient to control her anxiety symptoms.  She denies any SI or HI.    Postmenopausal- stable with conservative treatment for hot flashes    Insomnia- not at goal with Vistaril      HEALTH RISK ASSESSMENT    Recent Hospitalizations:  No hospitalization(s) within the last year.    Current Medical Providers:  Patient Care Team:  Paulette Wallis PA as PCP - General (Family Medicine)    Smoking Status:  Social History     Tobacco Use   Smoking Status Former Smoker   Smokeless Tobacco Never Used       Alcohol Consumption:  Social History     Substance and Sexual Activity   Alcohol Use No       Depression Screen:   PHQ-2/PHQ-9 Depression Screening 12/16/2019   Little interest or pleasure in doing things 0   Feeling down, depressed, or hopeless 0    Total Score 0       Fall Risk Screen:  JAMIE Fall Risk Assessment was completed, and patient is at LOW risk for falls.Assessment completed on:12/16/2019    Health Habits and Functional and Cognitive Screening:  Functional & Cognitive Status 12/16/2019   Do you have difficulty preparing food and eating? -   Do you have difficulty bathing yourself, getting dressed or grooming yourself? -   Do you have difficulty using the toilet? -   Do you have difficulty moving around from place to place? -   Do you have trouble with steps or getting out of a bed or a chair? -   Current Diet -   Dental Exam -   Eye Exam -   Exercise (times per week) -   Current Exercise Activities Include -   Do you need help using the phone?  -   Are you deaf or do you have serious difficulty hearing?  -   Do you need help with transportation? Yes   Do you need help shopping? -   Do you need help preparing meals?  -   Do you need help with housework?  -   Do you need help with laundry? -   Do you need help taking your medications? -   Do you need help managing money? -   Do you ever drive or ride in a car without wearing a seat belt? -   Have you felt unusual stress, anger or loneliness in the last month? -   Who do you live with? -   If you need help, do you have trouble finding someone available to you? -   Have you been bothered in the last four weeks by sexual problems? -   Do you have difficulty concentrating, remembering or making decisions? -         Does the patient have evidence of cognitive impairment? No    Asprin use counseling:Does not need ASA (and currently is not on it)    Age-appropriate Screening Schedule:  Refer to the list below for future screening recommendations based on patient's age, sex and/or medical conditions. Orders for these recommended tests are listed in the plan section. The patient has been provided with a written plan.    Health Maintenance   Topic Date Due   • ZOSTER VACCINE (2 of 2) 04/01/2016   •  PNEUMOCOCCAL VACCINES (65+ LOW/MEDIUM RISK) (2 of 2 - PPSV23) 11/21/2019   • MAMMOGRAM  05/10/2020   • LIPID PANEL  05/31/2020   • DXA SCAN  01/16/2021   • COLONOSCOPY  02/09/2022   • TDAP/TD VACCINES (2 - Td) 02/09/2027   • INFLUENZA VACCINE  Completed          The following portions of the patient's history were reviewed and updated as appropriate: allergies, current medications, past family history, past medical history, past social history, past surgical history and problem list.    Outpatient Medications Prior to Visit   Medication Sig Dispense Refill   • fenofibrate (TRICOR) 145 MG tablet Take 1 tablet by mouth Daily. 90 tablet 3   • hydrOXYzine pamoate (VISTARIL) 50 MG capsule Take 1 capsule by mouth 3 (Three) Times a Day As Needed for Anxiety. 270 capsule 3   • levothyroxine (SYNTHROID, LEVOTHROID) 100 MCG tablet Take 1 tablet by mouth Daily. 90 tablet 3   • losartan (COZAAR) 100 MG tablet Take 1 tablet by mouth Daily. 90 tablet 3   • mupirocin (BACTROBAN) 2 % ointment Apply  topically to the appropriate area as directed 3 (Three) Times a Day. 30 g 5   • rosuvastatin (CRESTOR) 20 MG tablet Take 1 tablet by mouth Every Night. 90 tablet 3   • triamcinolone (KENALOG) 0.1 % ointment Apply  topically to the appropriate area as directed 2 (Two) Times a Day. 80 g 5   • fluconazole (DIFLUCAN) 150 MG tablet Take 1 tablet by mouth Daily. 10 tablet 1   • nystatin (MYCOSTATIN) 954741 UNIT/GM powder Apply  topically to the appropriate area as directed 4 (Four) Times a Day. 60 g 5     No facility-administered medications prior to visit.        Patient Active Problem List   Diagnosis   • Hypothyroidism due to acquired atrophy of thyroid   • Hyperlipidemia   • Benign essential hypertension   • Gastroesophageal reflux disease without esophagitis   • Generalized anxiety disorder   • Primary insomnia   • Menopausal and postmenopausal disorder   • Major depression, chronic   • Family history of colon cancer   • Family history  "of polyps in the colon       Advanced Care Planning:  Patient does not have an advance directive - information provided to the patient today    Review of Systems   Constitutional: Negative for activity change, appetite change, fatigue and fever.   HENT: Negative for ear pain, sinus pressure and sore throat.    Eyes: Negative for pain and visual disturbance.   Respiratory: Negative for cough and chest tightness.    Cardiovascular: Negative for chest pain and palpitations.   Gastrointestinal: Negative for abdominal pain, constipation, diarrhea, nausea and vomiting.   Endocrine: Negative for polydipsia and polyuria.   Genitourinary: Negative for dysuria and frequency.   Musculoskeletal: Negative for back pain and myalgias.   Skin: Negative for color change and rash.   Allergic/Immunologic: Negative for food allergies and immunocompromised state.   Neurological: Negative for dizziness, syncope and headaches.   Hematological: Negative for adenopathy. Does not bruise/bleed easily.   Psychiatric/Behavioral: Positive for dysphoric mood and sleep disturbance. Negative for hallucinations and suicidal ideas. The patient is nervous/anxious.        Compared to one year ago, the patient feels her physical health is the same.  Compared to one year ago, the patient feels her mental health is worse.    Reviewed chart for potential of high risk medication in the elderly: yes  Reviewed chart for potential of harmful drug interactions in the elderly:yes    Objective         Vitals:    12/16/19 1149   BP: 136/80   Pulse: 75   Temp: 98.5 °F (36.9 °C)   TempSrc: Oral   SpO2: 97%   Weight: 85.7 kg (189 lb)   Height: 165.1 cm (65\")   PainSc: 0-No pain       Body mass index is 31.45 kg/m².  Discussed the patient's BMI with her. The BMI is above average; BMI management plan is completed.    Physical Exam   Constitutional: She is oriented to person, place, and time. She appears well-developed and well-nourished.   HENT:   Head: Normocephalic " and atraumatic.   Nose: Nose normal.   Mouth/Throat: Oropharynx is clear and moist.   Eyes: Pupils are equal, round, and reactive to light. Conjunctivae and EOM are normal.   Neck: Normal range of motion. Neck supple. No tracheal deviation present. No thyromegaly present.   Cardiovascular: Normal rate, regular rhythm, normal heart sounds and intact distal pulses.   No murmur heard.  Pulmonary/Chest: Effort normal and breath sounds normal. No respiratory distress. She has no wheezes.   Abdominal: Soft. Bowel sounds are normal. There is no tenderness. There is no guarding.   Musculoskeletal: Normal range of motion. She exhibits no edema or tenderness.   Lymphadenopathy:     She has no cervical adenopathy.   Neurological: She is alert and oriented to person, place, and time.   Skin: Skin is warm and dry. No rash noted.   Psychiatric: She has a normal mood and affect. Her behavior is normal.   Nursing note and vitals reviewed.            Assessment/Plan   Medicare Risks and Personalized Health Plan  CMS Preventative Services Quick Reference  Advance Directive Discussion  Cardiovascular risk  Obesity/Overweight     The above risks/problems have been discussed with the patient.  Pertinent information has been shared with the patient in the After Visit Summary.  Follow up plans and orders are seen below in the Assessment/Plan Section.    Diagnoses and all orders for this visit:    1. Medicare annual wellness visit, subsequent (Primary)  Comments:  Performed and documented today    2. Benign essential hypertension  Comments:  Continue losartan    3. Mixed hyperlipidemia  Comments:  Continue Crestor and TriCor    4. Gastroesophageal reflux disease without esophagitis  Comments:  Continue dietary restrictions for heartburn and acid reflux avoidance    5. Hypothyroidism due to acquired atrophy of thyroid  Comments:  Continue Synthroid    6. Generalized anxiety disorder  Comments:  Use vistaril only prn  increase buspar 15mg  bid  Orders:  -     busPIRone (BUSPAR) 15 MG tablet; Take 1 tablet by mouth 3 (Three) Times a Day.  Dispense: 60 tablet; Refill: 5    7. Major depression, chronic  Comments:  start elavil qhs    8. Menopausal and postmenopausal disorder  Comments:  Continue to monitor    9. Primary insomnia  Comments:  start elavil qhs  Orders:  -     amitriptyline (ELAVIL) 50 MG tablet; Take 1 tablet by mouth See Admin Instructions. 1 tab qhs for sleep  Dispense: 30 tablet; Refill: 5    Other orders  -     Pneumococcal Conjugate Vaccine 13-Valent All; Future      Follow Up:  Return in about 3 weeks (around 1/6/2020) for Followup with Paulette.     An After Visit Summary and PPPS were given to the patient.

## 2019-12-16 NOTE — PATIENT INSTRUCTIONS
Medicare Wellness  Personal Prevention Plan of Service     Date of Office Visit:  2019  Encounter Provider:  EDD Vargas  Place of Service:  [facility]  Patient Name: June Melchor  :  1955    As part of the Medicare Wellness portion of your visit today, we are providing you with this personalized preventive plan of services (PPPS). This plan is based upon recommendations of the [place] Preventive Services Task Force (USPSTF) and the Advisory Committee on Immunization Practices (ACIP).    This lists the preventive care services that should be considered, and provides dates of when you are due. Items listed as completed are up-to-date and do not require any further intervention.    Health Maintenance   Topic Date Due   • ZOSTER VACCINE (2 of 2) 2016   • PNEUMOCOCCAL VACCINES (65+ LOW/MEDIUM RISK) (2 of 2 - PPSV23) 2019   • MAMMOGRAM  05/10/2020   • LIPID PANEL  2020   • MEDICARE ANNUAL WELLNESS  2020   • DXA SCAN  2021   • COLONOSCOPY  2022   • TDAP/TD VACCINES (2 - Td) 2027   • HEPATITIS C SCREENING  Completed   • INFLUENZA VACCINE  Completed       Orders Placed This Encounter   Procedures   • Pneumococcal Polysaccharide Vaccine 23-Valent Greater Than or Equal To 1yo Subcutaneous / IM       No follow-ups on file.          Advance Directive    Advance directives are legal documents that let you make choices ahead of time about your health care and medical treatment in case you become unable to communicate for yourself. Advance directives are a way for you to communicate your wishes to family, friends, and health care providers. This can help convey your decisions about end-of-life care if you become unable to communicate.  Discussing and writing advance directives should happen over time rather than all at once. Advance directives can be changed depending on your situation and what you want, even after you  have signed the advance directives.  If you do not have an advance directive, some states assign family decision makers to act on your behalf based on how closely you are related to them. Each state has its own laws regarding advance directives. You may want to check with your health care provider, , or state representative about the laws in your state. There are different types of advance directives, such as:  · Medical power of .  · Living will.  · Do not resuscitate (DNR) or do not attempt resuscitation (DNAR) order.  Health care proxy and medical power of   A health care proxy, also called a health care agent, is a person who is appointed to make medical decisions for you in cases in which you are unable to make the decisions yourself. Generally, people choose someone they know well and trust to represent their preferences. Make sure to ask this person for an agreement to act as your proxy. A proxy may have to exercise judgment in the event of a medical decision for which your wishes are not known.  A medical power of  is a legal document that names your health care proxy. Depending on the laws in your state, after the document is written, it may also need to be:  · Signed.  · Notarized.  · Dated.  · Copied.  · Witnessed.  · Incorporated into your medical record.  You may also want to appoint someone to manage your financial affairs in a situation in which you are unable to do so. This is called a durable power of  for finances. It is a separate legal document from the durable power of  for health care. You may choose the same person or someone different from your health care proxy to act as your agent in financial matters.  If you do not appoint a proxy, or if there is a concern that the proxy is not acting in your best interests, a court-appointed guardian may be designated to act on your behalf.  Living will  A living will is a set of instructions documenting  your wishes about medical care when you cannot express them yourself. Health care providers should keep a copy of your living will in your medical record. You may want to give a copy to family members or friends. To alert caregivers in case of an emergency, you can place a card in your wallet to let them know that you have a living will and where they can find it. A living will is used if you become:  · Terminally ill.  · Incapacitated.  · Unable to communicate or make decisions.  Items to consider in your living will include:  · The use or non-use of life-sustaining equipment, such as dialysis machines and breathing machines (ventilators).  · A DNR or DNAR order, which is the instruction not to use cardiopulmonary resuscitation (CPR) if breathing or heartbeat stops.  · The use or non-use of tube feeding.  · Withholding of food and fluids.  · Comfort (palliative) care when the goal becomes comfort rather than a cure.  · Organ and tissue donation.  A living will does not give instructions for distributing your money and property if you should pass away. It is recommended that you seek the advice of a  when writing a will. Decisions about taxes, beneficiaries, and asset distribution will be legally binding. This process can relieve your family and friends of any concerns surrounding disputes or questions that may come up about the distribution of your assets.  DNR or DNAR  A DNR or DNAR order is a request not to have CPR in the event that your heart stops beating or you stop breathing. If a DNR or DNAR order has not been made and shared, a health care provider will try to help any patient whose heart has stopped or who has stopped breathing. If you plan to have surgery, talk with your health care provider about how your DNR or DNAR order will be followed if problems occur.  Summary  · Advance directives are the legal documents that allow you to make choices ahead of time about your health care and medical  "treatment in case you become unable to communicate for yourself.  · The process of discussing and writing advance directives should happen over time. You can change the advance directives, even after you have signed them.  · Advance directives include DNR or DNAR orders, living gao, and designating an agent as your medical power of .  This information is not intended to replace advice given to you by your health care provider. Make sure you discuss any questions you have with your health care provider.  Document Released: 03/26/2009 Document Revised: 11/06/2017 Document Reviewed: 11/06/2017  [email or web address] Interactive Patient Education © 2019 [email or web address]      Carbohydrate Counting for Diabetes Mellitus, Adult    Carbohydrate counting is a method of keeping track of how many carbohydrates you eat. Eating carbohydrates naturally increases the amount of sugar (glucose) in the blood. Counting how many carbohydrates you eat helps keep your blood glucose within normal limits, which helps you manage your diabetes (diabetes mellitus).  It is important to know how many carbohydrates you can safely have in each meal. This is different for every person. A diet and nutrition specialist (registered dietitian) can help you make a meal plan and calculate how many carbohydrates you should have at each meal and snack.  Carbohydrates are found in the following foods:  · Grains, such as breads and cereals.  · Dried beans and soy products.  · Starchy vegetables, such as potatoes, peas, and corn.  · Fruit and fruit juices.  · Milk and yogurt.  · Sweets and snack foods, such as cake, cookies, candy, chips, and soft drinks.  How do I count carbohydrates?  There are two ways to count carbohydrates in food. You can use either of the methods or a combination of both.  Reading \"Nutrition Facts\" on packaged food  The \"Nutrition Facts\" list is included on the labels of almost all packaged foods and beverages in the [place] It " "includes:  · The serving size.  · Information about nutrients in each serving, including the grams (g) of carbohydrate per serving.  To use the “Nutrition Facts\":  · Decide how many servings you will have.  · Multiply the number of servings by the number of carbohydrates per serving.  · The resulting number is the total amount of carbohydrates that you will be having.  Learning standard serving sizes of other foods  When you eat carbohydrate foods that are not packaged or do not include \"Nutrition Facts\" on the label, you need to measure the servings in order to count the amount of carbohydrates:  · Measure the foods that you will eat with a food scale or measuring cup, if needed.  · Decide how many standard-size servings you will eat.  · Multiply the number of servings by 15. Most carbohydrate-rich foods have about 15 g of carbohydrates per serving.  ? For example, if you eat 8 oz (170 g) of strawberries, you will have eaten 2 servings and 30 g of carbohydrates (2 servings x 15 g = 30 g).  · For foods that have more than one food mixed, such as soups and casseroles, you must count the carbohydrates in each food that is included.  The following list contains standard serving sizes of common carbohydrate-rich foods. Each of these servings has about 15 g of carbohydrates:  · ½ hamburger bun or ½ English muffin.  · ½ oz (15 mL) syrup.  · ½ oz (14 g) jelly.  · 1 slice of bread.  · 1 six-inch tortilla.  · 3 oz (85 g) cooked rice or pasta.  · 4 oz (113 g) cooked dried beans.  · 4 oz (113 g) starchy vegetable, such as peas, corn, or potatoes.  · 4 oz (113 g) hot cereal.  · 4 oz (113 g) mashed potatoes or ¼ of a large baked potato.  · 4 oz (113 g) canned or frozen fruit.  · 4 oz (120 mL) fruit juice.  · 4-6 crackers.  · 6 chicken nuggets.  · 6 oz (170 g) unsweetened dry cereal.  · 6 oz (170 g) plain fat-free yogurt or yogurt sweetened with artificial sweeteners.  · 8 oz (240 mL) milk.  · 8 oz (170 g) fresh fruit or one " small piece of fruit.  · 24 oz (680 g) popped popcorn.  Example of carbohydrate counting  Sample meal  · 3 oz (85 g) chicken breast.  · 6 oz (170 g) brown rice.  · 4 oz (113 g) corn.  · 8 oz (240 mL) milk.  · 8 oz (170 g) strawberries with sugar-free whipped topping.  Carbohydrate calculation  1. Identify the foods that contain carbohydrates:  ? Rice.  ? Corn.  ? Milk.  ? Strawberries.  2. Calculate how many servings you have of each food:  ? 2 servings rice.  ? 1 serving corn.  ? 1 serving milk.  ? 1 serving strawberries.  3. Multiply each number of servings by 15 g:  ? 2 servings rice x 15 g = 30 g.  ? 1 serving corn x 15 g = 15 g.  ? 1 serving milk x 15 g = 15 g.  ? 1 serving strawberries x 15 g = 15 g.  4. Add together all of the amounts to find the total grams of carbohydrates eaten:  ? 30 g + 15 g + 15 g + 15 g = 75 g of carbohydrates total.  Summary  · Carbohydrate counting is a method of keeping track of how many carbohydrates you eat.  · Eating carbohydrates naturally increases the amount of sugar (glucose) in the blood.  · Counting how many carbohydrates you eat helps keep your blood glucose within normal limits, which helps you manage your diabetes.  · A diet and nutrition specialist (registered dietitian) can help you make a meal plan and calculate how many carbohydrates you should have at each meal and snack.  This information is not intended to replace advice given to you by your health care provider. Make sure you discuss any questions you have with your health care provider.  Document Released: 12/18/2006 Document Revised: 06/27/2018 Document Reviewed: 05/31/2017  [employer] Patient Education © 2019 [employer]      Fat and Cholesterol Restricted Eating Plan  Getting too much fat and cholesterol in your diet may cause health problems. Choosing the right foods helps keep your fat and cholesterol at normal levels. This can keep you from getting certain diseases.  Your doctor may recommend  "an eating plan that includes:  · Total fat: ______% or less of total calories a day.  · Saturated fat: ______% or less of total calories a day.  · Cholesterol: less than _________mg a day.  · Fiber: ______g a day.  What are tips for following this plan?  Meal planning  · At meals, divide your plate into four equal parts:  ? Fill one-half of your plate with vegetables and green salads.  ? Fill one-fourth of your plate with whole grains.  ? Fill one-fourth of your plate with low-fat (lean) protein foods.  · Eat fish that is high in omega-3 fats at least two times a week. This includes mackerel, tuna, sardines, and salmon.  · Eat foods that are high in fiber, such as whole grains, beans, apples, broccoli, carrots, peas, and barley.  General tips    · Work with your doctor to lose weight if you need to.  · Avoid:  ? Foods with added sugar.  ? Fried foods.  ? Foods with partially hydrogenated oils.  · Limit alcohol intake to no more than 1 drink a day for nonpregnant women and 2 drinks a day for men. One drink equals 12 oz of beer, 5 oz of wine, or 1½ oz of hard liquor.  Reading food labels  · Check food labels for:  ? Trans fats.  ? Partially hydrogenated oils.  ? Saturated fat (g) in each serving.  ? Cholesterol (mg) in each serving.  ? Fiber (g) in each serving.  · Choose foods with healthy fats, such as:  ? Monounsaturated fats.  ? Polyunsaturated fats.  ? Omega-3 fats.  · Choose grain products that have whole grains. Look for the word \"whole\" as the first word in the ingredient list.  Cooking  · Cook foods using low-fat methods. These include baking, boiling, grilling, and broiling.  · Eat more home-cooked foods. Eat at restaurants and buffets less often.  · Avoid cooking using saturated fats, such as butter, cream, palm oil, palm kernel oil, and coconut oil.  Recommended foods    Fruits  · All fresh, canned (in natural juice), or frozen fruits.  Vegetables  · Fresh or frozen vegetables (raw, steamed, roasted, or " grilled). Green salads.  Grains  · Whole grains, such as whole wheat or whole grain breads, crackers, cereals, and pasta. Unsweetened oatmeal, bulgur, barley, quinoa, or brown rice. Corn or whole wheat flour tortillas.  Meats and other protein foods  · Ground beef (85% or leaner), grass-fed beef, or beef trimmed of fat. Skinless chicken or turkey. Ground chicken or turkey. Pork trimmed of fat. All fish and seafood. Egg whites. Dried beans, peas, or lentils. Unsalted nuts or seeds. Unsalted canned beans. Nut butters without added sugar or oil.  Dairy  · Low-fat or nonfat dairy products, such as skim or 1% milk, 2% or reduced-fat cheeses, low-fat and fat-free ricotta or cottage cheese, or plain low-fat and nonfat yogurt.  Fats and oils  · Tub margarine without trans fats. Light or reduced-fat mayonnaise and salad dressings. Avocado. Olive, canola, sesame, or safflower oils.  The items listed above may not be a complete list of foods and beverages you can eat. Contact a dietitian for more information.  Foods to avoid  Fruits  · Canned fruit in heavy syrup. Fruit in cream or butter sauce. Fried fruit.  Vegetables  · Vegetables cooked in cheese, cream, or butter sauce. Fried vegetables.  Grains  · White bread. White pasta. White rice. Cornbread. Bagels, pastries, and croissants. Crackers and snack foods that contain trans fat and hydrogenated oils.  Meats and other protein foods  · Fatty cuts of meat. Ribs, chicken wings, morfin, sausage, bologna, salami, chitterlings, fatback, hot dogs, bratwurst, and packaged lunch meats. Liver and organ meats. Whole eggs and egg yolks. Chicken and turkey with skin. Fried meat.  Dairy  · Whole or 2% milk, cream, half-and-half, and cream cheese. Whole milk cheeses. Whole-fat or sweetened yogurt. Full-fat cheeses. Nondairy creamers and whipped toppings. Processed cheese, cheese spreads, and cheese curds.  Beverages  · Alcohol. Sugar-sweetened drinks such as sodas, lemonade, and fruit  drinks.  Fats and oils  · Butter, stick margarine, lard, shortening, ghee, or morfin fat. Coconut, palm kernel, and palm oils.  Sweets and desserts  · Corn syrup, sugars, honey, and molasses. Candy. Jam and jelly. Syrup. Sweetened cereals. Cookies, pies, cakes, donuts, muffins, and ice cream.  The items listed above may not be a complete list of foods and beverages you should avoid. Contact a dietitian for more information.  Summary  · Choosing the right foods helps keep your fat and cholesterol at normal levels. This can keep you from getting certain diseases.  · At meals, fill one-half of your plate with vegetables and green salads.  · Eat high-fiber foods, like whole grains, beans, apples, carrots, peas, and barley.  · Limit added sugar, saturated fats, alcohol, and fried foods.  This information is not intended to replace advice given to you by your health care provider. Make sure you discuss any questions you have with your health care provider.  Document Released: 06/18/2013 Document Revised: 08/21/2019 Document Reviewed: 09/04/2018  [employer] Interactive Patient Education © 2019 [employer]

## 2019-12-18 ENCOUNTER — TELEPHONE (OUTPATIENT)
Dept: FAMILY MEDICINE CLINIC | Facility: CLINIC | Age: 64
End: 2019-12-18

## 2019-12-18 NOTE — TELEPHONE ENCOUNTER
I called genople informed her she could stop by anytime to get pneumonia 13 shot and she said   venkata next month with claudy and she will get it than.                            ----- Message from EDD Vargas sent at 12/16/2019  4:51 PM EST -----   Ms. Melchor left prior to getting her pneumonia 13 vaccination when she was here today 12/16/2019.  She is welcome to come in anytime to get this vaccination.

## 2019-12-23 ENCOUNTER — CLINICAL SUPPORT (OUTPATIENT)
Dept: FAMILY MEDICINE CLINIC | Facility: CLINIC | Age: 64
End: 2019-12-23

## 2019-12-23 DIAGNOSIS — Z00.00 ENCOUNTER FOR PREVENTIVE CARE: Primary | ICD-10-CM

## 2020-01-06 ENCOUNTER — OFFICE VISIT (OUTPATIENT)
Dept: FAMILY MEDICINE CLINIC | Facility: CLINIC | Age: 65
End: 2020-01-06

## 2020-01-06 VITALS
OXYGEN SATURATION: 96 % | DIASTOLIC BLOOD PRESSURE: 76 MMHG | SYSTOLIC BLOOD PRESSURE: 126 MMHG | HEIGHT: 65 IN | BODY MASS INDEX: 31.65 KG/M2 | HEART RATE: 86 BPM | TEMPERATURE: 98.3 F | WEIGHT: 190 LBS

## 2020-01-06 DIAGNOSIS — Z13.1 SCREENING FOR DIABETES MELLITUS: ICD-10-CM

## 2020-01-06 DIAGNOSIS — I10 BENIGN ESSENTIAL HYPERTENSION: ICD-10-CM

## 2020-01-06 DIAGNOSIS — E03.4 HYPOTHYROIDISM DUE TO ACQUIRED ATROPHY OF THYROID: ICD-10-CM

## 2020-01-06 DIAGNOSIS — E78.2 MIXED HYPERLIPIDEMIA: ICD-10-CM

## 2020-01-06 DIAGNOSIS — B37.2 CANDIDIASIS, INTERTRIGINOUS: ICD-10-CM

## 2020-01-06 DIAGNOSIS — F51.01 PRIMARY INSOMNIA: Primary | ICD-10-CM

## 2020-01-06 DIAGNOSIS — F41.1 GENERALIZED ANXIETY DISORDER: ICD-10-CM

## 2020-01-06 PROCEDURE — 99214 OFFICE O/P EST MOD 30 MIN: CPT | Performed by: PHYSICIAN ASSISTANT

## 2020-01-06 RX ORDER — FLUCONAZOLE 150 MG/1
150 TABLET ORAL DAILY
Qty: 10 TABLET | Refills: 0 | Status: SHIPPED | OUTPATIENT
Start: 2020-01-06 | End: 2020-03-17

## 2020-01-06 RX ORDER — BUSPIRONE HYDROCHLORIDE 15 MG/1
15 TABLET ORAL 3 TIMES DAILY
Qty: 180 TABLET | Refills: 3 | Status: SHIPPED | OUTPATIENT
Start: 2020-01-06 | End: 2020-02-14

## 2020-01-06 RX ORDER — AMITRIPTYLINE HYDROCHLORIDE 50 MG/1
50 TABLET, FILM COATED ORAL SEE ADMIN INSTRUCTIONS
Qty: 90 TABLET | Refills: 3 | Status: SHIPPED | OUTPATIENT
Start: 2020-01-06 | End: 2020-07-06 | Stop reason: SDUPTHER

## 2020-01-06 NOTE — PROGRESS NOTES
Subjective   Genople Jill is a 64 y.o. female.       Chief Complaint -insomnia       History of Present Illness -      Insomnia-  She complains of insomnia.  Sleep quality is much better with the use of amitriptyline that she started last month.  She denies any unwanted side effects of amitriptyline    Anxiety-  Stable with BuSpar and Vistaril    Rash-  She complains of a rash in her intertriginous areas and under breasts.  Onset 3 weeks.  Minimal relief with nystatin powder.    Hypertension-controlled with losartan    Hyperlipidemia-stable with Crestor  Lab Results   Component Value Date    CHOL 171 05/31/2019    CHOL 150 08/21/2018    CHOL 210 (H) 06/22/2018     Lab Results   Component Value Date    TRIG 63 05/31/2019    TRIG 94 08/21/2018    TRIG 503 (H) 06/22/2018     Lab Results   Component Value Date    HDL 71 (H) 05/31/2019    HDL 60 08/21/2018    HDL 58 (L) 06/22/2018     Lab Results   Component Value Date    LDL 87 05/31/2019    LDL 71 08/21/2018    LDL  06/22/2018      Comment:      Unable to calculate     Hypothyroidism-stable with Synthroid 100 mcg daily  Lab Results   Component Value Date    TSH 3.670 05/31/2019         The following portions of the patient's history were reviewed and updated as appropriate: allergies, current medications, past family history, past medical history, past social history, past surgical history and problem list.    Review of Systems   Constitutional: Negative for activity change, appetite change, fatigue and fever.   HENT: Negative for ear pain, sinus pressure and sore throat.    Eyes: Negative for pain and visual disturbance.   Respiratory: Negative for cough and chest tightness.    Cardiovascular: Negative for chest pain and palpitations.   Gastrointestinal: Negative for abdominal pain, constipation, diarrhea, nausea and vomiting.   Endocrine: Negative for polydipsia and polyuria.   Genitourinary: Negative for dysuria and frequency.   Musculoskeletal: Negative for back  "pain and myalgias.   Skin: Positive for rash. Negative for color change.   Allergic/Immunologic: Negative for food allergies and immunocompromised state.   Neurological: Negative for dizziness, syncope and headaches.   Hematological: Negative for adenopathy. Does not bruise/bleed easily.   Psychiatric/Behavioral: Positive for sleep disturbance. Negative for hallucinations and suicidal ideas. The patient is not nervous/anxious.        /76   Pulse 86   Temp 98.3 °F (36.8 °C) (Oral)   Ht 165.1 cm (65\")   Wt 86.2 kg (190 lb)   SpO2 96%   BMI 31.62 kg/m²   Lab on 05/31/2019   Component Date Value Ref Range Status   • Hepatitis C Ab 05/31/2019 Non-Reactive  Non-Reactive Final   • TSH 05/31/2019 3.670  0.270 - 4.200 mIU/mL Final   • Glucose 05/31/2019 87  65 - 99 mg/dL Final   • BUN 05/31/2019 18  8 - 23 mg/dL Final   • Creatinine 05/31/2019 1.10* 0.57 - 1.00 mg/dL Final   • Sodium 05/31/2019 144  136 - 145 mmol/L Final   • Potassium 05/31/2019 4.0  3.5 - 5.2 mmol/L Final   • Chloride 05/31/2019 106  98 - 107 mmol/L Final   • CO2 05/31/2019 26.8  22.0 - 29.0 mmol/L Final   • Calcium 05/31/2019 9.6  8.6 - 10.5 mg/dL Final   • Total Protein 05/31/2019 7.2  6.0 - 8.5 g/dL Final   • Albumin 05/31/2019 4.40  3.50 - 5.20 g/dL Final   • ALT (SGPT) 05/31/2019 19  1 - 33 U/L Final   • AST (SGOT) 05/31/2019 23  1 - 32 U/L Final   • Alkaline Phosphatase 05/31/2019 67  39 - 117 U/L Final   • Total Bilirubin 05/31/2019 0.4  0.2 - 1.2 mg/dL Final   • eGFR Non African Amer 05/31/2019 50* >60 mL/min/1.73 Final   • Globulin 05/31/2019 2.8  gm/dL Final   • A/G Ratio 05/31/2019 1.6  g/dL Final   • BUN/Creatinine Ratio 05/31/2019 16.4  7.0 - 25.0 Final   • Anion Gap 05/31/2019 11.2  mmol/L Final   • Total Cholesterol 05/31/2019 171  0 - 200 mg/dL Final   • Triglycerides 05/31/2019 63  0 - 150 mg/dL Final   • HDL Cholesterol 05/31/2019 71* 40 - 60 mg/dL Final   • LDL Cholesterol  05/31/2019 87  0 - 100 mg/dL Final   • VLDL " Cholesterol 05/31/2019 12.6  5 - 40 mg/dL Final   • LDL/HDL Ratio 05/31/2019 1.23   Final   • Free T4 05/31/2019 1.47  0.93 - 1.70 ng/dL Final   • Hemoglobin A1C 05/31/2019 5.20  4.80 - 5.60 % Final       Physical Exam   Constitutional: She is oriented to person, place, and time. She appears well-developed and well-nourished.   HENT:   Head: Normocephalic and atraumatic.   Nose: Nose normal.   Mouth/Throat: Oropharynx is clear and moist.   Eyes: Pupils are equal, round, and reactive to light. Conjunctivae and EOM are normal.   Neck: Normal range of motion. Neck supple. No tracheal deviation present. No thyromegaly present.   Cardiovascular: Normal rate, regular rhythm, normal heart sounds and intact distal pulses.   No murmur heard.  Pulmonary/Chest: Effort normal and breath sounds normal. No respiratory distress. She has no wheezes.   Abdominal: Soft. Bowel sounds are normal. There is no tenderness. There is no guarding.   Musculoskeletal: Normal range of motion. She exhibits no edema or tenderness.   Lymphadenopathy:     She has no cervical adenopathy.   Neurological: She is alert and oriented to person, place, and time.   Skin: Skin is warm and dry. Rash noted.   Erythematous moist localized rash noted under breasts bilaterally   Psychiatric: She has a normal mood and affect. Her behavior is normal.   Nursing note and vitals reviewed.      Assessment/Plan     Diagnoses and all orders for this visit:    Primary insomnia  -     amitriptyline (ELAVIL) 50 MG tablet; Take 1 tablet by mouth See Admin Instructions. 1 tab qhs for sleep    Generalized anxiety disorder  Comments:  Use vistaril only prn  continue buspar 15mg  Orders:  -     busPIRone (BUSPAR) 15 MG tablet; Take 1 tablet by mouth 3 (Three) Times a Day.    Candidiasis, intertriginous  -     fluconazole (DIFLUCAN) 150 MG tablet; Take 1 tablet by mouth Daily.    Benign essential hypertension  -     Lipid Panel; Future  -     Comprehensive Metabolic Panel;  Future    Mixed hyperlipidemia  -     Lipid Panel; Future  -     Comprehensive Metabolic Panel; Future    Hypothyroidism due to acquired atrophy of thyroid  -     T4, Free; Future  -     TSH; Future    Screening for diabetes mellitus  -     Comprehensive Metabolic Panel; Future                 This document has been electronically signed by:  Paulette Wallis PA-C

## 2020-02-14 ENCOUNTER — OFFICE VISIT (OUTPATIENT)
Dept: FAMILY MEDICINE CLINIC | Facility: CLINIC | Age: 65
End: 2020-02-14

## 2020-02-14 VITALS
HEART RATE: 90 BPM | TEMPERATURE: 98.5 F | SYSTOLIC BLOOD PRESSURE: 134 MMHG | OXYGEN SATURATION: 96 % | HEIGHT: 65 IN | BODY MASS INDEX: 31.16 KG/M2 | WEIGHT: 187 LBS | DIASTOLIC BLOOD PRESSURE: 80 MMHG

## 2020-02-14 DIAGNOSIS — L30.1 DYSHIDROTIC ECZEMA: Primary | ICD-10-CM

## 2020-02-14 DIAGNOSIS — E03.4 HYPOTHYROIDISM DUE TO ACQUIRED ATROPHY OF THYROID: ICD-10-CM

## 2020-02-14 DIAGNOSIS — I10 BENIGN ESSENTIAL HYPERTENSION: ICD-10-CM

## 2020-02-14 DIAGNOSIS — E78.2 MIXED HYPERLIPIDEMIA: ICD-10-CM

## 2020-02-14 PROCEDURE — 99214 OFFICE O/P EST MOD 30 MIN: CPT | Performed by: PHYSICIAN ASSISTANT

## 2020-02-16 NOTE — PROGRESS NOTES
Subjective   Genople Jill is a 64 y.o. female.       Chief Complaint -rash    History of Present Illness -      Rash on hands-  She complains of rash on bilateral hands with erythema and intermittent cracking and bleeding that is worse in the winter.  Onset greater than 1 year.  Some relief with triamcinolone cream    Hypertension-stable with losartan    Hyperlipidemia-stable with Crestor and low-cholesterol diet  Lab Results   Component Value Date    CHOL 171 05/31/2019    CHOL 150 08/21/2018    CHOL 210 (H) 06/22/2018     Lab Results   Component Value Date    TRIG 63 05/31/2019    TRIG 94 08/21/2018    TRIG 503 (H) 06/22/2018     Lab Results   Component Value Date    HDL 71 (H) 05/31/2019    HDL 60 08/21/2018    HDL 58 (L) 06/22/2018     Lab Results   Component Value Date    LDL 87 05/31/2019    LDL 71 08/21/2018    LDL  06/22/2018      Comment:      Unable to calculate     Hypothyroidism-stable with Synthroid 100 mcg daily  Lab Results   Component Value Date    TSH 3.670 05/31/2019         The following portions of the patient's history were reviewed and updated as appropriate: allergies, current medications, past family history, past medical history, past social history, past surgical history and problem list.    Review of Systems   Constitutional: Negative for activity change, appetite change, fatigue and fever.   HENT: Negative for ear pain, sinus pressure and sore throat.    Eyes: Negative for pain and visual disturbance.   Respiratory: Negative for cough and chest tightness.    Cardiovascular: Negative for chest pain and palpitations.   Gastrointestinal: Negative for abdominal pain, constipation, diarrhea, nausea and vomiting.   Endocrine: Negative for polydipsia and polyuria.   Genitourinary: Negative for dysuria and frequency.   Musculoskeletal: Negative for back pain and myalgias.   Skin: Positive for rash. Negative for color change.   Allergic/Immunologic: Negative for food allergies and  "immunocompromised state.   Neurological: Negative for dizziness, syncope and headaches.   Hematological: Negative for adenopathy. Does not bruise/bleed easily.   Psychiatric/Behavioral: Negative for hallucinations and suicidal ideas. The patient is not nervous/anxious.        /80   Pulse 90   Temp 98.5 °F (36.9 °C) (Oral)   Ht 165.1 cm (65\")   Wt 84.8 kg (187 lb)   SpO2 96%   BMI 31.12 kg/m²   Lab on 05/31/2019   Component Date Value Ref Range Status   • Hepatitis C Ab 05/31/2019 Non-Reactive  Non-Reactive Final   • TSH 05/31/2019 3.670  0.270 - 4.200 mIU/mL Final   • Glucose 05/31/2019 87  65 - 99 mg/dL Final   • BUN 05/31/2019 18  8 - 23 mg/dL Final   • Creatinine 05/31/2019 1.10* 0.57 - 1.00 mg/dL Final   • Sodium 05/31/2019 144  136 - 145 mmol/L Final   • Potassium 05/31/2019 4.0  3.5 - 5.2 mmol/L Final   • Chloride 05/31/2019 106  98 - 107 mmol/L Final   • CO2 05/31/2019 26.8  22.0 - 29.0 mmol/L Final   • Calcium 05/31/2019 9.6  8.6 - 10.5 mg/dL Final   • Total Protein 05/31/2019 7.2  6.0 - 8.5 g/dL Final   • Albumin 05/31/2019 4.40  3.50 - 5.20 g/dL Final   • ALT (SGPT) 05/31/2019 19  1 - 33 U/L Final   • AST (SGOT) 05/31/2019 23  1 - 32 U/L Final   • Alkaline Phosphatase 05/31/2019 67  39 - 117 U/L Final   • Total Bilirubin 05/31/2019 0.4  0.2 - 1.2 mg/dL Final   • eGFR Non African Amer 05/31/2019 50* >60 mL/min/1.73 Final   • Globulin 05/31/2019 2.8  gm/dL Final   • A/G Ratio 05/31/2019 1.6  g/dL Final   • BUN/Creatinine Ratio 05/31/2019 16.4  7.0 - 25.0 Final   • Anion Gap 05/31/2019 11.2  mmol/L Final   • Total Cholesterol 05/31/2019 171  0 - 200 mg/dL Final   • Triglycerides 05/31/2019 63  0 - 150 mg/dL Final   • HDL Cholesterol 05/31/2019 71* 40 - 60 mg/dL Final   • LDL Cholesterol  05/31/2019 87  0 - 100 mg/dL Final   • VLDL Cholesterol 05/31/2019 12.6  5 - 40 mg/dL Final   • LDL/HDL Ratio 05/31/2019 1.23   Final   • Free T4 05/31/2019 1.47  0.93 - 1.70 ng/dL Final   • Hemoglobin A1C " 05/31/2019 5.20  4.80 - 5.60 % Final       Physical Exam   Constitutional: She is oriented to person, place, and time. She appears well-developed and well-nourished.   HENT:   Head: Normocephalic and atraumatic.   Nose: Nose normal.   Mouth/Throat: Oropharynx is clear and moist.   Eyes: Pupils are equal, round, and reactive to light. Conjunctivae and EOM are normal.   Neck: Normal range of motion. Neck supple. No tracheal deviation present. No thyromegaly present.   Cardiovascular: Normal rate, regular rhythm, normal heart sounds and intact distal pulses.   No murmur heard.  Pulmonary/Chest: Effort normal and breath sounds normal. No respiratory distress. She has no wheezes.   Abdominal: Soft. Bowel sounds are normal. There is no tenderness. There is no guarding.   Musculoskeletal: Normal range of motion. She exhibits no edema or tenderness.   Lymphadenopathy:     She has no cervical adenopathy.   Neurological: She is alert and oriented to person, place, and time.   Skin: Skin is warm and dry. Rash noted. There is erythema.   Palms of hands noted to be erythematous with and cracking bilaterally   Psychiatric: She has a normal mood and affect. Her behavior is normal.   Nursing note and vitals reviewed.      Assessment/Plan     Diagnoses and all orders for this visit:    Dyshidrotic eczema  Comments:  Hold triamcinolone  Start Eucrisa-samples given to the patient today for trial  Orders:  -     Crisaborole (EUCRISA) 2 % ointment; Apply 5 g topically Daily.    Benign essential hypertension  Comments:  Continue losartan    Mixed hyperlipidemia  Comments:  Continue Crestor and advised low-cholesterol diet    Hypothyroidism due to acquired atrophy of thyroid  Comments:  Continue Synthroid 100 mcg daily            This document has been electronically signed by:  Paulette Wallis PA-C

## 2020-02-20 RX ORDER — HYDROXYZINE 50 MG/1
TABLET, FILM COATED ORAL
Qty: 90 TABLET | Refills: 11 | Status: SHIPPED | OUTPATIENT
Start: 2020-02-20 | End: 2020-07-06 | Stop reason: SDUPTHER

## 2020-03-17 DIAGNOSIS — B37.2 CANDIDIASIS, INTERTRIGINOUS: ICD-10-CM

## 2020-03-17 RX ORDER — FLUCONAZOLE 150 MG/1
TABLET ORAL
Qty: 1 TABLET | Refills: 0 | Status: SHIPPED | OUTPATIENT
Start: 2020-03-17 | End: 2020-07-06

## 2020-05-18 DIAGNOSIS — E78.2 MIXED HYPERLIPIDEMIA: ICD-10-CM

## 2020-05-18 RX ORDER — ROSUVASTATIN CALCIUM 20 MG/1
TABLET, COATED ORAL
Qty: 90 TABLET | Refills: 3 | Status: SHIPPED | OUTPATIENT
Start: 2020-05-18 | End: 2020-07-06 | Stop reason: SDUPTHER

## 2020-05-21 RX ORDER — TRIAMCINOLONE ACETONIDE 1 MG/G
CREAM TOPICAL
Qty: 80 G | Refills: 0 | Status: SHIPPED | OUTPATIENT
Start: 2020-05-21 | End: 2020-07-06

## 2020-06-17 DIAGNOSIS — I10 BENIGN ESSENTIAL HYPERTENSION: ICD-10-CM

## 2020-06-18 RX ORDER — LOSARTAN POTASSIUM 100 MG/1
TABLET ORAL
Qty: 90 TABLET | Refills: 3 | Status: SHIPPED | OUTPATIENT
Start: 2020-06-18 | End: 2020-07-06 | Stop reason: SDUPTHER

## 2020-06-29 ENCOUNTER — LAB (OUTPATIENT)
Dept: FAMILY MEDICINE CLINIC | Facility: CLINIC | Age: 65
End: 2020-06-29

## 2020-06-29 DIAGNOSIS — I10 BENIGN ESSENTIAL HYPERTENSION: ICD-10-CM

## 2020-06-29 DIAGNOSIS — E03.4 HYPOTHYROIDISM DUE TO ACQUIRED ATROPHY OF THYROID: ICD-10-CM

## 2020-06-29 DIAGNOSIS — E78.2 MIXED HYPERLIPIDEMIA: ICD-10-CM

## 2020-06-29 DIAGNOSIS — Z13.1 SCREENING FOR DIABETES MELLITUS: ICD-10-CM

## 2020-06-29 LAB
ALBUMIN SERPL-MCNC: 4.6 G/DL (ref 3.5–5.2)
ALBUMIN/GLOB SERPL: 1.6 G/DL
ALP SERPL-CCNC: 65 U/L (ref 39–117)
ALT SERPL W P-5'-P-CCNC: 24 U/L (ref 1–33)
ANION GAP SERPL CALCULATED.3IONS-SCNC: 10.4 MMOL/L (ref 5–15)
AST SERPL-CCNC: 25 U/L (ref 1–32)
BILIRUB SERPL-MCNC: 0.4 MG/DL (ref 0.2–1.2)
BUN SERPL-MCNC: 22 MG/DL (ref 8–23)
BUN/CREAT SERPL: 19.3 (ref 7–25)
CALCIUM SPEC-SCNC: 9.6 MG/DL (ref 8.6–10.5)
CHLORIDE SERPL-SCNC: 102 MMOL/L (ref 98–107)
CHOLEST SERPL-MCNC: 162 MG/DL (ref 0–200)
CO2 SERPL-SCNC: 26.6 MMOL/L (ref 22–29)
CREAT SERPL-MCNC: 1.14 MG/DL (ref 0.57–1)
GFR SERPL CREATININE-BSD FRML MDRD: 48 ML/MIN/1.73
GLOBULIN UR ELPH-MCNC: 2.8 GM/DL
GLUCOSE SERPL-MCNC: 93 MG/DL (ref 65–99)
HDLC SERPL-MCNC: 67 MG/DL (ref 40–60)
LDLC SERPL CALC-MCNC: 79 MG/DL (ref 0–100)
LDLC/HDLC SERPL: 1.18 {RATIO}
POTASSIUM SERPL-SCNC: 4.5 MMOL/L (ref 3.5–5.2)
PROT SERPL-MCNC: 7.4 G/DL (ref 6–8.5)
SODIUM SERPL-SCNC: 139 MMOL/L (ref 136–145)
T4 FREE SERPL-MCNC: 1.06 NG/DL (ref 0.93–1.7)
TRIGL SERPL-MCNC: 79 MG/DL (ref 0–150)
TSH SERPL DL<=0.05 MIU/L-ACNC: 13.6 UIU/ML (ref 0.27–4.2)
VLDLC SERPL-MCNC: 15.8 MG/DL

## 2020-06-29 PROCEDURE — 84443 ASSAY THYROID STIM HORMONE: CPT | Performed by: PHYSICIAN ASSISTANT

## 2020-06-29 PROCEDURE — 84439 ASSAY OF FREE THYROXINE: CPT | Performed by: PHYSICIAN ASSISTANT

## 2020-06-29 PROCEDURE — 80053 COMPREHEN METABOLIC PANEL: CPT | Performed by: PHYSICIAN ASSISTANT

## 2020-06-29 PROCEDURE — 80061 LIPID PANEL: CPT | Performed by: PHYSICIAN ASSISTANT

## 2020-07-06 ENCOUNTER — OFFICE VISIT (OUTPATIENT)
Dept: FAMILY MEDICINE CLINIC | Facility: CLINIC | Age: 65
End: 2020-07-06

## 2020-07-06 DIAGNOSIS — F51.01 PRIMARY INSOMNIA: ICD-10-CM

## 2020-07-06 DIAGNOSIS — E78.2 MIXED HYPERLIPIDEMIA: ICD-10-CM

## 2020-07-06 DIAGNOSIS — I10 BENIGN ESSENTIAL HYPERTENSION: ICD-10-CM

## 2020-07-06 DIAGNOSIS — E03.4 HYPOTHYROIDISM DUE TO ACQUIRED ATROPHY OF THYROID: Primary | ICD-10-CM

## 2020-07-06 DIAGNOSIS — F41.1 GAD (GENERALIZED ANXIETY DISORDER): ICD-10-CM

## 2020-07-06 DIAGNOSIS — L30.1 DYSHIDROTIC ECZEMA: ICD-10-CM

## 2020-07-06 PROCEDURE — G2025 DIS SITE TELE SVCS RHC/FQHC: HCPCS | Performed by: PHYSICIAN ASSISTANT

## 2020-07-06 RX ORDER — AMITRIPTYLINE HYDROCHLORIDE 50 MG/1
50 TABLET, FILM COATED ORAL SEE ADMIN INSTRUCTIONS
Qty: 90 TABLET | Refills: 3 | Status: SHIPPED | OUTPATIENT
Start: 2020-07-06 | End: 2020-11-03

## 2020-07-06 RX ORDER — FENOFIBRATE 145 MG/1
145 TABLET, COATED ORAL DAILY
Qty: 90 TABLET | Refills: 3 | Status: SHIPPED | OUTPATIENT
Start: 2020-07-06 | End: 2021-03-30 | Stop reason: SDUPTHER

## 2020-07-06 RX ORDER — LEVOTHYROXINE SODIUM 0.1 MG/1
100 TABLET ORAL DAILY
Qty: 90 TABLET | Refills: 3 | Status: SHIPPED | OUTPATIENT
Start: 2020-07-06 | End: 2021-03-30 | Stop reason: SDUPTHER

## 2020-07-06 RX ORDER — HYDROXYZINE 50 MG/1
50 TABLET, FILM COATED ORAL 3 TIMES DAILY PRN
Qty: 90 TABLET | Refills: 5 | Status: SHIPPED | OUTPATIENT
Start: 2020-07-06 | End: 2020-10-06

## 2020-07-06 RX ORDER — LOSARTAN POTASSIUM 100 MG/1
100 TABLET ORAL DAILY
Qty: 90 TABLET | Refills: 3 | Status: SHIPPED | OUTPATIENT
Start: 2020-07-06 | End: 2021-03-30 | Stop reason: SDUPTHER

## 2020-07-06 RX ORDER — ROSUVASTATIN CALCIUM 20 MG/1
20 TABLET, COATED ORAL NIGHTLY
Qty: 90 TABLET | Refills: 3 | Status: SHIPPED | OUTPATIENT
Start: 2020-07-06 | End: 2021-03-30 | Stop reason: SDUPTHER

## 2020-07-07 NOTE — PROGRESS NOTES
Kasandra Melchor is a 64 y.o. female.     Telephone Visit    You have chosen to receive care through a telephone visit. Do you consent to use a telephone visit for your medical care today? Yes    This visit has been rescheduled as a phone visit to comply with patient safety concerns in accordance with CDC recommendations. Total time of discussion was 11 minutes.    Chief Complaint -hypothyroidism    History of Present Illness -      Hypothyroidism-  Asymptomatic on Synthroid 100 mcg daily.  We did discuss her recent lab work which shows an elevated TSH.  She states that she is not having any symptoms and would like to stay on the same dose of Synthroid.    Insomnia- controlled with amitriptyline    Dyshidrotic eczema-  She states minimal relief with the use of Eucrisa.  She reports that at this time of year she feels that lotion alone is doing just as well as the triamcinolone or the Eucrisa.  Stable with lotion.    Hyperlipidemia-stable with TriCor  Lab Results   Component Value Date    CHOL 162 06/29/2020    CHOL 171 05/31/2019    CHOL 150 08/21/2018     Lab Results   Component Value Date    TRIG 79 06/29/2020    TRIG 63 05/31/2019    TRIG 94 08/21/2018     Lab Results   Component Value Date    HDL 67 (H) 06/29/2020    HDL 71 (H) 05/31/2019    HDL 60 08/21/2018     Lab Results   Component Value Date    LDL 79 06/29/2020    LDL 87 05/31/2019    LDL 71 08/21/2018     Hypertension- stable with losartan    Generalized anxiety disorder-stable with amitriptyline and hydroxyzine.  She denies any SI or HI.    The following portions of the patient's history were reviewed and updated as appropriate: allergies, current medications, past family history, past medical history, past social history, past surgical history and problem list.    Review of Systems   Constitutional: Negative for activity change, appetite change, fatigue and fever.   HENT: Negative for ear pain, sinus pressure and sore throat.    Eyes:  Negative for pain and visual disturbance.   Respiratory: Negative for cough and chest tightness.    Cardiovascular: Negative for chest pain and palpitations.   Gastrointestinal: Negative for abdominal pain, constipation, diarrhea, nausea and vomiting.   Endocrine: Negative for polydipsia and polyuria.   Genitourinary: Negative for dysuria and frequency.   Musculoskeletal: Negative for back pain and myalgias.   Skin: Negative for color change and rash.        Dry skin on hands   Allergic/Immunologic: Negative for food allergies and immunocompromised state.   Neurological: Negative for dizziness, syncope and headaches.   Hematological: Negative for adenopathy. Does not bruise/bleed easily.   Psychiatric/Behavioral: Negative for hallucinations and suicidal ideas. The patient is not nervous/anxious.        There were no vitals taken for this visit.  Lab on 06/29/2020   Component Date Value Ref Range Status   • Free T4 06/29/2020 1.06  0.93 - 1.70 ng/dL Final   • TSH 06/29/2020 13.600* 0.270 - 4.200 uIU/mL Final   • Total Cholesterol 06/29/2020 162  0 - 200 mg/dL Final   • Triglycerides 06/29/2020 79  0 - 150 mg/dL Final   • HDL Cholesterol 06/29/2020 67* 40 - 60 mg/dL Final   • LDL Cholesterol  06/29/2020 79  0 - 100 mg/dL Final   • VLDL Cholesterol 06/29/2020 15.8  mg/dL Final   • LDL/HDL Ratio 06/29/2020 1.18   Final   • Glucose 06/29/2020 93  65 - 99 mg/dL Final   • BUN 06/29/2020 22  8 - 23 mg/dL Final   • Creatinine 06/29/2020 1.14* 0.57 - 1.00 mg/dL Final   • Sodium 06/29/2020 139  136 - 145 mmol/L Final   • Potassium 06/29/2020 4.5  3.5 - 5.2 mmol/L Final   • Chloride 06/29/2020 102  98 - 107 mmol/L Final   • CO2 06/29/2020 26.6  22.0 - 29.0 mmol/L Final   • Calcium 06/29/2020 9.6  8.6 - 10.5 mg/dL Final   • Total Protein 06/29/2020 7.4  6.0 - 8.5 g/dL Final   • Albumin 06/29/2020 4.60  3.50 - 5.20 g/dL Final   • ALT (SGPT) 06/29/2020 24  1 - 33 U/L Final   • AST (SGOT) 06/29/2020 25  1 - 32 U/L Final   •  Alkaline Phosphatase 06/29/2020 65  39 - 117 U/L Final   • Total Bilirubin 06/29/2020 0.4  0.2 - 1.2 mg/dL Final   • eGFR Non African Amer 06/29/2020 48* >60 mL/min/1.73 Final   • Globulin 06/29/2020 2.8  gm/dL Final   • A/G Ratio 06/29/2020 1.6  g/dL Final   • BUN/Creatinine Ratio 06/29/2020 19.3  7.0 - 25.0 Final   • Anion Gap 06/29/2020 10.4  5.0 - 15.0 mmol/L Final       Physical Exam   Psychiatric: She has a normal mood and affect. Her behavior is normal. Thought content normal.       Assessment/Plan     Diagnoses and all orders for this visit:    Hypothyroidism due to acquired atrophy of thyroid  -     levothyroxine (SYNTHROID, LEVOTHROID) 100 MCG tablet; Take 1 tablet by mouth Daily.    Primary insomnia  -     amitriptyline (ELAVIL) 50 MG tablet; Take 1 tablet by mouth See Admin Instructions. 1 tab qhs for sleep    Dyshidrotic eczema  Comments:  Hold triamcinolone and Eucrisa  Continue lubrication with over-the-counter lotion      Mixed hyperlipidemia  Comments:  Advised low-cholesterol diet  Continue fenofibrate  Orders:  -     fenofibrate (Tricor) 145 MG tablet; Take 1 tablet by mouth Daily.  -     rosuvastatin (CRESTOR) 20 MG tablet; Take 1 tablet by mouth Every Night.    Benign essential hypertension  Comments:  Continue to monitor daily blood pressure and pulse   Continue losartan  Orders:  -     losartan (COZAAR) 100 MG tablet; Take 1 tablet by mouth Daily.    MARIANA (generalized anxiety disorder)  Comments:  Continue amitriptyline and hydroxyzine  Orders:  -     hydrOXYzine (ATARAX) 50 MG tablet; Take 1 tablet by mouth 3 (Three) Times a Day As Needed for Anxiety. for anxiety            This document has been electronically signed by:  Paulette Wallis PA-C

## 2020-07-07 NOTE — PATIENT INSTRUCTIONS

## 2020-07-10 ENCOUNTER — HOSPITAL ENCOUNTER (OUTPATIENT)
Dept: MAMMOGRAPHY | Facility: HOSPITAL | Age: 65
Discharge: HOME | End: 2020-07-10
Admitting: PHYSICIAN ASSISTANT

## 2020-07-10 DIAGNOSIS — Z12.31 VISIT FOR SCREENING MAMMOGRAM: ICD-10-CM

## 2020-07-10 PROCEDURE — 77063 BREAST TOMOSYNTHESIS BI: CPT | Performed by: RADIOLOGY

## 2020-07-10 PROCEDURE — 77067 SCR MAMMO BI INCL CAD: CPT | Performed by: RADIOLOGY

## 2020-07-10 PROCEDURE — 77067 SCR MAMMO BI INCL CAD: CPT

## 2020-07-10 PROCEDURE — 77063 BREAST TOMOSYNTHESIS BI: CPT

## 2020-07-20 ENCOUNTER — TELEPHONE (OUTPATIENT)
Dept: FAMILY MEDICINE CLINIC | Facility: CLINIC | Age: 65
End: 2020-07-20

## 2020-07-20 NOTE — TELEPHONE ENCOUNTER
----- Message from EDD Vargas sent at 7/19/2020  5:23 PM EDT -----  Inform the patient that her mammogram was benign

## 2020-10-06 ENCOUNTER — OFFICE VISIT (OUTPATIENT)
Dept: FAMILY MEDICINE CLINIC | Facility: CLINIC | Age: 65
End: 2020-10-06

## 2020-10-06 VITALS
HEIGHT: 65 IN | OXYGEN SATURATION: 94 % | DIASTOLIC BLOOD PRESSURE: 82 MMHG | HEART RATE: 80 BPM | TEMPERATURE: 96.9 F | BODY MASS INDEX: 30.92 KG/M2 | SYSTOLIC BLOOD PRESSURE: 128 MMHG | WEIGHT: 185.6 LBS

## 2020-10-06 DIAGNOSIS — F51.01 PRIMARY INSOMNIA: ICD-10-CM

## 2020-10-06 DIAGNOSIS — F41.1 GENERALIZED ANXIETY DISORDER: ICD-10-CM

## 2020-10-06 DIAGNOSIS — R68.2 DRY MOUTH: Primary | ICD-10-CM

## 2020-10-06 DIAGNOSIS — E03.4 HYPOTHYROIDISM DUE TO ACQUIRED ATROPHY OF THYROID: ICD-10-CM

## 2020-10-06 PROCEDURE — 99214 OFFICE O/P EST MOD 30 MIN: CPT | Performed by: PHYSICIAN ASSISTANT

## 2020-10-06 RX ORDER — BUSPIRONE HYDROCHLORIDE 10 MG/1
10 TABLET ORAL 2 TIMES DAILY PRN
Qty: 180 TABLET | Refills: 3 | Status: SHIPPED | OUTPATIENT
Start: 2020-10-06 | End: 2020-12-03

## 2020-10-06 NOTE — PROGRESS NOTES
Subjective   Genople Jill is a 65 y.o. female.       Chief Complaint -dry mouth    History of Present Illness -      Dry mouth-  She complains of dry mouth and decreased urination over the past 6 months.  She denies any pain with urination or hematuria.    Generalized anxiety disorder-  Stable with the use of hydroxyzine.  We discussed the potential side effects of hydroxyzine including dry mouth and decreased urination.  She denies any SI or HI.    Insomnia-stable with amitriptyline    Hypothyroidism-  Stable with Synthroid 100 mcg daily.  We discussed her last elevated TSH but since she is not having underactive thyroid symptoms at this time plan to continue current dose of Synthroid.  Lab Results   Component Value Date    TSH 13.600 (H) 06/29/2020     The following portions of the patient's history were reviewed and updated as appropriate: allergies, current medications, past family history, past medical history, past social history, past surgical history and problem list.    Review of Systems   Constitutional: Negative for activity change, appetite change, fatigue and fever.   HENT: Negative for ear pain, sinus pressure and sore throat.         Dry mouth   Eyes: Negative for pain and visual disturbance.   Respiratory: Negative for cough and chest tightness.    Cardiovascular: Negative for chest pain and palpitations.   Gastrointestinal: Negative for abdominal pain, constipation, diarrhea, nausea and vomiting.   Endocrine: Negative for polydipsia and polyuria.   Genitourinary: Positive for difficulty urinating. Negative for dysuria and frequency.   Musculoskeletal: Negative for back pain and myalgias.   Skin: Negative for color change and rash.   Allergic/Immunologic: Negative for food allergies and immunocompromised state.   Neurological: Negative for dizziness, syncope and headaches.   Hematological: Negative for adenopathy. Does not bruise/bleed easily.   Psychiatric/Behavioral: Positive for sleep  "disturbance. Negative for hallucinations and suicidal ideas. The patient is nervous/anxious.        /82   Pulse 80   Temp 96.9 °F (36.1 °C)   Ht 165.1 cm (65\")   Wt 84.2 kg (185 lb 9.6 oz)   SpO2 94%   BMI 30.89 kg/m²   Lab on 06/29/2020   Component Date Value Ref Range Status   • Free T4 06/29/2020 1.06  0.93 - 1.70 ng/dL Final   • TSH 06/29/2020 13.600* 0.270 - 4.200 uIU/mL Final   • Total Cholesterol 06/29/2020 162  0 - 200 mg/dL Final   • Triglycerides 06/29/2020 79  0 - 150 mg/dL Final   • HDL Cholesterol 06/29/2020 67* 40 - 60 mg/dL Final   • LDL Cholesterol  06/29/2020 79  0 - 100 mg/dL Final   • VLDL Cholesterol 06/29/2020 15.8  mg/dL Final   • LDL/HDL Ratio 06/29/2020 1.18   Final   • Glucose 06/29/2020 93  65 - 99 mg/dL Final   • BUN 06/29/2020 22  8 - 23 mg/dL Final   • Creatinine 06/29/2020 1.14* 0.57 - 1.00 mg/dL Final   • Sodium 06/29/2020 139  136 - 145 mmol/L Final   • Potassium 06/29/2020 4.5  3.5 - 5.2 mmol/L Final   • Chloride 06/29/2020 102  98 - 107 mmol/L Final   • CO2 06/29/2020 26.6  22.0 - 29.0 mmol/L Final   • Calcium 06/29/2020 9.6  8.6 - 10.5 mg/dL Final   • Total Protein 06/29/2020 7.4  6.0 - 8.5 g/dL Final   • Albumin 06/29/2020 4.60  3.50 - 5.20 g/dL Final   • ALT (SGPT) 06/29/2020 24  1 - 33 U/L Final   • AST (SGOT) 06/29/2020 25  1 - 32 U/L Final   • Alkaline Phosphatase 06/29/2020 65  39 - 117 U/L Final   • Total Bilirubin 06/29/2020 0.4  0.2 - 1.2 mg/dL Final   • eGFR Non African Amer 06/29/2020 48* >60 mL/min/1.73 Final   • Globulin 06/29/2020 2.8  gm/dL Final   • A/G Ratio 06/29/2020 1.6  g/dL Final   • BUN/Creatinine Ratio 06/29/2020 19.3  7.0 - 25.0 Final   • Anion Gap 06/29/2020 10.4  5.0 - 15.0 mmol/L Final       Physical Exam  Vitals signs and nursing note reviewed.   Constitutional:       Appearance: Normal appearance. She is well-developed.   HENT:      Head: Normocephalic and atraumatic.      Right Ear: Tympanic membrane normal.      Left Ear: Tympanic " membrane normal.      Nose: Nose normal.      Mouth/Throat:      Mouth: Mucous membranes are dry.      Pharynx: No oropharyngeal exudate or posterior oropharyngeal erythema.   Eyes:      Extraocular Movements: Extraocular movements intact.      Conjunctiva/sclera: Conjunctivae normal.   Neck:      Musculoskeletal: Normal range of motion and neck supple.      Thyroid: No thyromegaly.      Trachea: No tracheal deviation.   Cardiovascular:      Rate and Rhythm: Normal rate and regular rhythm.      Heart sounds: Normal heart sounds. No murmur.   Pulmonary:      Effort: Pulmonary effort is normal. No respiratory distress.      Breath sounds: Normal breath sounds. No wheezing.   Abdominal:      General: Bowel sounds are normal.      Palpations: Abdomen is soft.      Tenderness: There is no abdominal tenderness. There is no guarding.   Musculoskeletal: Normal range of motion.         General: No tenderness.   Lymphadenopathy:      Cervical: No cervical adenopathy.   Skin:     General: Skin is warm and dry.      Findings: No rash.   Neurological:      General: No focal deficit present.      Mental Status: She is alert and oriented to person, place, and time.   Psychiatric:         Mood and Affect: Mood normal.         Behavior: Behavior normal.         Thought Content: Thought content normal.         Assessment/Plan     Diagnoses and all orders for this visit:    Dry mouth  Comments:  Discontinue hydroxyzine as dry mouth is likely a side effect  Advised patient to increase water intake    Generalized anxiety disorder  Comments:  Discontinue hydroxyzine secondary to dry mouth  Start BuSpar twice daily as needed  Continue amitriptyline nightly  Orders:  -     busPIRone (BUSPAR) 10 MG tablet; Take 1 tablet by mouth 2 (Two) Times a Day As Needed (anxiety).    Primary insomnia  Comments:  Continue amitriptyline    Hypothyroidism due to acquired atrophy of thyroid    Other orders  -     Cancel: Pneumococcal Polysaccharide Vaccine  23-Valent Greater Than or Equal To 1yo Subcutaneous / IM            This document has been electronically signed by:  Paulette Wallis PA-C

## 2020-10-14 ENCOUNTER — FLU SHOT (OUTPATIENT)
Dept: FAMILY MEDICINE CLINIC | Facility: CLINIC | Age: 65
End: 2020-10-14

## 2020-10-14 DIAGNOSIS — Z23 NEED FOR INFLUENZA VACCINATION: ICD-10-CM

## 2020-10-14 PROCEDURE — 90686 IIV4 VACC NO PRSV 0.5 ML IM: CPT | Performed by: GENERAL PRACTICE

## 2020-10-14 PROCEDURE — G0008 ADMIN INFLUENZA VIRUS VAC: HCPCS | Performed by: GENERAL PRACTICE

## 2020-10-20 ENCOUNTER — CLINICAL SUPPORT (OUTPATIENT)
Dept: FAMILY MEDICINE CLINIC | Facility: CLINIC | Age: 65
End: 2020-10-20

## 2020-10-20 DIAGNOSIS — Z92.29: Primary | ICD-10-CM

## 2020-11-03 ENCOUNTER — OFFICE VISIT (OUTPATIENT)
Dept: FAMILY MEDICINE CLINIC | Facility: CLINIC | Age: 65
End: 2020-11-03

## 2020-11-03 VITALS — BODY MASS INDEX: 30.82 KG/M2 | HEIGHT: 65 IN | WEIGHT: 185 LBS

## 2020-11-03 DIAGNOSIS — E78.2 MIXED HYPERLIPIDEMIA: ICD-10-CM

## 2020-11-03 DIAGNOSIS — F41.1 GENERALIZED ANXIETY DISORDER: ICD-10-CM

## 2020-11-03 DIAGNOSIS — R30.0 DYSURIA: Primary | ICD-10-CM

## 2020-11-03 DIAGNOSIS — F51.04 PSYCHOPHYSIOLOGICAL INSOMNIA: ICD-10-CM

## 2020-11-03 PROCEDURE — G2025 DIS SITE TELE SVCS RHC/FQHC: HCPCS | Performed by: PHYSICIAN ASSISTANT

## 2020-11-03 RX ORDER — AMITRIPTYLINE HYDROCHLORIDE 100 MG/1
100 TABLET, FILM COATED ORAL NIGHTLY
Qty: 30 TABLET | Refills: 5 | Status: SHIPPED | OUTPATIENT
Start: 2020-11-03 | End: 2020-12-03 | Stop reason: SDUPTHER

## 2020-11-03 RX ORDER — PHENAZOPYRIDINE HYDROCHLORIDE 100 MG/1
100 TABLET, FILM COATED ORAL 3 TIMES DAILY PRN
Qty: 30 TABLET | Refills: 0 | Status: SHIPPED | OUTPATIENT
Start: 2020-11-03 | End: 2020-12-03

## 2020-11-03 RX ORDER — NITROFURANTOIN 25; 75 MG/1; MG/1
100 CAPSULE ORAL 2 TIMES DAILY
Qty: 20 CAPSULE | Refills: 0 | Status: SHIPPED | OUTPATIENT
Start: 2020-11-03 | End: 2020-12-03

## 2020-11-03 NOTE — PROGRESS NOTES
Kasandra Melchor is a 65 y.o. female.     Telephone Visit    You have chosen to receive care through a telephone visit. Do you consent to use a telephone visit for your medical care today? Yes    This visit has been rescheduled as a phone visit to comply with patient safety concerns in accordance with CDC recommendations. Total time of discussion was 11 minutes.    Chief Complaint -dysuria    History of Present Illness -      Dysuria-  She complains of moderate burning pain with urination and increased frequency of urination over the past 24 hours.    Anxiety-  She reports anxiety is controlled with BuSpar 10 mg daily.  Her dry mouth has significantly improved since discontinuing the hydroxyzine.    Insomnia-  Not at goal since she continues to take amitriptyline 50 mg daily and has discontinued the hydroxyzine.  She states that she is having trouble going to sleep without the hydroxyzine    Hyperlipidemia-stable with TriCor and Crestor  Lab Results   Component Value Date    CHOL 162 06/29/2020    CHOL 171 05/31/2019    CHOL 150 08/21/2018     Lab Results   Component Value Date    TRIG 79 06/29/2020    TRIG 63 05/31/2019    TRIG 94 08/21/2018     Lab Results   Component Value Date    HDL 67 (H) 06/29/2020    HDL 71 (H) 05/31/2019    HDL 60 08/21/2018     Lab Results   Component Value Date    LDL 79 06/29/2020    LDL 87 05/31/2019    LDL 71 08/21/2018         The following portions of the patient's history were reviewed and updated as appropriate: allergies, current medications, past family history, past medical history, past social history, past surgical history and problem list.    Review of Systems   Constitutional: Negative for activity change, appetite change, fatigue and fever.   HENT: Negative for ear pain, sinus pressure and sore throat.    Eyes: Negative for pain and visual disturbance.   Respiratory: Negative for cough and chest tightness.    Cardiovascular: Negative for chest pain and  "palpitations.   Gastrointestinal: Negative for abdominal pain, constipation, diarrhea, nausea and vomiting.   Endocrine: Negative for polydipsia and polyuria.   Genitourinary: Positive for dysuria and frequency.   Musculoskeletal: Negative for back pain and myalgias.   Skin: Negative for color change and rash.   Allergic/Immunologic: Negative for food allergies and immunocompromised state.   Neurological: Negative for dizziness, syncope and headaches.   Hematological: Negative for adenopathy. Does not bruise/bleed easily.   Psychiatric/Behavioral: Positive for sleep disturbance. Negative for hallucinations and suicidal ideas. The patient is nervous/anxious.        Ht 165.1 cm (65\")   Wt 83.9 kg (185 lb) Comment: Patient reported vitals today  BMI 30.79 kg/m²   Lab on 06/29/2020   Component Date Value Ref Range Status   • Free T4 06/29/2020 1.06  0.93 - 1.70 ng/dL Final   • TSH 06/29/2020 13.600* 0.270 - 4.200 uIU/mL Final   • Total Cholesterol 06/29/2020 162  0 - 200 mg/dL Final   • Triglycerides 06/29/2020 79  0 - 150 mg/dL Final   • HDL Cholesterol 06/29/2020 67* 40 - 60 mg/dL Final   • LDL Cholesterol  06/29/2020 79  0 - 100 mg/dL Final   • VLDL Cholesterol 06/29/2020 15.8  mg/dL Final   • LDL/HDL Ratio 06/29/2020 1.18   Final   • Glucose 06/29/2020 93  65 - 99 mg/dL Final   • BUN 06/29/2020 22  8 - 23 mg/dL Final   • Creatinine 06/29/2020 1.14* 0.57 - 1.00 mg/dL Final   • Sodium 06/29/2020 139  136 - 145 mmol/L Final   • Potassium 06/29/2020 4.5  3.5 - 5.2 mmol/L Final   • Chloride 06/29/2020 102  98 - 107 mmol/L Final   • CO2 06/29/2020 26.6  22.0 - 29.0 mmol/L Final   • Calcium 06/29/2020 9.6  8.6 - 10.5 mg/dL Final   • Total Protein 06/29/2020 7.4  6.0 - 8.5 g/dL Final   • Albumin 06/29/2020 4.60  3.50 - 5.20 g/dL Final   • ALT (SGPT) 06/29/2020 24  1 - 33 U/L Final   • AST (SGOT) 06/29/2020 25  1 - 32 U/L Final   • Alkaline Phosphatase 06/29/2020 65  39 - 117 U/L Final   • Total Bilirubin 06/29/2020 0.4  " 0.2 - 1.2 mg/dL Final   • eGFR Non African Amer 06/29/2020 48* >60 mL/min/1.73 Final   • Globulin 06/29/2020 2.8  gm/dL Final   • A/G Ratio 06/29/2020 1.6  g/dL Final   • BUN/Creatinine Ratio 06/29/2020 19.3  7.0 - 25.0 Final   • Anion Gap 06/29/2020 10.4  5.0 - 15.0 mmol/L Final       Physical Exam  Vitals signs reviewed.   Neurological:      Mental Status: She is alert and oriented to person, place, and time.   Psychiatric:         Mood and Affect: Mood normal.         Thought Content: Thought content normal.         Assessment/Plan     Diagnoses and all orders for this visit:    1. Dysuria (Primary)  Comments:  Start Macrobid and Pyridium  Advised conservative measures including increased water intake and avoiding soda pop  Orders:  -     nitrofurantoin, macrocrystal-monohydrate, (Macrobid) 100 MG capsule; Take 1 capsule by mouth 2 (Two) Times a Day.  Dispense: 20 capsule; Refill: 0  -     phenazopyridine (Pyridium) 100 MG tablet; Take 1 tablet by mouth 3 (Three) Times a Day As Needed for Bladder Spasms.  Dispense: 30 tablet; Refill: 0    2. Psychophysiological insomnia  Comments:  Increase amitriptyline 100 mg nightly  Orders:  -     amitriptyline (ELAVIL) 100 MG tablet; Take 1 tablet by mouth Every Night.  Dispense: 30 tablet; Refill: 5    3. Generalized anxiety disorder  Comments:  Continue BuSpar 10 mg twice daily    4. Mixed hyperlipidemia  Comments:  Advised low-cholesterol diet  Continue Crestor and TriCor            This document has been electronically signed by:  Paulette Wallis PA-C

## 2020-12-03 ENCOUNTER — OFFICE VISIT (OUTPATIENT)
Dept: FAMILY MEDICINE CLINIC | Facility: CLINIC | Age: 65
End: 2020-12-03

## 2020-12-03 VITALS
HEIGHT: 65 IN | SYSTOLIC BLOOD PRESSURE: 125 MMHG | BODY MASS INDEX: 31.32 KG/M2 | WEIGHT: 188 LBS | OXYGEN SATURATION: 95 % | HEART RATE: 95 BPM | DIASTOLIC BLOOD PRESSURE: 70 MMHG | TEMPERATURE: 97.1 F

## 2020-12-03 DIAGNOSIS — F41.1 GENERALIZED ANXIETY DISORDER: Primary | ICD-10-CM

## 2020-12-03 DIAGNOSIS — F51.04 PSYCHOPHYSIOLOGICAL INSOMNIA: ICD-10-CM

## 2020-12-03 PROCEDURE — 99213 OFFICE O/P EST LOW 20 MIN: CPT | Performed by: PHYSICIAN ASSISTANT

## 2020-12-03 RX ORDER — HYDROXYZINE 50 MG/1
50 TABLET, FILM COATED ORAL 3 TIMES DAILY PRN
COMMUNITY
End: 2020-12-03 | Stop reason: SDUPTHER

## 2020-12-03 RX ORDER — AMITRIPTYLINE HYDROCHLORIDE 100 MG/1
100 TABLET, FILM COATED ORAL NIGHTLY
Qty: 90 TABLET | Refills: 3 | Status: SHIPPED | OUTPATIENT
Start: 2020-12-03 | End: 2021-03-30 | Stop reason: SDUPTHER

## 2020-12-03 RX ORDER — HYDROXYZINE 50 MG/1
50 TABLET, FILM COATED ORAL 3 TIMES DAILY PRN
Qty: 90 TABLET | Refills: 3 | Status: SHIPPED | OUTPATIENT
Start: 2020-12-03 | End: 2021-02-01

## 2020-12-03 RX ORDER — HYDROXYZINE HYDROCHLORIDE 25 MG/1
25 TABLET, FILM COATED ORAL 3 TIMES DAILY PRN
COMMUNITY
End: 2020-12-03

## 2020-12-03 NOTE — PROGRESS NOTES
"Subjective   Genople Jill is a 65 y.o. female.       Chief Complaint -anxiety    History of Present Illness -      Anxiety-  She was unable to tolerate buspirone due to dizziness.  She states she discontinued the medicine after a few days of intolerable dizziness.  She reports that she did not notice an improvement in her dryness (dry mouth vaginal dryness, dry skin) when she was off of the hydroxyzine so she started it back.  She reports anxiety symptoms are stable with hydroxyzine as needed.    Insomnia-  Controlled with amitriptyline 100 mg nightly    The following portions of the patient's history were reviewed and updated as appropriate: allergies, current medications, past family history, past medical history, past social history, past surgical history and problem list.    Review of Systems   Constitutional: Negative for activity change, appetite change, fatigue and fever.   HENT: Negative for ear pain, sinus pressure and sore throat.    Eyes: Negative for pain and visual disturbance.   Respiratory: Negative for cough and chest tightness.    Cardiovascular: Negative for chest pain and palpitations.   Gastrointestinal: Negative for abdominal pain, constipation, diarrhea, nausea and vomiting.   Endocrine: Negative for polydipsia and polyuria.   Genitourinary: Negative for dysuria and frequency.   Musculoskeletal: Negative for back pain and myalgias.   Skin: Negative for color change and rash.   Allergic/Immunologic: Negative for food allergies and immunocompromised state.   Neurological: Negative for dizziness, syncope and headaches.   Hematological: Negative for adenopathy. Does not bruise/bleed easily.   Psychiatric/Behavioral: Positive for sleep disturbance. Negative for dysphoric mood, hallucinations and suicidal ideas. The patient is nervous/anxious.        /70   Pulse 95   Temp 97.1 °F (36.2 °C) (Temporal)   Ht 165.1 cm (65\")   Wt 85.3 kg (188 lb)   SpO2 95%   BMI 31.28 kg/m²       Physical " Exam  Vitals signs and nursing note reviewed.   Constitutional:       Appearance: Normal appearance. She is well-developed.   Eyes:      Extraocular Movements: Extraocular movements intact.      Conjunctiva/sclera: Conjunctivae normal.   Cardiovascular:      Rate and Rhythm: Normal rate and regular rhythm.      Heart sounds: Normal heart sounds. No murmur.   Pulmonary:      Effort: Pulmonary effort is normal. No respiratory distress.      Breath sounds: Normal breath sounds. No wheezing.   Musculoskeletal:         General: No tenderness.   Skin:     General: Skin is warm and dry.      Findings: No rash.   Neurological:      Mental Status: She is alert and oriented to person, place, and time.   Psychiatric:         Mood and Affect: Mood normal.         Behavior: Behavior normal.         Thought Content: Thought content normal.         Assessment/Plan     Diagnoses and all orders for this visit:    1. Generalized anxiety disorder (Primary)  Comments:  Discontinued buspirone secondary to dizziness  Restart hydroxyzine  She declines medication for help with vaginal dryness and constipation at this time  Orders:  -     hydrOXYzine (ATARAX) 50 MG tablet; Take 1 tablet by mouth 3 (Three) Times a Day As Needed for Itching.  Dispense: 90 tablet; Refill: 3    2. Psychophysiological insomnia  Comments:  Continue amitriptyline 100 mg nightly  Orders:  -     hydrOXYzine (ATARAX) 50 MG tablet; Take 1 tablet by mouth 3 (Three) Times a Day As Needed for Itching.  Dispense: 90 tablet; Refill: 3  -     amitriptyline (ELAVIL) 100 MG tablet; Take 1 tablet by mouth Every Night.  Dispense: 90 tablet; Refill: 3            This document has been electronically signed by:  Paulette Wallis PA-C

## 2021-02-01 DIAGNOSIS — F41.1 GENERALIZED ANXIETY DISORDER: ICD-10-CM

## 2021-02-01 DIAGNOSIS — F51.04 PSYCHOPHYSIOLOGICAL INSOMNIA: ICD-10-CM

## 2021-02-01 RX ORDER — HYDROXYZINE 50 MG/1
TABLET, FILM COATED ORAL
Qty: 90 TABLET | Refills: 5 | Status: SHIPPED | OUTPATIENT
Start: 2021-02-01 | End: 2021-03-30 | Stop reason: SDUPTHER

## 2021-03-30 DIAGNOSIS — E78.2 MIXED HYPERLIPIDEMIA: ICD-10-CM

## 2021-03-30 DIAGNOSIS — I10 BENIGN ESSENTIAL HYPERTENSION: ICD-10-CM

## 2021-03-30 DIAGNOSIS — F51.04 PSYCHOPHYSIOLOGICAL INSOMNIA: ICD-10-CM

## 2021-03-30 DIAGNOSIS — E03.4 HYPOTHYROIDISM DUE TO ACQUIRED ATROPHY OF THYROID: ICD-10-CM

## 2021-03-30 DIAGNOSIS — F41.1 GENERALIZED ANXIETY DISORDER: ICD-10-CM

## 2021-03-30 RX ORDER — FENOFIBRATE 145 MG/1
145 TABLET, COATED ORAL DAILY
Qty: 90 TABLET | Refills: 3 | Status: SHIPPED | OUTPATIENT
Start: 2021-03-30 | End: 2021-08-09

## 2021-03-30 RX ORDER — LEVOTHYROXINE SODIUM 0.1 MG/1
100 TABLET ORAL DAILY
Qty: 90 TABLET | Refills: 3 | Status: SHIPPED | OUTPATIENT
Start: 2021-03-30 | End: 2022-03-08 | Stop reason: SDUPTHER

## 2021-03-30 RX ORDER — ROSUVASTATIN CALCIUM 20 MG/1
20 TABLET, COATED ORAL NIGHTLY
Qty: 90 TABLET | Refills: 3 | Status: SHIPPED | OUTPATIENT
Start: 2021-03-30 | End: 2022-03-08 | Stop reason: SDUPTHER

## 2021-03-30 RX ORDER — AMITRIPTYLINE HYDROCHLORIDE 100 MG/1
100 TABLET, FILM COATED ORAL NIGHTLY
Qty: 90 TABLET | Refills: 3 | Status: SHIPPED | OUTPATIENT
Start: 2021-03-30 | End: 2022-03-08 | Stop reason: SDUPTHER

## 2021-03-30 RX ORDER — HYDROXYZINE 50 MG/1
50 TABLET, FILM COATED ORAL 3 TIMES DAILY
Qty: 90 TABLET | Refills: 5 | Status: SHIPPED | OUTPATIENT
Start: 2021-03-30 | End: 2022-03-08 | Stop reason: SDUPTHER

## 2021-03-30 RX ORDER — LOSARTAN POTASSIUM 100 MG/1
100 TABLET ORAL DAILY
Qty: 90 TABLET | Refills: 3 | Status: SHIPPED | OUTPATIENT
Start: 2021-03-30 | End: 2021-08-09

## 2021-03-30 NOTE — TELEPHONE ENCOUNTER
Caller: June Melchor    Relationship: Self    Best call back number: [phone]    Medication needed:   Requested Prescriptions      No prescriptions requested or ordered in this encounter       What additional details did the patient provide when requesting the medication: PATIENT NEEDED REFILLS ON 5 MEDICATIONS BUT COULD NOT TELL ME WHICH ONES.  SHE SUGGESTED WE CALL [facility].      Does the patient have less than a 3 day supply:  [] Yes  [x] No    What is the patient's preferred pharmacy: [address] - [phone]  - [phone] FX

## 2021-05-06 ENCOUNTER — OFFICE VISIT (OUTPATIENT)
Dept: FAMILY MEDICINE CLINIC | Facility: CLINIC | Age: 66
End: 2021-05-06

## 2021-05-06 VITALS
HEIGHT: 65 IN | OXYGEN SATURATION: 97 % | TEMPERATURE: 96.8 F | BODY MASS INDEX: 30.16 KG/M2 | WEIGHT: 181 LBS | SYSTOLIC BLOOD PRESSURE: 146 MMHG | HEART RATE: 80 BPM | DIASTOLIC BLOOD PRESSURE: 70 MMHG

## 2021-05-06 DIAGNOSIS — E78.2 MIXED HYPERLIPIDEMIA: Chronic | ICD-10-CM

## 2021-05-06 DIAGNOSIS — E03.4 HYPOTHYROIDISM DUE TO ACQUIRED ATROPHY OF THYROID: Chronic | ICD-10-CM

## 2021-05-06 DIAGNOSIS — Z00.00 MEDICARE ANNUAL WELLNESS VISIT, SUBSEQUENT: Primary | ICD-10-CM

## 2021-05-06 DIAGNOSIS — B37.2 CANDIDIASIS, INTERTRIGINOUS: ICD-10-CM

## 2021-05-06 DIAGNOSIS — F41.1 GENERALIZED ANXIETY DISORDER: Chronic | ICD-10-CM

## 2021-05-06 DIAGNOSIS — K21.9 GASTROESOPHAGEAL REFLUX DISEASE WITHOUT ESOPHAGITIS: Chronic | ICD-10-CM

## 2021-05-06 DIAGNOSIS — L85.3 DRY SKIN: Chronic | ICD-10-CM

## 2021-05-06 DIAGNOSIS — M25.512 ACUTE PAIN OF LEFT SHOULDER: ICD-10-CM

## 2021-05-06 DIAGNOSIS — F32.9 MAJOR DEPRESSION, CHRONIC: Chronic | ICD-10-CM

## 2021-05-06 DIAGNOSIS — I10 BENIGN ESSENTIAL HYPERTENSION: Chronic | ICD-10-CM

## 2021-05-06 DIAGNOSIS — F51.01 PRIMARY INSOMNIA: Chronic | ICD-10-CM

## 2021-05-06 DIAGNOSIS — N95.9 MENOPAUSAL AND POSTMENOPAUSAL DISORDER: Chronic | ICD-10-CM

## 2021-05-06 PROCEDURE — G0439 PPPS, SUBSEQ VISIT: HCPCS | Performed by: PHYSICIAN ASSISTANT

## 2021-05-06 PROCEDURE — 96372 THER/PROPH/DIAG INJ SC/IM: CPT | Performed by: PHYSICIAN ASSISTANT

## 2021-05-06 RX ORDER — KETOROLAC TROMETHAMINE 30 MG/ML
30 INJECTION, SOLUTION INTRAMUSCULAR; INTRAVENOUS ONCE
Status: COMPLETED | OUTPATIENT
Start: 2021-05-06 | End: 2021-05-06

## 2021-05-06 RX ORDER — METHYLPREDNISOLONE ACETATE 80 MG/ML
80 INJECTION, SUSPENSION INTRA-ARTICULAR; INTRALESIONAL; INTRAMUSCULAR; SOFT TISSUE ONCE
Status: COMPLETED | OUTPATIENT
Start: 2021-05-06 | End: 2021-05-06

## 2021-05-06 RX ORDER — IBUPROFEN 600 MG/1
TABLET ORAL
COMMUNITY
Start: 2021-03-22 | End: 2022-03-08

## 2021-05-06 RX ORDER — NYSTATIN 100000 U/G
CREAM TOPICAL AS NEEDED
Qty: 30 G | Refills: 3 | Status: SHIPPED | OUTPATIENT
Start: 2021-05-06 | End: 2022-03-08

## 2021-05-06 RX ADMIN — KETOROLAC TROMETHAMINE 30 MG: 30 INJECTION, SOLUTION INTRAMUSCULAR; INTRAVENOUS at 14:10

## 2021-05-06 RX ADMIN — METHYLPREDNISOLONE ACETATE 80 MG: 80 INJECTION, SUSPENSION INTRA-ARTICULAR; INTRALESIONAL; INTRAMUSCULAR; SOFT TISSUE at 14:09

## 2021-05-08 NOTE — PATIENT INSTRUCTIONS
Medicare Wellness  Personal Prevention Plan of Service     Date of Office Visit:  2021  Encounter Provider:  EDD Vargas  Place of Service:  [facility]  Patient Name: June Melchor  :  1955    As part of the Medicare Wellness portion of your visit today, we are providing you with this personalized preventive plan of services (PPPS). This plan is based upon recommendations of the [place] Preventive Services Task Force (USPSTF) and the Advisory Committee on Immunization Practices (ACIP).    This lists the preventive care services that should be considered, and provides dates of when you are due. Items listed as completed are up-to-date and do not require any further intervention.    Health Maintenance   Topic Date Due   • ZOSTER VACCINE (2 of 2) 2016   • DXA SCAN  2021   • COVID-19 Vaccine (1) 2022 (Originally 9/10/1971)   • LIPID PANEL  2021   • MAMMOGRAM  07/10/2021   • INFLUENZA VACCINE  2021   • COLORECTAL CANCER SCREENING  2022   • ANNUAL WELLNESS VISIT  2022   • TDAP/TD VACCINES (2 - Td) 2027   • HEPATITIS C SCREENING  Completed   • Pneumococcal Vaccine 65+  Completed   • PAP SMEAR  Discontinued       Orders Placed This Encounter   Procedures   • Comprehensive Metabolic Panel     Standing Status:   Future     Standing Expiration Date:   2022     Order Specific Question:   Release to patient     Answer:   Immediate   • TSH     Standing Status:   Future     Standing Expiration Date:   2022     Order Specific Question:   Release to patient     Answer:   Immediate   • T4, Free     Standing Status:   Future     Standing Expiration Date:   2022     Order Specific Question:   Release to patient     Answer:   Immediate   • Lipid Panel     Standing Status:   Future     Standing Expiration Date:   2022   • Ambulatory Referral to Dermatology     Referral Priority:   Routine     Referral Type:    Consultation     Referral Reason:   Patient Preference     Requested Specialty:   Dermatology     Number of Visits Requested:   1       Return in about 3 months (around 8/6/2021) for Followup with Regi Caldwell just prior to return.          Advance Directive    Advance directives are legal documents that let you make choices ahead of time about your health care and medical treatment in case you become unable to communicate for yourself. Advance directives are a way for you to make known your wishes to family, friends, and health care providers. This can let others know about your end-of-life care if you become unable to communicate.  Discussing and writing advance directives should happen over time rather than all at once. Advance directives can be changed depending on your situation and what you want, even after you have signed the advance directives.  There are different types of advance directives, such as:  · Medical power of .  · Living will.  · Do not resuscitate (DNR) or do not attempt resuscitation (DNAR) order.  Health care proxy and medical power of   A health care proxy is also called a health care agent. This is a person who is appointed to make medical decisions for you in cases where you are unable to make the decisions yourself. Generally, people choose someone they know well and trust to represent their preferences. Make sure to ask this person for an agreement to act as your proxy. A proxy may have to exercise judgment in the event of a medical decision for which your wishes are not known.  A medical power of  is a legal document that names your health care proxy. Depending on the laws in your state, after the document is written, it may also need to be:  · Signed.  · Notarized.  · Dated.  · Copied.  · Witnessed.  · Incorporated into your medical record.  You may also want to appoint someone to manage your money in a situation in which you are unable to do so. This is called  a durable power of  for finances. It is a separate legal document from the durable power of  for health care. You may choose the same person or someone different from your health care proxy to act as your agent in money matters.  If you do not appoint a proxy, or if there is a concern that the proxy is not acting in your best interests, a court may appoint a guardian to act on your behalf.  Living will  A living will is a set of instructions that state your wishes about medical care when you cannot express them yourself. Health care providers should keep a copy of your living will in your medical record. You may want to give a copy to family members or friends. To alert caregivers in case of an emergency, you can place a card in your wallet to let them know that you have a living will and where they can find it. A living will is used if you become:  · Terminally ill.  · Disabled.  · Unable to communicate or make decisions.  Items to consider in your living will include:  · To use or not to use life-support equipment, such as dialysis machines and breathing machines (ventilators).  · A DNR or DNAR order. This tells health care providers not to use cardiopulmonary resuscitation (CPR) if breathing or heartbeat stops.  · To use or not to use tube feeding.  · To be given or not to be given food and fluids.  · Comfort (palliative) care when the goal becomes comfort rather than a cure.  · Donation of organs and tissues.  A living will does not give instructions for distributing your money and property if you should pass away.  DNR or DNAR  A DNR or DNAR order is a request not to have CPR in the event that your heart stops beating or you stop breathing. If a DNR or DNAR order has not been made and shared, a health care provider will try to help any patient whose heart has stopped or who has stopped breathing. If you plan to have surgery, talk with your health care provider about how your DNR or DNAR order  will be followed if problems occur.  What if I do not have an advance directive?  If you do not have an advance directive, some states assign family decision makers to act on your behalf based on how closely you are related to them. Each state has its own laws about advance directives. You may want to check with your health care provider, , or state representative about the laws in your state.  Summary  · Advance directives are the legal documents that allow you to make choices ahead of time about your health care and medical treatment in case you become unable to tell others about your care.  · The process of discussing and writing advance directives should happen over time. You can change the advance directives, even after you have signed them.  · Advance directives include DNR or DNAR orders, living gao, and designating an agent as your medical power of .  This information is not intended to replace advice given to you by your health care provider. Make sure you discuss any questions you have with your health care provider.  Document Revised: 01/28/2021 Document Reviewed: 07/16/2020  Elsevier Patient Education © 2021 [employer]      Fall Prevention in the Home, Adult  Falls can cause injuries. They can happen to people of all ages. There are many things you can do to make your home safe and to help prevent falls. Ask for help when making these changes, if needed.  What actions can I take to prevent falls?  General Instructions  · Use good lighting in all rooms. Replace any light bulbs that burn out.  · Turn on the lights when you go into a dark area. Use night-lights.  · Keep items that you use often in easy-to-reach places. Lower the shelves around your home if necessary.  · Set up your furniture so you have a clear path. Avoid moving your furniture around.  · Do not have throw rugs and other things on the floor that can make you trip.  · Avoid walking on wet floors.  · If any of your floors  are uneven, fix them.  · Add color or contrast paint or tape to clearly vance and help you see:  ? Any grab bars or handrails.  ? First and last steps of stairways.  ? Where the edge of each step is.  · If you use a stepladder:  ? Make sure that it is fully opened. Do not climb a closed stepladder.  ? Make sure that both sides of the stepladder are locked into place.  ? Ask someone to hold the stepladder for you while you use it.  · If there are any pets around you, be aware of where they are.  What can I do in the bathroom?         · Keep the floor dry. Clean up any water that spills onto the floor as soon as it happens.  · Remove soap buildup in the tub or shower regularly.  · Use non-skid mats or decals on the floor of the tub or shower.  · Attach bath mats securely with double-sided, non-slip rug tape.  · If you need to sit down in the shower, use a plastic, non-slip stool.  · Install grab bars by the toilet and in the tub and shower. Do not use towel bars as grab bars.  What can I do in the bedroom?  · Make sure that you have a light by your bed that is easy to reach.  · Do not use any sheets or blankets that are too big for your bed. They should not hang down onto the floor.  · Have a firm chair that has side arms. You can use this for support while you get dressed.  What can I do in the kitchen?  · Clean up any spills right away.  · If you need to reach something above you, use a strong step stool that has a grab bar.  · Keep electrical cords out of the way.  · Do not use floor polish or wax that makes floors slippery. If you must use wax, use non-skid floor wax.  What can I do with my stairs?  · Do not leave any items on the stairs.  · Make sure that you have a light switch at the top of the stairs and the bottom of the stairs. If you do not have them, ask someone to add them for you.  · Make sure that there are handrails on both sides of the stairs, and use them. Fix handrails that are broken or loose. Make  sure that handrails are as long as the stairways.  · Install non-slip stair treads on all stairs in your home.  · Avoid having throw rugs at the top or bottom of the stairs. If you do have throw rugs, attach them to the floor with carpet tape.  · Choose a carpet that does not hide the edge of the steps on the stairway.  · Check any carpeting to make sure that it is firmly attached to the stairs. Fix any carpet that is loose or worn.  What can I do on the outside of my home?  · Use bright outdoor lighting.  · Regularly fix the edges of walkways and driveways and fix any cracks.  · Remove anything that might make you trip as you walk through a door, such as a raised step or threshold.  · Trim any bushes or trees on the path to your home.  · Regularly check to see if handrails are loose or broken. Make sure that both sides of any steps have handrails.  · Install guardrails along the edges of any raised decks and porches.  · Clear walking paths of anything that might make someone trip, such as tools or rocks.  · Have any leaves, snow, or ice cleared regularly.  · Use sand or salt on walking paths during winter.  · Clean up any spills in your garage right away. This includes grease or oil spills.  What other actions can I take?  · Wear shoes that:  ? Have a low heel. Do not wear high heels.  ? Have rubber bottoms.  ? Are comfortable and fit you well.  ? Are closed at the toe. Do not wear open-toe sandals.  · Use tools that help you move around (mobility aids) if they are needed. These include:  ? Canes.  ? Walkers.  ? Scooters.  ? Crutches.  · Review your medicines with your doctor. Some medicines can make you feel dizzy. This can increase your chance of falling.  Ask your doctor what other things you can do to help prevent falls.  Where to find more information  · Centers for Disease Control and Prevention, [place]: https://cdc.gov  · National [place] on Aging: https://yl5krvg.elizabeth.nih.gov  Contact a doctor if:  · You  are afraid of falling at home.  · You feel weak, drowsy, or dizzy at home.  · You fall at home.  Summary  · There are many simple things that you can do to make your home safe and to help prevent falls.  · Ways to make your home safe include removing tripping hazards and installing grab bars in the bathroom.  · Ask for help when making these changes in your home.  This information is not intended to replace advice given to you by your health care provider. Make sure you discuss any questions you have with your health care provider.  Document Revised: 04/09/2020 Document Reviewed: 08/02/2018  "[employer] Patient Education © 2021 "[employer] Inc.      Fat and Cholesterol Restricted Eating Plan  Getting too much fat and cholesterol in your diet may cause health problems. Choosing the right foods helps keep your fat and cholesterol at normal levels. This can keep you from getting certain diseases.  Your doctor may recommend an eating plan that includes:  · Total fat: ______% or less of total calories a day.  · Saturated fat: ______% or less of total calories a day.  · Cholesterol: less than _________mg a day.  · Fiber: ______g a day.  What are tips for following this plan?  Meal planning  · At meals, divide your plate into four equal parts:  ? Fill one-half of your plate with vegetables and green salads.  ? Fill one-fourth of your plate with whole grains.  ? Fill one-fourth of your plate with low-fat (lean) protein foods.  · Eat fish that is high in omega-3 fats at least two times a week. This includes mackerel, tuna, sardines, and salmon.  · Eat foods that are high in fiber, such as whole grains, beans, apples, broccoli, carrots, peas, and barley.  General tips    · Work with your doctor to lose weight if you need to.  · Avoid:  ? Foods with added sugar.  ? Fried foods.  ? Foods with partially hydrogenated oils.  · Limit alcohol intake to no more than 1 drink a day for nonpregnant women and 2 drinks a day for men. One drink  "equals 12 oz of beer, 5 oz of wine, or 1½ oz of hard liquor.  Reading food labels  · Check food labels for:  ? Trans fats.  ? Partially hydrogenated oils.  ? Saturated fat (g) in each serving.  ? Cholesterol (mg) in each serving.  ? Fiber (g) in each serving.  · Choose foods with healthy fats, such as:  ? Monounsaturated fats.  ? Polyunsaturated fats.  ? Omega-3 fats.  · Choose grain products that have whole grains. Look for the word \"whole\" as the first word in the ingredient list.  Cooking  · Cook foods using low-fat methods. These include baking, boiling, grilling, and broiling.  · Eat more home-cooked foods. Eat at restaurants and buffets less often.  · Avoid cooking using saturated fats, such as butter, cream, palm oil, palm kernel oil, and coconut oil.  Recommended foods    Fruits  · All fresh, canned (in natural juice), or frozen fruits.  Vegetables  · Fresh or frozen vegetables (raw, steamed, roasted, or grilled). Green salads.  Grains  · Whole grains, such as whole wheat or whole grain breads, crackers, cereals, and pasta. Unsweetened oatmeal, bulgur, barley, quinoa, or brown rice. Corn or whole wheat flour tortillas.  Meats and other protein foods  · Ground beef (85% or leaner), grass-fed beef, or beef trimmed of fat. Skinless chicken or turkey. Ground chicken or turkey. Pork trimmed of fat. All fish and seafood. Egg whites. Dried beans, peas, or lentils. Unsalted nuts or seeds. Unsalted canned beans. Nut butters without added sugar or oil.  Dairy  · Low-fat or nonfat dairy products, such as skim or 1% milk, 2% or reduced-fat cheeses, low-fat and fat-free ricotta or cottage cheese, or plain low-fat and nonfat yogurt.  Fats and oils  · Tub margarine without trans fats. Light or reduced-fat mayonnaise and salad dressings. Avocado. Olive, canola, sesame, or safflower oils.  The items listed above may not be a complete list of foods and beverages you can eat. Contact a dietitian for more information.  Foods to " avoid  Fruits  · Canned fruit in heavy syrup. Fruit in cream or butter sauce. Fried fruit.  Vegetables  · Vegetables cooked in cheese, cream, or butter sauce. Fried vegetables.  Grains  · White bread. White pasta. White rice. Cornbread. Bagels, pastries, and croissants. Crackers and snack foods that contain trans fat and hydrogenated oils.  Meats and other protein foods  · Fatty cuts of meat. Ribs, chicken wings, morfin, sausage, bologna, salami, chitterlings, fatback, hot dogs, bratwurst, and packaged lunch meats. Liver and organ meats. Whole eggs and egg yolks. Chicken and turkey with skin. Fried meat.  Dairy  · Whole or 2% milk, cream, half-and-half, and cream cheese. Whole milk cheeses. Whole-fat or sweetened yogurt. Full-fat cheeses. Nondairy creamers and whipped toppings. Processed cheese, cheese spreads, and cheese curds.  Beverages  · Alcohol. Sugar-sweetened drinks such as sodas, lemonade, and fruit drinks.  Fats and oils  · Butter, stick margarine, lard, shortening, ghee, or morfin fat. Coconut, palm kernel, and palm oils.  Sweets and desserts  · Corn syrup, sugars, honey, and molasses. Candy. Jam and jelly. Syrup. Sweetened cereals. Cookies, pies, cakes, donuts, muffins, and ice cream.  The items listed above may not be a complete list of foods and beverages you should avoid. Contact a dietitian for more information.  Summary  · Choosing the right foods helps keep your fat and cholesterol at normal levels. This can keep you from getting certain diseases.  · At meals, fill one-half of your plate with vegetables and green salads.  · Eat high-fiber foods, like whole grains, beans, apples, carrots, peas, and barley.  · Limit added sugar, saturated fats, alcohol, and fried foods.  This information is not intended to replace advice given to you by your health care provider. Make sure you discuss any questions you have with your health care provider.  Document Revised: 08/21/2019 Document Reviewed:  09/04/2018  Elsevier Patient Education © 2021 [employer].      Insomnia  Insomnia is a sleep disorder that makes it difficult to fall asleep or stay asleep. Insomnia can cause fatigue, low energy, difficulty concentrating, mood swings, and poor performance at work or school.  There are three different ways to classify insomnia:  · Difficulty falling asleep.  · Difficulty staying asleep.  · Waking up too early in the morning.  Any type of insomnia can be long-term (chronic) or short-term (acute). Both are common. Short-term insomnia usually lasts for three months or less. Chronic insomnia occurs at least three times a week for longer than three months.  What are the causes?  Insomnia may be caused by another condition, situation, or substance, such as:  · Anxiety.  · Certain medicines.  · Gastroesophageal reflux disease (GERD) or other gastrointestinal conditions.  · Asthma or other breathing conditions.  · Restless legs syndrome, sleep apnea, or other sleep disorders.  · Chronic pain.  · Menopause.  · Stroke.  · Abuse of alcohol, tobacco, or illegal drugs.  · Mental health conditions, such as depression.  · Caffeine.  · Neurological disorders, such as Alzheimer's disease.  · An overactive thyroid (hyperthyroidism).  Sometimes, the cause of insomnia may not be known.  What increases the risk?  Risk factors for insomnia include:  · Gender. Women are affected more often than men.  · Age. Insomnia is more common as you get older.  · Stress.  · Lack of exercise.  · Irregular work schedule or working night shifts.  · Traveling between different time zones.  · Certain medical and mental health conditions.  What are the signs or symptoms?  If you have insomnia, the main symptom is having trouble falling asleep or having trouble staying asleep. This may lead to other symptoms, such as:  · Feeling fatigued or having low energy.  · Feeling nervous about going to sleep.  · Not feeling rested in the morning.  · Having trouble  concentrating.  · Feeling irritable, anxious, or depressed.  How is this diagnosed?  This condition may be diagnosed based on:  · Your symptoms and medical history. Your health care provider may ask about:  ? Your sleep habits.  ? Any medical conditions you have.  ? Your mental health.  · A physical exam.  How is this treated?  Treatment for insomnia depends on the cause. Treatment may focus on treating an underlying condition that is causing insomnia. Treatment may also include:  · Medicines to help you sleep.  · Counseling or therapy.  · Lifestyle adjustments to help you sleep better.  Follow these instructions at home:  Eating and drinking    · Limit or avoid alcohol, caffeinated beverages, and cigarettes, especially close to bedtime. These can disrupt your sleep.  · Do not eat a large meal or eat spicy foods right before bedtime. This can lead to digestive discomfort that can make it hard for you to sleep.  Sleep habits    · Keep a sleep diary to help you and your health care provider figure out what could be causing your insomnia. Write down:  ? When you sleep.  ? When you wake up during the night.  ? How well you sleep.  ? How rested you feel the next day.  ? Any side effects of medicines you are taking.  ? What you eat and drink.  · Make your bedroom a dark, comfortable place where it is easy to fall asleep.  ? Put up shades or blackout curtains to block light from outside.  ? Use a white noise machine to block noise.  ? Keep the temperature cool.  · Limit screen use before bedtime. This includes:  ? Watching TV.  ? Using your smartphone, tablet, or computer.  · Stick to a routine that includes going to bed and waking up at the same times every day and night. This can help you fall asleep faster. Consider making a quiet activity, such as reading, part of your nighttime routine.  · Try to avoid taking naps during the day so that you sleep better at night.  · Get out of bed if you are still awake after 15  minutes of trying to sleep. Keep the lights down, but try reading or doing a quiet activity. When you feel sleepy, go back to bed.  General instructions  · Take over-the-counter and prescription medicines only as told by your health care provider.  · Exercise regularly, as told by your health care provider. Avoid exercise starting several hours before bedtime.  · Use relaxation techniques to manage stress. Ask your health care provider to suggest some techniques that may work well for you. These may include:  ? Breathing exercises.  ? Routines to release muscle tension.  ? Visualizing peaceful scenes.  · Make sure that you drive carefully. Avoid driving if you feel very sleepy.  · Keep all follow-up visits as told by your health care provider. This is important.  Contact a health care provider if:  · You are tired throughout the day.  · You have trouble in your daily routine due to sleepiness.  · You continue to have sleep problems, or your sleep problems get worse.  Get help right away if:  · You have serious thoughts about hurting yourself or someone else.  If you ever feel like you may hurt yourself or others, or have thoughts about taking your own life, get help right away. You can go to your nearest emergency department or call:  · Your local emergency services (911 in the [place]  · A suicide crisis helpline, such as the National Suicide Prevention Lifeline at 1-215.897.5989. This is open 24 hours a day.  Summary  · Insomnia is a sleep disorder that makes it difficult to fall asleep or stay asleep.  · Insomnia can be long-term (chronic) or short-term (acute).  · Treatment for insomnia depends on the cause. Treatment may focus on treating an underlying condition that is causing insomnia.  · Keep a sleep diary to help you and your health care provider figure out what could be causing your insomnia.  This information is not intended to replace advice given to you by your health care provider. Make sure you discuss  any questions you have with your health care provider.  Document Revised: 11/30/2018 Document Reviewed: 09/27/2018  Elsevier Patient Education © 2021 [employer]

## 2021-06-21 ENCOUNTER — OFFICE VISIT (OUTPATIENT)
Dept: FAMILY MEDICINE CLINIC | Facility: CLINIC | Age: 66
End: 2021-06-21

## 2021-06-21 VITALS — HEIGHT: 65 IN | WEIGHT: 181 LBS | BODY MASS INDEX: 30.16 KG/M2

## 2021-06-21 DIAGNOSIS — R50.9 FEVER, UNSPECIFIED FEVER CAUSE: Primary | ICD-10-CM

## 2021-06-21 DIAGNOSIS — J06.9 ACUTE URI: ICD-10-CM

## 2021-06-21 LAB
FLUAV RNA RESP QL NAA+PROBE: NOT DETECTED
FLUBV RNA RESP QL NAA+PROBE: NOT DETECTED
SARS-COV-2 RNA RESP QL NAA+PROBE: NOT DETECTED

## 2021-06-21 PROCEDURE — G0071 COMM SVCS BY RHC/FQHC 5 MIN: HCPCS | Performed by: NURSE PRACTITIONER

## 2021-06-21 PROCEDURE — 87636 SARSCOV2 & INF A&B AMP PRB: CPT | Performed by: NURSE PRACTITIONER

## 2021-06-21 RX ORDER — AZITHROMYCIN 250 MG/1
TABLET, FILM COATED ORAL
Qty: 6 TABLET | Refills: 0 | Status: SHIPPED | OUTPATIENT
Start: 2021-06-21 | End: 2021-08-06

## 2021-06-21 NOTE — PROGRESS NOTES
You have chosen to receive care through a telephone visit today. Do you consent to use a telephone visit for your medical care today? Yes    Establish patient makes contact with office of APRN to discuss health conditions.  Patient is due for routine checkup but due to current pandemic is not recommended to present to the office for face-to-face evaluation.      Chief Complaint   Patient presents with   • Fever       Brief HPI/ROS obtained as follows:    Fever-Patient with complaint of Covid-like symptoms.  She has noted sinus pressure rhinorrhea for multiple days.  No fever.  She has had no known exposures.  She is not taking any medications at home for symptoms.    The following portions of the patient's history were reviewed and updated as appropriate: CC, ROS, allergies, current medications, past family history, past medical history, past social history, past surgical history and problem list.    Review of Systems   Constitutional: Positive for fatigue and fever. Negative for appetite change.   HENT: Positive for congestion and sinus pressure. Negative for ear pain, nosebleeds, postnasal drip, rhinorrhea, sore throat, trouble swallowing and voice change.    Eyes: Negative for blurred vision, photophobia and visual disturbance.   Respiratory: Negative for cough, chest tightness, shortness of breath and wheezing.    Cardiovascular: Negative for chest pain and palpitations.   Gastrointestinal: Negative for abdominal pain, blood in stool, constipation, diarrhea and nausea.   Endocrine: Negative for cold intolerance, heat intolerance and polydipsia.   Genitourinary: Negative for dysuria and hematuria.   Musculoskeletal: Negative for arthralgias, back pain, gait problem and myalgias.   Skin: Negative for color change, pallor and bruise.   Allergic/Immunologic: Negative.    Neurological: Negative for dizziness and headache.   Hematological: Negative.    Psychiatric/Behavioral: Negative for sleep disturbance and  "suicidal ideas. The patient is not nervous/anxious.    All other systems reviewed and are negative.      Assessment     Ht 165.1 cm (65\")   Wt 82.1 kg (181 lb)   BMI 30.12 kg/m²     Physical Exam     Patient alert and oriented.  Able to follow conversation without sounding breathless.  No obvious distress.  Thought processes congruent with conversation.  Answers and ask questions without difficulty.    Assessment     Diagnoses and all orders for this visit:    1. Fever, unspecified fever cause (Primary)  -     COVID-19 and FLU A/B PCR - Swab, Nasopharynx; Future  -     COVID-19 and FLU A/B PCR - Swab, Nasopharynx    2. Acute URI  -     azithromycin (Zithromax Z-Rahat) 250 MG tablet; Take 2 tablets by mouth on day 1, then 1 tablet daily on days 2-5  Dispense: 6 tablet; Refill: 0        Patient instructed and advised to call if symptoms are increasing or new symptoms occur.    Understands reasons for urgent and emergent care.  Patient (& family) verbalized agreement for treatment plan.   Generalized precautions advised including social distancing, hand washing, cough/sneeze hygiene.     Following Covid swab today.  Suspect sinusitis.    Instructed to complete all of antibiotics for acute illness.  Increase PO fluids, avoid/limit caffeine.  Do not save any of the meds for later use.  Rest PRN    RTC 3 to 5 days, sooner if needed for problems or concerns    This visit has been rescheduled as a phone visit to comply with patient safety concerns in accordance with CDC recommendations. Total time of discussion was 5 minutes.        This document has been electronically signed by:  GUILLERMO Camp, FNP-C    Dragon disclaimer:  Much of this encounter note is an electronic transcription/translation of spoken language to printed text. The electronic translation of spoken language may permit erroneous, or at times, nonsensical words or phrases to be inadvertently transcribed; Although I have reviewed the note for such " errors, some may still exist.

## 2021-06-23 ENCOUNTER — HOSPITAL ENCOUNTER (EMERGENCY)
Dept: HOSPITAL 79 - ER1 | Age: 66
Discharge: HOME | End: 2021-06-23
Payer: COMMERCIAL

## 2021-06-23 DIAGNOSIS — S16.1XXA: ICD-10-CM

## 2021-06-23 DIAGNOSIS — S09.90XA: Primary | ICD-10-CM
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
## 2021-06-23 DIAGNOSIS — I10: ICD-10-CM

## 2021-06-29 ENCOUNTER — OFFICE VISIT (OUTPATIENT)
Dept: FAMILY MEDICINE CLINIC | Facility: CLINIC | Age: 66
End: 2021-06-29

## 2021-06-29 VITALS — WEIGHT: 180 LBS | BODY MASS INDEX: 29.99 KG/M2 | HEIGHT: 65 IN

## 2021-06-29 DIAGNOSIS — J40 BRONCHITIS: Primary | ICD-10-CM

## 2021-06-29 PROCEDURE — G2025 DIS SITE TELE SVCS RHC/FQHC: HCPCS | Performed by: PHYSICIAN ASSISTANT

## 2021-06-29 RX ORDER — ALBUTEROL SULFATE 90 UG/1
2 AEROSOL, METERED RESPIRATORY (INHALATION) EVERY 4 HOURS PRN
Qty: 18 G | Refills: 0 | Status: SHIPPED | OUTPATIENT
Start: 2021-06-29 | End: 2021-08-09

## 2021-06-29 RX ORDER — PREDNISONE 20 MG/1
TABLET ORAL
Qty: 10 TABLET | Refills: 0 | Status: SHIPPED | OUTPATIENT
Start: 2021-06-29 | End: 2021-07-09

## 2021-06-29 RX ORDER — ACETAMINOPHEN AND CODEINE PHOSPHATE 300; 30 MG/1; MG/1
TABLET ORAL
COMMUNITY
Start: 2021-06-15 | End: 2022-03-08

## 2021-06-29 RX ORDER — GUAIFENESIN/DEXTROMETHORPHAN 100-10MG/5
5 SYRUP ORAL 3 TIMES DAILY PRN
Qty: 118 ML | Refills: 0 | Status: SHIPPED | OUTPATIENT
Start: 2021-06-29 | End: 2021-08-06

## 2021-06-29 RX ORDER — LEVOFLOXACIN 500 MG/1
500 TABLET, FILM COATED ORAL DAILY
Qty: 10 TABLET | Refills: 0 | Status: SHIPPED | OUTPATIENT
Start: 2021-06-29 | End: 2021-08-06

## 2021-06-29 NOTE — PROGRESS NOTES
"Kasandra Melchor is a 65 y.o. female.     Telephone visit    You have chosen to receive care through a telephone visit. Do you consent to use a telephone visit for your medical care today? Yes    This visit has been rescheduled as a phone visit to comply with patient safety concerns in accordance with CDC recommendations. Total time of discussion was 8 minutes.      Chief Complaint -cough    History of Present Illness -    ROS    Cough-  Patient complains of chest congestion, nasal congestion, productive cough and wheezing for the past 10 days.  Minimal relief with azithromycin or Delsym.    6/21/2021 - influenza A  Negative influenza B  Negative Covid testing      The following portions of the patient's history were reviewed and updated as appropriate: allergies, current medications, past family history, past medical history, past social history, past surgical history and problem list.    Review of Systems   Constitutional: Positive for activity change, appetite change, chills and fever. Negative for fatigue.   HENT: Positive for congestion. Negative for sinus pressure and sore throat.    Eyes: Negative for redness and visual disturbance.   Respiratory: Positive for cough and wheezing. Negative for chest tightness.    Cardiovascular: Negative for chest pain and leg swelling.   Gastrointestinal: Negative for constipation, diarrhea, nausea and vomiting.       Objective  Vital signs:  Ht 165.1 cm (65\")   Wt 81.6 kg (180 lb)   BMI 29.95 kg/m²     Physical Exam  Vitals and nursing note reviewed.   Pulmonary:      Effort: Pulmonary effort is normal.      Breath sounds: Normal breath sounds. No wheezing.      Comments: No wheezing or heavy breathing noted on phone today  Neurological:      Mental Status: She is alert and oriented to person, place, and time.   Psychiatric:         Mood and Affect: Mood normal.         Thought Content: Thought content normal.                    Assessment/Plan     Diagnoses " and all orders for this visit:    1. Bronchitis (Primary)  -     guaiFENesin-dextromethorphan (ROBITUSSIN DM) 100-10 MG/5ML syrup; Take 5 mL by mouth 3 (Three) Times a Day As Needed for Cough.  Dispense: 118 mL; Refill: 0  -     predniSONE (DELTASONE) 20 MG tablet; 2 daily  Dispense: 10 tablet; Refill: 0  -     levoFLOXacin (Levaquin) 500 MG tablet; Take 1 tablet by mouth Daily.  Dispense: 10 tablet; Refill: 0  -     albuterol sulfate  (90 Base) MCG/ACT inhaler; Inhale 2 puffs Every 4 (Four) Hours As Needed for Shortness of Air.  Dispense: 18 g; Refill: 0    Acute bronchitis  Advised regarding symptomatic treatment.  Suggested OTC remedies.  Antibiotic as per orders.  Encouraged to report if any worse or if any new symptoms.  Call in 5 days if symptoms aren't resolving.            Patient was given instructions and counseling regarding his condition or for health maintenance advice. Please see specific information pulled into the AVS if appropriate      This document has been electronically signed by:  Paulette Wallis PA-C

## 2021-07-12 ENCOUNTER — HOSPITAL ENCOUNTER (OUTPATIENT)
Dept: MAMMOGRAPHY | Facility: HOSPITAL | Age: 66
Discharge: HOME | End: 2021-07-12
Admitting: PHYSICIAN ASSISTANT

## 2021-07-12 DIAGNOSIS — Z12.31 VISIT FOR SCREENING MAMMOGRAM: ICD-10-CM

## 2021-07-12 PROCEDURE — 77067 SCR MAMMO BI INCL CAD: CPT | Performed by: RADIOLOGY

## 2021-07-12 PROCEDURE — 77063 BREAST TOMOSYNTHESIS BI: CPT | Performed by: RADIOLOGY

## 2021-07-12 PROCEDURE — 77063 BREAST TOMOSYNTHESIS BI: CPT

## 2021-07-12 PROCEDURE — 77067 SCR MAMMO BI INCL CAD: CPT

## 2021-07-30 ENCOUNTER — LAB (OUTPATIENT)
Dept: FAMILY MEDICINE CLINIC | Facility: CLINIC | Age: 66
End: 2021-07-30

## 2021-07-30 DIAGNOSIS — E03.4 HYPOTHYROIDISM DUE TO ACQUIRED ATROPHY OF THYROID: Chronic | ICD-10-CM

## 2021-07-30 DIAGNOSIS — E78.2 MIXED HYPERLIPIDEMIA: Chronic | ICD-10-CM

## 2021-07-30 DIAGNOSIS — I10 BENIGN ESSENTIAL HYPERTENSION: Chronic | ICD-10-CM

## 2021-07-30 DIAGNOSIS — K21.9 GASTROESOPHAGEAL REFLUX DISEASE WITHOUT ESOPHAGITIS: ICD-10-CM

## 2021-07-30 LAB
ALBUMIN SERPL-MCNC: 4.4 G/DL (ref 3.5–5.2)
ALBUMIN/GLOB SERPL: 1.6 G/DL
ALP SERPL-CCNC: 63 U/L (ref 39–117)
ALT SERPL W P-5'-P-CCNC: 17 U/L (ref 1–33)
ANION GAP SERPL CALCULATED.3IONS-SCNC: 9.2 MMOL/L (ref 5–15)
AST SERPL-CCNC: 19 U/L (ref 1–32)
BILIRUB SERPL-MCNC: 0.4 MG/DL (ref 0–1.2)
BUN SERPL-MCNC: 26 MG/DL (ref 8–23)
BUN/CREAT SERPL: 27.4 (ref 7–25)
CALCIUM SPEC-SCNC: 9.3 MG/DL (ref 8.6–10.5)
CHLORIDE SERPL-SCNC: 103 MMOL/L (ref 98–107)
CHOLEST SERPL-MCNC: 174 MG/DL (ref 0–200)
CO2 SERPL-SCNC: 26.8 MMOL/L (ref 22–29)
CREAT SERPL-MCNC: 0.95 MG/DL (ref 0.57–1)
GFR SERPL CREATININE-BSD FRML MDRD: 59 ML/MIN/1.73
GLOBULIN UR ELPH-MCNC: 2.7 GM/DL
GLUCOSE SERPL-MCNC: 99 MG/DL (ref 65–99)
HDLC SERPL-MCNC: 70 MG/DL (ref 40–60)
LDLC SERPL CALC-MCNC: 90 MG/DL (ref 0–100)
LDLC/HDLC SERPL: 1.27 {RATIO}
POTASSIUM SERPL-SCNC: 4.1 MMOL/L (ref 3.5–5.2)
PROT SERPL-MCNC: 7.1 G/DL (ref 6–8.5)
SODIUM SERPL-SCNC: 139 MMOL/L (ref 136–145)
T4 FREE SERPL-MCNC: 1.41 NG/DL (ref 0.93–1.7)
TRIGL SERPL-MCNC: 75 MG/DL (ref 0–150)
TSH SERPL DL<=0.05 MIU/L-ACNC: 0.68 UIU/ML (ref 0.27–4.2)
VLDLC SERPL-MCNC: 14 MG/DL (ref 5–40)

## 2021-07-30 PROCEDURE — 84439 ASSAY OF FREE THYROXINE: CPT | Performed by: PHYSICIAN ASSISTANT

## 2021-07-30 PROCEDURE — 80061 LIPID PANEL: CPT | Performed by: PHYSICIAN ASSISTANT

## 2021-07-30 PROCEDURE — 36415 COLL VENOUS BLD VENIPUNCTURE: CPT | Performed by: PHYSICIAN ASSISTANT

## 2021-07-30 PROCEDURE — 84443 ASSAY THYROID STIM HORMONE: CPT | Performed by: PHYSICIAN ASSISTANT

## 2021-07-30 PROCEDURE — 80053 COMPREHEN METABOLIC PANEL: CPT | Performed by: PHYSICIAN ASSISTANT

## 2021-08-06 ENCOUNTER — OFFICE VISIT (OUTPATIENT)
Dept: FAMILY MEDICINE CLINIC | Facility: CLINIC | Age: 66
End: 2021-08-06

## 2021-08-06 VITALS
HEIGHT: 65 IN | SYSTOLIC BLOOD PRESSURE: 130 MMHG | DIASTOLIC BLOOD PRESSURE: 62 MMHG | HEART RATE: 72 BPM | WEIGHT: 179 LBS | OXYGEN SATURATION: 98 % | TEMPERATURE: 97.3 F | BODY MASS INDEX: 29.82 KG/M2

## 2021-08-06 DIAGNOSIS — E78.2 MIXED HYPERLIPIDEMIA: Chronic | ICD-10-CM

## 2021-08-06 DIAGNOSIS — I10 BENIGN ESSENTIAL HYPERTENSION: Chronic | ICD-10-CM

## 2021-08-06 DIAGNOSIS — N28.9 RENAL INSUFFICIENCY: Chronic | ICD-10-CM

## 2021-08-06 DIAGNOSIS — K21.00 GASTROESOPHAGEAL REFLUX DISEASE WITH ESOPHAGITIS WITHOUT HEMORRHAGE: Primary | Chronic | ICD-10-CM

## 2021-08-06 DIAGNOSIS — E03.4 HYPOTHYROIDISM DUE TO ACQUIRED ATROPHY OF THYROID: Chronic | ICD-10-CM

## 2021-08-06 PROCEDURE — 99214 OFFICE O/P EST MOD 30 MIN: CPT | Performed by: PHYSICIAN ASSISTANT

## 2021-08-06 RX ORDER — OMEPRAZOLE 40 MG/1
40 CAPSULE, DELAYED RELEASE ORAL DAILY
Qty: 90 CAPSULE | Refills: 3 | Status: SHIPPED | OUTPATIENT
Start: 2021-08-06 | End: 2022-03-08 | Stop reason: SDUPTHER

## 2021-08-06 RX ORDER — METOCLOPRAMIDE 10 MG/1
TABLET ORAL
COMMUNITY
Start: 2021-07-15 | End: 2021-11-05

## 2021-08-06 NOTE — PATIENT INSTRUCTIONS

## 2021-08-06 NOTE — PROGRESS NOTES
"Subjective   Genople Jill is a 65 y.o. female.       Chief Complaint -hoarseness    History of Present Illness -    ROS    Hoarseness-  Patient complains of a hoarse voice for the last 2 weeks.    Renal insufficiency-  Improved with increased water intake over the past summer.  Discussed lab work and improvement in GFR with patient today.    Hypertension-controlled with losartan    Hyperlipidemia-controlled with TriCor and Crestor    Hypothyroidism-stable with Synthroid 100 mcg daily    The following portions of the patient's history were reviewed and updated as appropriate: allergies, current medications, past family history, past medical history, past social history, past surgical history and problem list.    Review of Systems   Constitutional: Negative for activity change, appetite change, fatigue and fever.   HENT: Positive for voice change. Negative for ear pain, sinus pressure and sore throat.    Eyes: Negative for pain and visual disturbance.   Respiratory: Negative for cough and chest tightness.    Cardiovascular: Negative for chest pain and palpitations.   Gastrointestinal: Negative for abdominal pain, constipation, diarrhea, nausea and vomiting.   Endocrine: Negative for polydipsia and polyuria.   Genitourinary: Negative for dysuria and frequency.   Musculoskeletal: Negative for back pain and myalgias.   Skin: Negative for color change and rash.   Allergic/Immunologic: Negative for food allergies and immunocompromised state.   Neurological: Negative for dizziness, syncope and headaches.   Hematological: Negative for adenopathy. Does not bruise/bleed easily.   Psychiatric/Behavioral: Negative for hallucinations and suicidal ideas. The patient is not nervous/anxious.        Objective  Vital signs:  /62   Pulse 72   Temp 97.3 °F (36.3 °C) (Temporal)   Ht 165.1 cm (65\")   Wt 81.2 kg (179 lb)   SpO2 98%   BMI 29.79 kg/m²     Physical Exam  Vitals and nursing note reviewed.   Constitutional:       " Appearance: Normal appearance. She is well-developed.   HENT:      Head: Normocephalic and atraumatic.      Right Ear: Tympanic membrane normal.      Left Ear: Tympanic membrane normal.      Nose: Nose normal.      Mouth/Throat:      Mouth: Mucous membranes are moist.      Pharynx: No oropharyngeal exudate or posterior oropharyngeal erythema.   Eyes:      Extraocular Movements: Extraocular movements intact.      Conjunctiva/sclera: Conjunctivae normal.   Neck:      Thyroid: No thyromegaly.      Trachea: No tracheal deviation.   Cardiovascular:      Rate and Rhythm: Normal rate and regular rhythm.      Heart sounds: Normal heart sounds. No murmur heard.     Pulmonary:      Effort: Pulmonary effort is normal. No respiratory distress.      Breath sounds: Normal breath sounds. No wheezing.   Abdominal:      General: Bowel sounds are normal.      Palpations: Abdomen is soft.      Tenderness: There is no abdominal tenderness. There is no guarding.   Musculoskeletal:         General: No tenderness. Normal range of motion.      Cervical back: Normal range of motion and neck supple.   Lymphadenopathy:      Cervical: No cervical adenopathy.   Skin:     General: Skin is warm and dry.      Findings: No rash.   Neurological:      General: No focal deficit present.      Mental Status: She is alert and oriented to person, place, and time.   Psychiatric:         Mood and Affect: Mood normal.         Behavior: Behavior normal.         Thought Content: Thought content normal.         The following data was reviewed by: EDD Vargas on 08/06/2021:  CMP    CMP 7/30/21   Glucose 99   BUN 26 (A)   Creatinine 0.95   eGFR Non African Am 59 (A)   Sodium 139   Potassium 4.1   Chloride 103   Calcium 9.3   Albumin 4.40   Total Bilirubin 0.4   Alkaline Phosphatase 63   AST (SGOT) 19   ALT (SGPT) 17   (A) Abnormal value              Lipid Panel    Lipid Panel 7/30/21   Total Cholesterol 174   Triglycerides 75   HDL Cholesterol 70 (A)    VLDL Cholesterol 14   LDL Cholesterol  90   LDL/HDL Ratio 1.27   (A) Abnormal value            TSH    TSH 7/30/21   TSH 0.676                      Assessment/Plan     Diagnoses and all orders for this visit:    1. Gastroesophageal reflux disease with esophagitis without hemorrhage (Primary)  Comments:  Start omeprazole  Conservative measures for reflux prevention advised including elevating head of bed and no food or drink prior to bedtime  Orders:  -     omeprazole (priLOSEC) 40 MG capsule; Take 1 capsule by mouth Daily.  Dispense: 90 capsule; Refill: 3    2. Benign essential hypertension  Comments:  Continue to monitor  Continue losartan    3. Mixed hyperlipidemia  Comments:  Advised low-cholesterol diet  Continue TriCor and Crestor    4. Hypothyroidism due to acquired atrophy of thyroid  Comments:  Continue Synthroid 100 mcg daily    5. Renal insufficiency  Comments:  Continue to monitor  Advised to continue good hydration            Patient was given instructions and counseling regarding his condition or for health maintenance advice. Please see specific information pulled into the AVS if appropriate      This document has been electronically signed by:  Paulette Wallis PA-C

## 2021-08-09 DIAGNOSIS — E78.2 MIXED HYPERLIPIDEMIA: ICD-10-CM

## 2021-08-09 DIAGNOSIS — J40 BRONCHITIS: ICD-10-CM

## 2021-08-09 DIAGNOSIS — I10 BENIGN ESSENTIAL HYPERTENSION: ICD-10-CM

## 2021-08-09 RX ORDER — FENOFIBRATE 145 MG/1
TABLET, COATED ORAL
Qty: 90 TABLET | Refills: 3 | Status: SHIPPED | OUTPATIENT
Start: 2021-08-09 | End: 2022-03-08 | Stop reason: SDUPTHER

## 2021-08-09 RX ORDER — LOSARTAN POTASSIUM 100 MG/1
TABLET ORAL
Qty: 90 TABLET | Refills: 3 | Status: SHIPPED | OUTPATIENT
Start: 2021-08-09 | End: 2022-03-08 | Stop reason: SDUPTHER

## 2021-08-09 RX ORDER — ALBUTEROL SULFATE 90 UG/1
AEROSOL, METERED RESPIRATORY (INHALATION)
Qty: 18 G | Refills: 0 | Status: SHIPPED | OUTPATIENT
Start: 2021-08-09 | End: 2021-11-05 | Stop reason: SDUPTHER

## 2021-10-08 ENCOUNTER — FLU SHOT (OUTPATIENT)
Dept: FAMILY MEDICINE CLINIC | Facility: CLINIC | Age: 66
End: 2021-10-08

## 2021-10-08 DIAGNOSIS — Z23 ENCOUNTER FOR IMMUNIZATION: ICD-10-CM

## 2021-10-08 DIAGNOSIS — Z00.00 HEALTHCARE MAINTENANCE: Primary | ICD-10-CM

## 2021-10-08 PROCEDURE — G0008 ADMIN INFLUENZA VIRUS VAC: HCPCS | Performed by: PHYSICIAN ASSISTANT

## 2021-10-08 PROCEDURE — 90662 IIV NO PRSV INCREASED AG IM: CPT | Performed by: PHYSICIAN ASSISTANT

## 2021-11-05 ENCOUNTER — OFFICE VISIT (OUTPATIENT)
Dept: FAMILY MEDICINE CLINIC | Facility: CLINIC | Age: 66
End: 2021-11-05

## 2021-11-05 VITALS
SYSTOLIC BLOOD PRESSURE: 124 MMHG | TEMPERATURE: 97.1 F | BODY MASS INDEX: 28.49 KG/M2 | DIASTOLIC BLOOD PRESSURE: 76 MMHG | WEIGHT: 171 LBS | HEART RATE: 85 BPM | OXYGEN SATURATION: 99 % | HEIGHT: 65 IN

## 2021-11-05 DIAGNOSIS — I10 BENIGN ESSENTIAL HYPERTENSION: Primary | Chronic | ICD-10-CM

## 2021-11-05 DIAGNOSIS — R06.02 SHORTNESS OF BREATH: ICD-10-CM

## 2021-11-05 DIAGNOSIS — E03.4 HYPOTHYROIDISM DUE TO ACQUIRED ATROPHY OF THYROID: Chronic | ICD-10-CM

## 2021-11-05 DIAGNOSIS — E78.2 MIXED HYPERLIPIDEMIA: Chronic | ICD-10-CM

## 2021-11-05 PROCEDURE — 99214 OFFICE O/P EST MOD 30 MIN: CPT | Performed by: PHYSICIAN ASSISTANT

## 2021-11-05 RX ORDER — ALBUTEROL SULFATE 90 UG/1
2 AEROSOL, METERED RESPIRATORY (INHALATION) EVERY 6 HOURS PRN
Qty: 18 G | Refills: 0 | Status: SHIPPED | OUTPATIENT
Start: 2021-11-05 | End: 2022-03-08

## 2021-11-05 NOTE — PROGRESS NOTES
"Subjective   Genople Jill is a 66 y.o. female.       Chief Complaint -hypertension    History of Present Illness -    ROS    Hypertension-  Controlled with losartan    Hyperlipidemia-stable with Crestor and diet    Hypothyroidism-stable with Synthroid 100 mcg daily    Shortness of breath-  Patient complains of shortness of breath intermittently with seasonal allergies.  Some relief with albuterol HFA inhaler    The following portions of the patient's history were reviewed and updated as appropriate: allergies, current medications, past family history, past medical history, past social history, past surgical history and problem list.    Review of Systems    Objective  Vital signs:  /76   Pulse 85   Temp 97.1 °F (36.2 °C) (Temporal)   Ht 165.1 cm (65\")   Wt 77.6 kg (171 lb)   SpO2 99%   BMI 28.46 kg/m²     Physical Exam  Vitals and nursing note reviewed.   Constitutional:       Appearance: Normal appearance. She is well-developed.   HENT:      Head: Normocephalic and atraumatic.      Right Ear: Tympanic membrane normal.      Left Ear: Tympanic membrane normal.      Nose: Nose normal.      Mouth/Throat:      Mouth: Mucous membranes are moist.      Pharynx: No oropharyngeal exudate or posterior oropharyngeal erythema.   Eyes:      Extraocular Movements: Extraocular movements intact.      Conjunctiva/sclera: Conjunctivae normal.   Neck:      Thyroid: No thyromegaly.      Trachea: No tracheal deviation.   Cardiovascular:      Rate and Rhythm: Normal rate and regular rhythm.      Heart sounds: Normal heart sounds. No murmur heard.      Pulmonary:      Effort: Pulmonary effort is normal. No respiratory distress.      Breath sounds: Normal breath sounds. No wheezing.   Abdominal:      General: Bowel sounds are normal.      Palpations: Abdomen is soft.      Tenderness: There is no abdominal tenderness. There is no guarding.   Musculoskeletal:         General: No tenderness. Normal range of motion.      Cervical " back: Normal range of motion and neck supple.   Lymphadenopathy:      Cervical: No cervical adenopathy.   Skin:     General: Skin is warm and dry.      Findings: No rash.   Neurological:      General: No focal deficit present.      Mental Status: She is alert and oriented to person, place, and time.   Psychiatric:         Mood and Affect: Mood normal.         Behavior: Behavior normal.         Thought Content: Thought content normal.         The following data was reviewed by: EDD Vargas on 11/05/2021:  CMP    CMP 7/30/21   Glucose 99   BUN 26 (A)   Creatinine 0.95   eGFR Non African Am 59 (A)   Sodium 139   Potassium 4.1   Chloride 103   Calcium 9.3   Albumin 4.40   Total Bilirubin 0.4   Alkaline Phosphatase 63   AST (SGOT) 19   ALT (SGPT) 17   (A) Abnormal value              Lipid Panel    Lipid Panel 7/30/21   Total Cholesterol 174   Triglycerides 75   HDL Cholesterol 70 (A)   VLDL Cholesterol 14   LDL Cholesterol  90   LDL/HDL Ratio 1.27   (A) Abnormal value            TSH    TSH 7/30/21   TSH 0.676                      Assessment/Plan     Diagnoses and all orders for this visit:    1. Benign essential hypertension (Primary)  Comments:  Continue losartan  Continue to monitor BP and pulse daily    2. Shortness of breath  Comments:  Prescription written for albuterol inhaler to be used as needed  Orders:  -     albuterol sulfate HFA (Ventolin HFA) 108 (90 Base) MCG/ACT inhaler; Inhale 2 puffs Every 6 (Six) Hours As Needed for Wheezing.  Dispense: 18 g; Refill: 0    3. Mixed hyperlipidemia  Comments:  Advise low-cholesterol diet  Continue rosuvastatin    4. Hypothyroidism due to acquired atrophy of thyroid  Comments:  Continue Synthroid 100 mcg daily            Patient was given instructions and counseling regarding his condition or for health maintenance advice. Please see specific information pulled into the AVS if appropriate      This document has been electronically signed by:  Paulette Wallis PA-C

## 2021-11-05 NOTE — PATIENT INSTRUCTIONS

## 2021-11-09 ENCOUNTER — OFFICE VISIT (OUTPATIENT)
Dept: FAMILY MEDICINE CLINIC | Facility: CLINIC | Age: 66
End: 2021-11-09

## 2021-11-09 VITALS
WEIGHT: 170 LBS | DIASTOLIC BLOOD PRESSURE: 70 MMHG | OXYGEN SATURATION: 100 % | BODY MASS INDEX: 28.32 KG/M2 | SYSTOLIC BLOOD PRESSURE: 128 MMHG | TEMPERATURE: 96.8 F | HEIGHT: 65 IN | HEART RATE: 84 BPM

## 2021-11-09 DIAGNOSIS — J01.00 ACUTE NON-RECURRENT MAXILLARY SINUSITIS: Primary | ICD-10-CM

## 2021-11-09 PROCEDURE — 99213 OFFICE O/P EST LOW 20 MIN: CPT | Performed by: PHYSICIAN ASSISTANT

## 2021-11-09 PROCEDURE — 96372 THER/PROPH/DIAG INJ SC/IM: CPT | Performed by: PHYSICIAN ASSISTANT

## 2021-11-09 RX ORDER — AZITHROMYCIN 250 MG/1
TABLET, FILM COATED ORAL
Qty: 6 TABLET | Refills: 0 | Status: SHIPPED | OUTPATIENT
Start: 2021-11-09 | End: 2022-01-31

## 2021-11-09 RX ORDER — METHYLPREDNISOLONE ACETATE 80 MG/ML
80 INJECTION, SUSPENSION INTRA-ARTICULAR; INTRALESIONAL; INTRAMUSCULAR; SOFT TISSUE ONCE
Status: COMPLETED | OUTPATIENT
Start: 2021-11-09 | End: 2021-11-09

## 2021-11-09 RX ADMIN — METHYLPREDNISOLONE ACETATE 80 MG: 80 INJECTION, SUSPENSION INTRA-ARTICULAR; INTRALESIONAL; INTRAMUSCULAR; SOFT TISSUE at 09:46

## 2021-11-09 NOTE — PROGRESS NOTES
"Subjective   Genople Jill is a 66 y.o. female.       Chief Complaint -facial pressure    History of Present Illness -    ROS    Facial pressure-  Patient complains of bilateral maxillary sinus pain and pressure with nasal congestion, thick nasal discharge yellowish in coloration and sore throat for the past 3 days.  Minimal relief with albuterol.    The following portions of the patient's history were reviewed and updated as appropriate: allergies, current medications, past family history, past medical history, past social history, past surgical history and problem list.    Review of Systems    Objective  Vital signs:  /70   Pulse 84   Temp 96.8 °F (36 °C) (Temporal)   Ht 165.1 cm (65\")   Wt 77.1 kg (170 lb)   SpO2 100%   BMI 28.29 kg/m²     Physical Exam  Vitals and nursing note reviewed.   Constitutional:       General: She is not in acute distress.     Appearance: Normal appearance. She is well-developed. She is not diaphoretic.   HENT:      Head: Normocephalic and atraumatic.      Right Ear: Tympanic membrane, ear canal and external ear normal.      Left Ear: Tympanic membrane, ear canal and external ear normal.      Nose: Congestion present.      Mouth/Throat:      Mouth: Mucous membranes are moist.      Pharynx: Posterior oropharyngeal erythema present. No oropharyngeal exudate.      Comments: Tenderness noted over bilateral maxillary sinus area  Neck:      Thyroid: No thyromegaly.   Cardiovascular:      Rate and Rhythm: Normal rate and regular rhythm.      Heart sounds: Normal heart sounds. No murmur heard.      Pulmonary:      Effort: Pulmonary effort is normal.      Breath sounds: Normal breath sounds. No wheezing or rales.   Musculoskeletal:      Cervical back: Normal range of motion and neck supple.   Lymphadenopathy:      Cervical: No cervical adenopathy.   Skin:     General: Skin is warm and dry.      Findings: No rash.   Neurological:      Mental Status: She is alert and oriented to " person, place, and time.   Psychiatric:         Mood and Affect: Mood normal.         Behavior: Behavior normal.         Thought Content: Thought content normal.                    Assessment/Plan     Diagnoses and all orders for this visit:    1. Acute non-recurrent maxillary sinusitis (Primary)  Comments:  Start azithromycin and prednisone  Symptomatic care advised including warm compresses  RTC if symptoms not improved within 1 week  Orders:  -     azithromycin (Zithromax) 250 MG tablet; Take 2 tablets the first day, then 1 tablet daily for 4 days.  Dispense: 6 tablet; Refill: 0  -     methylPREDNISolone acetate (DEPO-medrol) injection 80 mg            Patient was given instructions and counseling regarding his condition or for health maintenance advice. Please see specific information pulled into the AVS if appropriate      This document has been electronically signed by:  Paulette Wallis PA-C

## 2022-01-31 ENCOUNTER — OFFICE VISIT (OUTPATIENT)
Dept: FAMILY MEDICINE CLINIC | Facility: CLINIC | Age: 67
End: 2022-01-31

## 2022-01-31 VITALS
HEIGHT: 65 IN | TEMPERATURE: 97.2 F | WEIGHT: 172 LBS | DIASTOLIC BLOOD PRESSURE: 64 MMHG | BODY MASS INDEX: 28.66 KG/M2 | HEART RATE: 88 BPM | OXYGEN SATURATION: 98 % | SYSTOLIC BLOOD PRESSURE: 136 MMHG

## 2022-01-31 DIAGNOSIS — J06.9 ACUTE URI: Primary | ICD-10-CM

## 2022-01-31 DIAGNOSIS — R05.9 COUGH: ICD-10-CM

## 2022-01-31 DIAGNOSIS — R09.81 NASAL CONGESTION: ICD-10-CM

## 2022-01-31 LAB
B PARAPERT DNA SPEC QL NAA+PROBE: NOT DETECTED
B PERT DNA SPEC QL NAA+PROBE: NOT DETECTED
C PNEUM DNA NPH QL NAA+NON-PROBE: NOT DETECTED
FLUAV SUBTYP SPEC NAA+PROBE: NOT DETECTED
FLUBV RNA ISLT QL NAA+PROBE: NOT DETECTED
HADV DNA SPEC NAA+PROBE: NOT DETECTED
HCOV 229E RNA SPEC QL NAA+PROBE: NOT DETECTED
HCOV HKU1 RNA SPEC QL NAA+PROBE: NOT DETECTED
HCOV NL63 RNA SPEC QL NAA+PROBE: NOT DETECTED
HCOV OC43 RNA SPEC QL NAA+PROBE: DETECTED
HMPV RNA NPH QL NAA+NON-PROBE: NOT DETECTED
HPIV1 RNA ISLT QL NAA+PROBE: NOT DETECTED
HPIV2 RNA SPEC QL NAA+PROBE: NOT DETECTED
HPIV3 RNA NPH QL NAA+PROBE: NOT DETECTED
HPIV4 P GENE NPH QL NAA+PROBE: NOT DETECTED
M PNEUMO IGG SER IA-ACNC: NOT DETECTED
RHINOVIRUS RNA SPEC NAA+PROBE: NOT DETECTED
RSV RNA NPH QL NAA+NON-PROBE: NOT DETECTED
SARS-COV-2 RNA NPH QL NAA+NON-PROBE: NOT DETECTED

## 2022-01-31 PROCEDURE — 0202U NFCT DS 22 TRGT SARS-COV-2: CPT | Performed by: NURSE PRACTITIONER

## 2022-01-31 PROCEDURE — 99213 OFFICE O/P EST LOW 20 MIN: CPT | Performed by: NURSE PRACTITIONER

## 2022-01-31 RX ORDER — BENZONATATE 100 MG/1
100 CAPSULE ORAL 3 TIMES DAILY PRN
Qty: 20 CAPSULE | Refills: 0 | Status: SHIPPED | OUTPATIENT
Start: 2022-01-31 | End: 2022-03-08

## 2022-01-31 RX ORDER — AZITHROMYCIN 250 MG/1
TABLET, FILM COATED ORAL
Qty: 6 TABLET | Refills: 0 | Status: SHIPPED | OUTPATIENT
Start: 2022-01-31 | End: 2022-03-08

## 2022-01-31 NOTE — PROGRESS NOTES
Please call patient and let her know her respiratory panel was positive for coronavirus Oc43, this is not the same as covid 19 but is a respiratory virus. She does not need to quarantine but it is contagious, so frequently was hands, ok to use over the counter medication for symptoms.

## 2022-01-31 NOTE — PROGRESS NOTES
Chief Complaint  Cough and Nasal Congestion    Subjective          Genople Jill is a 66 y.o. female who presents today to [facility] for initial evaluation for cough and congestion.     HPI:   Cough  This is a new problem. The current episode started in the past 7 days. The problem has been unchanged. The problem occurs every few hours. The cough is non-productive (worse when lying down). Pertinent negatives include no fever, myalgias, nasal congestion or sore throat. Nothing aggravates the symptoms. She has tried OTC cough suppressant for the symptoms. The treatment provided no relief.         Objective     Problem List:  Patient Active Problem List   Diagnosis   • Hypothyroidism due to acquired atrophy of thyroid   • Hyperlipidemia   • Benign essential hypertension   • Gastroesophageal reflux disease without esophagitis   • Generalized anxiety disorder   • Primary insomnia   • Menopausal and postmenopausal disorder   • Major depression, chronic   • Family history of colon cancer   • Family history of polyps in the colon   • Renal insufficiency       Allergy:   Allergies   Allergen Reactions   • Amoxicillin    • Ampicillin         Discontinued Medications:  Medications Discontinued During This Encounter   Medication Reason   • azithromycin (Zithromax) 250 MG tablet *Therapy completed       Current Medications:   Current Outpatient Medications   Medication Sig Dispense Refill   • acetaminophen-codeine (TYLENOL #3) 300-30 MG per tablet      • albuterol sulfate HFA (Ventolin HFA) 108 (90 Base) MCG/ACT inhaler Inhale 2 puffs Every 6 (Six) Hours As Needed for Wheezing. 18 g 0   • amitriptyline (ELAVIL) 100 MG tablet Take 1 tablet by mouth Every Night. 90 tablet 3   • fenofibrate (TRICOR) 145 MG tablet TAKE ONE (1) TABLET BY MOUTH DAILY.  90 tablet 3   • hydrOXYzine (ATARAX) 50 MG tablet Take 1 tablet by mouth 3 (Three) Times a Day. 90 tablet 5   • ibuprofen (ADVIL,MOTRIN) 600 MG  tablet      • levothyroxine (SYNTHROID, LEVOTHROID) 100 MCG tablet Take 1 tablet by mouth Daily. 90 tablet 3   • losartan (COZAAR) 100 MG tablet TAKE ONE TABLET BY MOUTH DAILY  90 tablet 3   • nystatin (MYCOSTATIN) 112727 UNIT/GM cream Apply  topically to the appropriate area as directed As Needed (rash). 30 g 3   • omeprazole (priLOSEC) 40 MG capsule Take 1 capsule by mouth Daily. 90 capsule 3   • rosuvastatin (CRESTOR) 20 MG tablet Take 1 tablet by mouth Every Night. 90 tablet 3   • azithromycin (Zithromax) 250 MG tablet Take 2 tablets the first day, then 1 tablet daily for 4 days. 6 tablet 0   • benzonatate (Tessalon Perles) 100 MG capsule Take 1 capsule by mouth 3 (Three) Times a Day As Needed for Cough. 20 capsule 0     No current facility-administered medications for this visit.       Past Medical History:  Past Medical History:   Diagnosis Date   • Anxiety disorder    • Anxious depression    • Esophageal reflux    • Hyperlipidemia    • Hypertension    • Hyperthyroidism    • Hypothyroid    • Insomnia        Past Surgical History:  Past Surgical History:   Procedure Laterality Date   • COLONOSCOPY     • ENDOSCOPY     • TUBAL ABDOMINAL LIGATION         Review of Systems:  Review of Systems   Constitutional: Negative for fever.   HENT: Positive for congestion. Negative for sore throat.    Respiratory: Positive for cough.    Cardiovascular: Negative.    Musculoskeletal: Negative for myalgias.   Neurological: Negative.    Psychiatric/Behavioral: Negative.    All other systems reviewed and are negative.      Physical Exam:  Physical Exam  Vitals and nursing note reviewed.   Constitutional:       General: She is not in acute distress.     Appearance: Normal appearance. She is not ill-appearing.      Interventions: Face mask in place.   HENT:      Head: Normocephalic and atraumatic.      Right Ear: Tympanic membrane, ear canal and external ear normal.      Left Ear: Tympanic membrane, ear canal and external ear  "normal.      Nose: Nose normal.      Mouth/Throat:      Mouth: Mucous membranes are moist.      Pharynx: Oropharynx is clear.   Cardiovascular:      Rate and Rhythm: Normal rate and regular rhythm.   Pulmonary:      Effort: Pulmonary effort is normal. No respiratory distress.      Breath sounds: Normal breath sounds.   Abdominal:      Palpations: Abdomen is soft.   Musculoskeletal:         General: Normal range of motion.      Cervical back: Normal range of motion and neck supple.   Skin:     General: Skin is warm and dry.   Neurological:      Mental Status: She is alert and oriented to person, place, and time.   Psychiatric:         Mood and Affect: Mood normal.         Behavior: Behavior normal.         Vital Signs:   /64 (BP Location: Right arm, Patient Position: Sitting)   Pulse 88   Temp 97.2 °F (36.2 °C)   Ht 165.1 cm (65\")   Wt 78 kg (172 lb)   SpO2 98%   BMI 28.62 kg/m²      Lab Results:   No visits with results within 6 Month(s) from this visit.   Latest known visit with results is:   Lab on 07/30/2021   Component Date Value Ref Range Status   • Glucose 07/30/2021 99  65 - 99 mg/dL Final   • BUN 07/30/2021 26* 8 - 23 mg/dL Final   • Creatinine 07/30/2021 0.95  0.57 - 1.00 mg/dL Final   • Sodium 07/30/2021 139  136 - 145 mmol/L Final   • Potassium 07/30/2021 4.1  3.5 - 5.2 mmol/L Final   • Chloride 07/30/2021 103  98 - 107 mmol/L Final   • CO2 07/30/2021 26.8  22.0 - 29.0 mmol/L Final   • Calcium 07/30/2021 9.3  8.6 - 10.5 mg/dL Final   • Total Protein 07/30/2021 7.1  6.0 - 8.5 g/dL Final   • Albumin 07/30/2021 4.40  3.50 - 5.20 g/dL Final   • ALT (SGPT) 07/30/2021 17  1 - 33 U/L Final   • AST (SGOT) 07/30/2021 19  1 - 32 U/L Final   • Alkaline Phosphatase 07/30/2021 63  39 - 117 U/L Final   • Total Bilirubin 07/30/2021 0.4  0.0 - 1.2 mg/dL Final   • eGFR Non  Amer 07/30/2021 59* >60 mL/min/1.73 Final   • Globulin 07/30/2021 2.7  gm/dL Final   • A/G Ratio 07/30/2021 1.6  g/dL Final   • " BUN/Creatinine Ratio 07/30/2021 27.4* 7.0 - 25.0 Final   • Anion Gap 07/30/2021 9.2  5.0 - 15.0 mmol/L Final   • TSH 07/30/2021 0.676  0.270 - 4.200 uIU/mL Final   • Free T4 07/30/2021 1.41  0.93 - 1.70 ng/dL Final   • Total Cholesterol 07/30/2021 174  0 - 200 mg/dL Final   • Triglycerides 07/30/2021 75  0 - 150 mg/dL Final   • HDL Cholesterol 07/30/2021 70* 40 - 60 mg/dL Final   • LDL Cholesterol  07/30/2021 90  0 - 100 mg/dL Final   • VLDL Cholesterol 07/30/2021 14  5 - 40 mg/dL Final   • LDL/HDL Ratio 07/30/2021 1.27   Final       EKG Results:  No orders to display       Imaging Results:  No Images in the past 120 days found..              Assessment and Plan   Diagnoses and all orders for this visit:    1. Acute URI (Primary)  -     azithromycin (Zithromax) 250 MG tablet; Take 2 tablets the first day, then 1 tablet daily for 4 days.  Dispense: 6 tablet; Refill: 0    2. Cough  Comments:  respiratory panel today  Orders:  -     Respiratory Panel PCR w/COVID-19(SARS-CoV-2) NAM/BRANDON/GUANACO/PAD/COR/MAD/ANGELIQUE In-House, NP Swab in UTM/VTM, 3-4 HR TAT - Swab, Nasopharynx; Future  -     Respiratory Panel PCR w/COVID-19(SARS-CoV-2) NAM/BRANDON/GUANACO/PAD/COR/MAD/ANGELIQUE In-House, NP Swab in UTM/VTM, 3-4 HR TAT - Swab, Nasopharynx  -     azithromycin (Zithromax) 250 MG tablet; Take 2 tablets the first day, then 1 tablet daily for 4 days.  Dispense: 6 tablet; Refill: 0  -     benzonatate (Tessalon Perles) 100 MG capsule; Take 1 capsule by mouth 3 (Three) Times a Day As Needed for Cough.  Dispense: 20 capsule; Refill: 0    3. Nasal congestion  -     Respiratory Panel PCR w/COVID-19(SARS-CoV-2) NAM/BRANDON/GUANACO/PAD/COR/MAD/ANGELIQUE In-House, NP Swab in UTM/VTM, 3-4 HR TAT - Swab, Nasopharynx; Future  -     Respiratory Panel PCR w/COVID-19(SARS-CoV-2) NAM/BRANDON/GUANACO/PAD/COR/MAD/ANGELIQUE In-House, NP Swab in UTM/VTM, 3-4 HR TAT - Swab, Nasopharynx        Meds ordered during this visit:  New Medications Ordered This Visit   Medications   • azithromycin  (Zithromax) 250 MG tablet     Sig: Take 2 tablets the first day, then 1 tablet daily for 4 days.     Dispense:  6 tablet     Refill:  0   • benzonatate (Tessalon Perles) 100 MG capsule     Sig: Take 1 capsule by mouth 3 (Three) Times a Day As Needed for Cough.     Dispense:  20 capsule     Refill:  0       Patient Instructions:  Patient instructions given for the following visit diagnosis:    ICD-10-CM ICD-9-CM   1. Acute URI  J06.9 465.9   2. Cough  R05.9 786.2   3. Nasal congestion  R09.81 478.19       Follow Up   Return if symptoms worsen or fail to improve.        This document has been electronically signed by GUILLERMO Agudelo  January 31, 2022 10:08 EST    Patient was given instructions and counseling regarding her condition or for health maintenance advice. Please see specific information pulled into the AVS if appropriate.     Part of this note may be an electronic transcription/translation of spoken language to printed text using the Dragon Dictation System.

## 2022-02-01 ENCOUNTER — TELEPHONE (OUTPATIENT)
Dept: FAMILY MEDICINE CLINIC | Facility: CLINIC | Age: 67
End: 2022-02-01

## 2022-02-01 NOTE — TELEPHONE ENCOUNTER
Caller: June Melchor    Relationship: Self    Best call back number: [phone]    Who are you requesting to speak with (clinical staff, provider,  specific staff member): CLINICAL STAFF    What was the call regarding: PATIENT STATED SHE WAS INFORMED SHE TESTED POSITIVE FOR COVID -19 ON 1/31/22 BUT WAS TOLD SHE DID NOT HAVE TO QUARANTINE. PATIENT WOULD LIKE TO KNOW IF THIS IS CORRECT.    Do you require a callback: YES

## 2022-02-01 NOTE — TELEPHONE ENCOUNTER
Called and let the patient know that she tested positive for Coronavirus OC43 not covid 19. I explained that she does not have to quarantine for this but it was contagious. I explained that if she did go out in public to be precarious.

## 2022-02-03 ENCOUNTER — TELEPHONE (OUTPATIENT)
Dept: FAMILY MEDICINE CLINIC | Facility: CLINIC | Age: 67
End: 2022-02-03

## 2022-02-03 RX ORDER — BROMPHENIRAMINE MALEATE, PSEUDOEPHEDRINE HYDROCHLORIDE, AND DEXTROMETHORPHAN HYDROBROMIDE 2; 30; 10 MG/5ML; MG/5ML; MG/5ML
5 SYRUP ORAL 4 TIMES DAILY PRN
Qty: 118 ML | Refills: 0 | Status: SHIPPED | OUTPATIENT
Start: 2022-02-03 | End: 2022-03-08

## 2022-02-03 NOTE — TELEPHONE ENCOUNTER
Patient was not positive for Covid 19.  According to our respiratory panel she was positive for coronavirus, but not specifically COVID-19.  She does not had to quarantine or isolate for coronavirus, she does not have to retest unless this is a personal preference for patient.  I would advise that she wear a mask as with any respiratory virus to prevent the spread of it to others.

## 2022-02-03 NOTE — TELEPHONE ENCOUNTER
Caller: June Melchor    Relationship: Self    Best call back number: [phone]    What is the best time to reach you: ANYTIME     Who are you requesting to speak with (clinical staff, provider,  specific staff member): CLINICAL STAFF     What was the call regarding: PATIENT WOULD LIKE A CALLBACK TO KNOW IF SHE SHOULD HAVE  A COVID TEST 2/7/22 TO SEE IF SHE IS TILL POSITIVE FOR COVID BEFORE GOING OUT IN PUBLIC.     Do you require a callback: YES

## 2022-03-08 ENCOUNTER — OFFICE VISIT (OUTPATIENT)
Dept: FAMILY MEDICINE CLINIC | Facility: CLINIC | Age: 67
End: 2022-03-08

## 2022-03-08 VITALS
SYSTOLIC BLOOD PRESSURE: 108 MMHG | BODY MASS INDEX: 28.16 KG/M2 | WEIGHT: 169 LBS | TEMPERATURE: 97.5 F | HEART RATE: 75 BPM | HEIGHT: 65 IN | OXYGEN SATURATION: 98 % | DIASTOLIC BLOOD PRESSURE: 66 MMHG

## 2022-03-08 DIAGNOSIS — Z12.11 SCREENING FOR COLON CANCER: ICD-10-CM

## 2022-03-08 DIAGNOSIS — F41.1 GENERALIZED ANXIETY DISORDER: Chronic | ICD-10-CM

## 2022-03-08 DIAGNOSIS — F51.04 PSYCHOPHYSIOLOGICAL INSOMNIA: Primary | ICD-10-CM

## 2022-03-08 DIAGNOSIS — K21.00 GASTROESOPHAGEAL REFLUX DISEASE WITH ESOPHAGITIS WITHOUT HEMORRHAGE: Chronic | ICD-10-CM

## 2022-03-08 DIAGNOSIS — I10 BENIGN ESSENTIAL HYPERTENSION: ICD-10-CM

## 2022-03-08 DIAGNOSIS — E78.2 MIXED HYPERLIPIDEMIA: ICD-10-CM

## 2022-03-08 DIAGNOSIS — E03.4 HYPOTHYROIDISM DUE TO ACQUIRED ATROPHY OF THYROID: ICD-10-CM

## 2022-03-08 PROCEDURE — 99214 OFFICE O/P EST MOD 30 MIN: CPT | Performed by: PHYSICIAN ASSISTANT

## 2022-03-08 RX ORDER — ROSUVASTATIN CALCIUM 20 MG/1
20 TABLET, COATED ORAL NIGHTLY
Qty: 90 TABLET | Refills: 3 | Status: SHIPPED | OUTPATIENT
Start: 2022-03-08 | End: 2022-12-30 | Stop reason: SDUPTHER

## 2022-03-08 RX ORDER — AMITRIPTYLINE HYDROCHLORIDE 100 MG/1
100 TABLET, FILM COATED ORAL NIGHTLY
Qty: 90 TABLET | Refills: 3 | Status: SHIPPED | OUTPATIENT
Start: 2022-03-08 | End: 2022-12-30 | Stop reason: SDUPTHER

## 2022-03-08 RX ORDER — LEVOTHYROXINE SODIUM 0.1 MG/1
100 TABLET ORAL DAILY
Qty: 90 TABLET | Refills: 3 | Status: SHIPPED | OUTPATIENT
Start: 2022-03-08 | End: 2022-09-26

## 2022-03-08 RX ORDER — FENOFIBRATE 145 MG/1
145 TABLET, COATED ORAL DAILY
Qty: 90 TABLET | Refills: 3 | Status: SHIPPED | OUTPATIENT
Start: 2022-03-08 | End: 2022-12-30 | Stop reason: SDUPTHER

## 2022-03-08 RX ORDER — OMEPRAZOLE 40 MG/1
40 CAPSULE, DELAYED RELEASE ORAL DAILY
Qty: 90 CAPSULE | Refills: 3 | Status: SHIPPED | OUTPATIENT
Start: 2022-03-08 | End: 2022-11-28

## 2022-03-08 RX ORDER — HYDROXYZINE 50 MG/1
50 TABLET, FILM COATED ORAL 3 TIMES DAILY
Qty: 270 TABLET | Refills: 3 | Status: SHIPPED | OUTPATIENT
Start: 2022-03-08 | End: 2022-12-30

## 2022-03-08 RX ORDER — LOSARTAN POTASSIUM 100 MG/1
100 TABLET ORAL DAILY
Qty: 90 TABLET | Refills: 3 | Status: SHIPPED | OUTPATIENT
Start: 2022-03-08 | End: 2022-12-30 | Stop reason: SDUPTHER

## 2022-03-08 NOTE — PROGRESS NOTES
"Subjective   Genople Jill is a 66 y.o. female.       Chief Complaint -insomnia    History of Present Illness -    ROS    Insomnia-  Stable with amitriptyline and hydroxyzine    Hyperlipidemia-stable with low-cholesterol diet, rosuvastatin and fenofibrate    Generalized anxiety disorder-stable with hydroxyzine    Hypothyroidism-stable with Synthroid 100 mcg daily    Hypertension-controlled with losartan    Gastroesophageal reflux disease-stable with omeprazole      The following portions of the patient's history were reviewed and updated as appropriate: allergies, current medications, past family history, past medical history, past social history, past surgical history and problem list.    Review of Systems    Objective  Vital signs:  /66   Pulse 75   Temp 97.5 °F (36.4 °C) (Temporal)   Ht 165.1 cm (65\")   Wt 76.7 kg (169 lb)   SpO2 98%   BMI 28.12 kg/m²     Physical Exam  Vitals and nursing note reviewed.   Constitutional:       Appearance: Normal appearance. She is well-developed.   Eyes:      Extraocular Movements: Extraocular movements intact.      Conjunctiva/sclera: Conjunctivae normal.   Cardiovascular:      Rate and Rhythm: Normal rate and regular rhythm.      Heart sounds: Normal heart sounds. No murmur heard.  Pulmonary:      Effort: Pulmonary effort is normal. No respiratory distress.      Breath sounds: Normal breath sounds. No wheezing.   Musculoskeletal:         General: No tenderness.   Skin:     General: Skin is warm and dry.      Findings: No rash.   Neurological:      Mental Status: She is alert and oriented to person, place, and time.   Psychiatric:         Mood and Affect: Mood normal.         Behavior: Behavior normal.         Thought Content: Thought content normal.         The following data was reviewed by: EDD Vargas on 03/08/2022:  CMP    CMP 7/30/21   Glucose 99   BUN 26 (A)   Creatinine 0.95   eGFR Non African Am 59 (A)   Sodium 139   Potassium 4.1   Chloride 103 "   Calcium 9.3   Albumin 4.40   Total Bilirubin 0.4   Alkaline Phosphatase 63   AST (SGOT) 19   ALT (SGPT) 17   (A) Abnormal value              Lipid Panel    Lipid Panel 7/30/21   Total Cholesterol 174   Triglycerides 75   HDL Cholesterol 70 (A)   VLDL Cholesterol 14   LDL Cholesterol  90   LDL/HDL Ratio 1.27   (A) Abnormal value            TSH    TSH 7/30/21   TSH 0.676                      Assessment/Plan     Diagnoses and all orders for this visit:    1. Psychophysiological insomnia (Primary)  Comments:  Continue amitriptyline 100 mg nightly  Orders:  -     amitriptyline (ELAVIL) 100 MG tablet; Take 1 tablet by mouth Every Night.  Dispense: 90 tablet; Refill: 3  -     hydrOXYzine (ATARAX) 50 MG tablet; Take 1 tablet by mouth 3 (Three) Times a Day.  Dispense: 270 tablet; Refill: 3    2. Mixed hyperlipidemia  Comments:  Advised low-cholesterol diet  Continue fenofibrate  Orders:  -     fenofibrate (TRICOR) 145 MG tablet; Take 1 tablet by mouth Daily.  Dispense: 90 tablet; Refill: 3  -     rosuvastatin (CRESTOR) 20 MG tablet; Take 1 tablet by mouth Every Night.  Dispense: 90 tablet; Refill: 3  -     Comprehensive Metabolic Panel; Future  -     Lipid Panel; Future    3. Generalized anxiety disorder  Comments:  Discontinued buspirone secondary to dizziness  Continue hydroxyzine  Orders:  -     hydrOXYzine (ATARAX) 50 MG tablet; Take 1 tablet by mouth 3 (Three) Times a Day.  Dispense: 270 tablet; Refill: 3  -     TSH; Future  -     T4, Free; Future    4. Hypothyroidism due to acquired atrophy of thyroid  -     levothyroxine (SYNTHROID, LEVOTHROID) 100 MCG tablet; Take 1 tablet by mouth Daily.  Dispense: 90 tablet; Refill: 3  -     TSH; Future  -     T4, Free; Future    5. Benign essential hypertension  Comments:  Continue to monitor daily blood pressure and pulse   Continue losartan  Orders:  -     losartan (COZAAR) 100 MG tablet; Take 1 tablet by mouth Daily.  Dispense: 90 tablet; Refill: 3  -     Comprehensive  Metabolic Panel; Future    6. Gastroesophageal reflux disease with esophagitis without hemorrhage  Comments:  Continue omeprazole  Advised to avoid known trigger foods  Orders:  -     omeprazole (priLOSEC) 40 MG capsule; Take 1 capsule by mouth Daily.  Dispense: 90 capsule; Refill: 3    7. Screening for colon cancer  -     Cologuard - Stool, Per Rectum; Future            Patient was given instructions and counseling regarding his condition or for health maintenance advice. Please see specific information pulled into the AVS if appropriate      This document has been electronically signed by:  Paulette Wallis PA-C

## 2022-03-09 NOTE — PATIENT INSTRUCTIONS
"Fat and Cholesterol Restricted Eating Plan  Getting too much fat and cholesterol in your diet may cause health problems. Choosing the right foods helps keep your fat and cholesterol at normal levels. This can keep you from getting certain diseases.  Your doctor may recommend an eating plan that includes:  Total fat: ______% or less of total calories a day.  Saturated fat: ______% or less of total calories a day.  Cholesterol: less than _________mg a day.  Fiber: ______g a day.  What are tips for following this plan?  Meal planning  At meals, divide your plate into four equal parts:  Fill one-half of your plate with vegetables and green salads.  Fill one-fourth of your plate with whole grains.  Fill one-fourth of your plate with low-fat (lean) protein foods.  Eat fish that is high in omega-3 fats at least two times a week. This includes mackerel, tuna, sardines, and salmon.  Eat foods that are high in fiber, such as whole grains, beans, apples, broccoli, carrots, peas, and barley.  General tips    Work with your doctor to lose weight if you need to.  Avoid:  Foods with added sugar.  Fried foods.  Foods with partially hydrogenated oils.  Limit alcohol intake to no more than 1 drink a day for nonpregnant women and 2 drinks a day for men. One drink equals 12 oz of beer, 5 oz of wine, or 1½ oz of hard liquor.    Reading food labels  Check food labels for:  Trans fats.  Partially hydrogenated oils.  Saturated fat (g) in each serving.  Cholesterol (mg) in each serving.  Fiber (g) in each serving.  Choose foods with healthy fats, such as:  Monounsaturated fats.  Polyunsaturated fats.  Omega-3 fats.  Choose grain products that have whole grains. Look for the word \"whole\" as the first word in the ingredient list.  Cooking  Cook foods using low-fat methods. These include baking, boiling, grilling, and broiling.  Eat more home-cooked foods. Eat at restaurants and buffets less often.  Avoid cooking using saturated fats, such as " butter, cream, palm oil, palm kernel oil, and coconut oil.  Recommended foods    Fruits  All fresh, canned (in natural juice), or frozen fruits.  Vegetables  Fresh or frozen vegetables (raw, steamed, roasted, or grilled). Green salads.  Grains  Whole grains, such as whole wheat or whole grain breads, crackers, cereals, and pasta. Unsweetened oatmeal, bulgur, barley, quinoa, or brown rice. Corn or whole wheat flour tortillas.  Meats and other protein foods  Ground beef (85% or leaner), grass-fed beef, or beef trimmed of fat. Skinless chicken or turkey. Ground chicken or turkey. Pork trimmed of fat. All fish and seafood. Egg whites. Dried beans, peas, or lentils. Unsalted nuts or seeds. Unsalted canned beans. Nut butters without added sugar or oil.  Dairy  Low-fat or nonfat dairy products, such as skim or 1% milk, 2% or reduced-fat cheeses, low-fat and fat-free ricotta or cottage cheese, or plain low-fat and nonfat yogurt.  Fats and oils  Tub margarine without trans fats. Light or reduced-fat mayonnaise and salad dressings. Avocado. Olive, canola, sesame, or safflower oils.  The items listed above may not be a complete list of foods and beverages you can eat. Contact a dietitian for more information.  Foods to avoid  Fruits  Canned fruit in heavy syrup. Fruit in cream or butter sauce. Fried fruit.  Vegetables  Vegetables cooked in cheese, cream, or butter sauce. Fried vegetables.  Grains  White bread. White pasta. White rice. Cornbread. Bagels, pastries, and croissants. Crackers and snack foods that contain trans fat and hydrogenated oils.  Meats and other protein foods  Fatty cuts of meat. Ribs, chicken wings, morfin, sausage, bologna, salami, chitterlings, fatback, hot dogs, bratwurst, and packaged lunch meats. Liver and organ meats. Whole eggs and egg yolks. Chicken and turkey with skin. Fried meat.  Dairy  Whole or 2% milk, cream, half-and-half, and cream cheese. Whole milk cheeses. Whole-fat or sweetened yogurt.  Full-fat cheeses. Nondairy creamers and whipped toppings. Processed cheese, cheese spreads, and cheese curds.  Beverages  Alcohol. Sugar-sweetened drinks such as sodas, lemonade, and fruit drinks.  Fats and oils  Butter, stick margarine, lard, shortening, ghee, or morfin fat. Coconut, palm kernel, and palm oils.  Sweets and desserts  Corn syrup, sugars, honey, and molasses. Candy. Jam and jelly. Syrup. Sweetened cereals. Cookies, pies, cakes, donuts, muffins, and ice cream.  The items listed above may not be a complete list of foods and beverages you should avoid. Contact a dietitian for more information.  Summary  Choosing the right foods helps keep your fat and cholesterol at normal levels. This can keep you from getting certain diseases.  At meals, fill one-half of your plate with vegetables and green salads.  Eat high-fiber foods, like whole grains, beans, apples, carrots, peas, and barley.  Limit added sugar, saturated fats, alcohol, and fried foods.  This information is not intended to replace advice given to you by your health care provider. Make sure you discuss any questions you have with your health care provider.  Document Revised: 04/21/2021 Document Reviewed: 04/21/2021  [employer]vier Patient Education © 2021 [employer]

## 2022-05-02 ENCOUNTER — OFFICE VISIT (OUTPATIENT)
Dept: FAMILY MEDICINE CLINIC | Facility: CLINIC | Age: 67
End: 2022-05-02

## 2022-05-02 VITALS
BODY MASS INDEX: 27.86 KG/M2 | HEIGHT: 65 IN | SYSTOLIC BLOOD PRESSURE: 130 MMHG | DIASTOLIC BLOOD PRESSURE: 72 MMHG | TEMPERATURE: 97.1 F | OXYGEN SATURATION: 97 % | WEIGHT: 167.2 LBS | HEART RATE: 91 BPM

## 2022-05-02 DIAGNOSIS — R05.9 COUGH: Primary | ICD-10-CM

## 2022-05-02 PROCEDURE — 99213 OFFICE O/P EST LOW 20 MIN: CPT | Performed by: NURSE PRACTITIONER

## 2022-05-02 RX ORDER — GUAIFENESIN AND CODEINE PHOSPHATE 100; 10 MG/5ML; MG/5ML
5-10 SOLUTION ORAL 3 TIMES DAILY PRN
Qty: 180 ML | Refills: 0 | Status: SHIPPED | OUTPATIENT
Start: 2022-05-02 | End: 2022-07-08

## 2022-05-02 RX ORDER — PREDNISONE 20 MG/1
TABLET ORAL
Qty: 10 TABLET | Refills: 0 | Status: SHIPPED | OUTPATIENT
Start: 2022-05-02 | End: 2022-05-12

## 2022-05-02 NOTE — PROGRESS NOTES
Subjective   Genanupama Melchor is a 66 y.o. female.     Chief Complaint   Patient presents with   • Cough       History of Present Illness     Cough-for about 4-5 days.  She reports that she had some sneezing that has improved.  She reports no sore throat.  No ear pain.  No headache.  Cough is dry in nature.  PND is present.  No fever.  Some mild SOA.  No appetite changes.  She has taken Nyquil at home.  She reports not been helpful.  She reports cough is worse when she tries to lay down.  No underlying breathing problems.      The following portions of the patient's history were reviewed and updated as appropriate: CC, ROS, allergies, current medications, past family history, past medical history, past social history, past surgical history and problem list.      Review of Systems   Constitutional: Positive for fatigue. Negative for activity change, appetite change and fever.   HENT: Positive for sneezing. Negative for congestion, ear pain, nosebleeds, postnasal drip, rhinorrhea, sore throat, tinnitus, trouble swallowing and voice change.    Eyes: Negative for blurred vision, photophobia and visual disturbance.   Respiratory: Positive for cough. Negative for chest tightness, shortness of breath and wheezing.    Cardiovascular: Negative for chest pain, palpitations and leg swelling.   Gastrointestinal: Negative for abdominal pain, blood in stool, constipation, diarrhea, nausea, vomiting and GERD.   Endocrine: Negative for cold intolerance, heat intolerance and polydipsia.   Genitourinary: Negative for decreased urine volume, difficulty urinating, dysuria and hematuria.   Musculoskeletal: Negative for arthralgias, back pain, gait problem and myalgias.   Skin: Negative for color change, pallor and wound.   Allergic/Immunologic: Negative.    Neurological: Negative for dizziness, syncope, numbness and headache.   Hematological: Negative.    Psychiatric/Behavioral: Positive for sleep disturbance (due to cough). Negative for  "decreased concentration, self-injury, suicidal ideas and depressed mood. The patient is not nervous/anxious.    All other systems reviewed and are negative.      Objective     /72   Pulse 91   Temp 97.1 °F (36.2 °C)   Ht 165.1 cm (65\")   Wt 75.8 kg (167 lb 3.2 oz)   SpO2 97%   BMI 27.82 kg/m²     Physical Exam  Vitals reviewed.   Constitutional:       General: She is not in acute distress.     Appearance: Normal appearance. She is well-developed.   HENT:      Head: Normocephalic and atraumatic.      Comments: Oropharynx not examined.  Patient is presently wearing a face covering/mask due to COVID-19 pandemic.     Right Ear: Ear canal and external ear normal.      Left Ear: Tympanic membrane, ear canal and external ear normal.      Mouth/Throat:      Pharynx: Posterior oropharyngeal erythema (mild with clear PND noted) present.   Eyes:      General: No scleral icterus.     Pupils: Pupils are equal, round, and reactive to light.   Neck:      Thyroid: No thyromegaly.      Vascular: No JVD.   Cardiovascular:      Rate and Rhythm: Normal rate and regular rhythm.      Heart sounds: Normal heart sounds.   Pulmonary:      Effort: Pulmonary effort is normal.      Breath sounds: Normal breath sounds.   Abdominal:      General: Bowel sounds are normal.      Palpations: Abdomen is soft.      Tenderness: There is no abdominal tenderness.   Musculoskeletal:      Cervical back: Normal range of motion and neck supple.   Lymphadenopathy:      Cervical: Cervical adenopathy present.      Right cervical: Superficial cervical adenopathy present.      Left cervical: Superficial cervical adenopathy present.   Skin:     General: Skin is warm and dry.      Capillary Refill: Capillary refill takes less than 2 seconds.   Neurological:      Mental Status: She is alert and oriented to person, place, and time.      Cranial Nerves: No cranial nerve deficit.      Coordination: Coordination normal.      Gait: Gait normal.   Psychiatric: "         Behavior: Behavior normal.         Thought Content: Thought content normal.         Judgment: Judgment normal.       Assessment/Plan     Diagnoses and all orders for this visit:    1. Cough (Primary)  -     guaiFENesin-codeine (G Tussin AC) 100-10 MG/5ML liquid; Take 5-10 mL by mouth 3 (Three) Times a Day As Needed for Cough.  Dispense: 180 mL; Refill: 0  -     predniSONE (DELTASONE) 20 MG tablet; 2 daily  Dispense: 10 tablet; Refill: 0           Understands disease processes and need for medications.  Understands reasons for urgent and emergent care.  Patient (& family) verbalized agreement for treatment plan.   Emotional support and active listening provided.  Patient provided time to verbalize feelings.  Steroids for home.  Understands risk for lower immune response and to avoid others who are sick.   Understands increased risk for sunburn with prolonged exposure or tanning.  Understands may have facial flushing and possible jitteriness.  Take early in the AM.     Patient to report if she is not feeling better.     RTC PRN 3-5 days for worsening or non resolving symptoms            This document has been electronically signed by:  GUILLERMO Camp, FNP-C    Dragon disclaimer:  Part of this note may be an electronic transcription/translation of spoken language to printed text using the Dragon Dictation System.

## 2022-07-08 ENCOUNTER — OFFICE VISIT (OUTPATIENT)
Dept: FAMILY MEDICINE CLINIC | Facility: CLINIC | Age: 67
End: 2022-07-08

## 2022-07-08 VITALS
BODY MASS INDEX: 27.49 KG/M2 | OXYGEN SATURATION: 100 % | HEART RATE: 77 BPM | DIASTOLIC BLOOD PRESSURE: 80 MMHG | TEMPERATURE: 97.5 F | WEIGHT: 165 LBS | SYSTOLIC BLOOD PRESSURE: 124 MMHG | HEIGHT: 65 IN

## 2022-07-08 DIAGNOSIS — Z12.11 SCREENING FOR COLON CANCER: ICD-10-CM

## 2022-07-08 DIAGNOSIS — R61 HYPERHIDROSIS: Primary | Chronic | ICD-10-CM

## 2022-07-08 DIAGNOSIS — E03.4 HYPOTHYROIDISM DUE TO ACQUIRED ATROPHY OF THYROID: Chronic | ICD-10-CM

## 2022-07-08 DIAGNOSIS — E78.2 MIXED HYPERLIPIDEMIA: Chronic | ICD-10-CM

## 2022-07-08 PROCEDURE — 99214 OFFICE O/P EST MOD 30 MIN: CPT | Performed by: PHYSICIAN ASSISTANT

## 2022-07-08 RX ORDER — GLYCOPYRROLATE 1 MG/1
1 TABLET ORAL DAILY
Qty: 30 TABLET | Refills: 3 | Status: SHIPPED | OUTPATIENT
Start: 2022-07-08 | End: 2022-07-28

## 2022-07-08 RX ORDER — IBUPROFEN 600 MG/1
TABLET ORAL
COMMUNITY
Start: 2022-06-07

## 2022-07-08 NOTE — PROGRESS NOTES
"Subjective   Genople Jill is a 66 y.o. female.       Chief Complaint -excessive sweating    History of Present Illness -    ROS    Excessive sweating-  Patient complains of excessive sweating in multiple areas of her body including her arms, axilla, back and head.  She reports excessive sweating is profusion in the warm months including summer and does not occur in the winter months.  Onset greater than 1 year    Hyperlipidemia-stable with rosuvastatin    Hypothyroidism-stable with Synthroid 100 mcg daily    The following portions of the patient's history were reviewed and updated as appropriate: allergies, current medications, past family history, past medical history, past social history, past surgical history and problem list.    Review of Systems    Objective  Vital signs:  /80   Pulse 77   Temp 97.5 °F (36.4 °C) (Temporal)   Ht 165.1 cm (65\")   Wt 74.8 kg (165 lb)   SpO2 100%   BMI 27.46 kg/m²     Physical Exam  Vitals and nursing note reviewed.   Constitutional:       Appearance: Normal appearance. She is well-developed.   Eyes:      Extraocular Movements: Extraocular movements intact.      Conjunctiva/sclera: Conjunctivae normal.   Cardiovascular:      Rate and Rhythm: Normal rate and regular rhythm.      Heart sounds: Normal heart sounds. No murmur heard.  Pulmonary:      Effort: Pulmonary effort is normal. No respiratory distress.      Breath sounds: Normal breath sounds. No wheezing.   Musculoskeletal:         General: No tenderness.   Skin:     General: Skin is warm and dry.      Findings: No rash.      Comments: Palms noted to be sweaty today bilaterally   Neurological:      Mental Status: She is alert and oriented to person, place, and time.   Psychiatric:         Mood and Affect: Mood normal.         Behavior: Behavior normal.         Thought Content: Thought content normal.         The following data was reviewed by: EDD Vargas on 07/08/2022:  CMP    CMP 7/30/21   Glucose 99 "   BUN 26 (A)   Creatinine 0.95   eGFR Non African Am 59 (A)   Sodium 139   Potassium 4.1   Chloride 103   Calcium 9.3   Albumin 4.40   Total Bilirubin 0.4   Alkaline Phosphatase 63   AST (SGOT) 19   ALT (SGPT) 17   (A) Abnormal value              Lipid Panel    Lipid Panel 7/30/21   Total Cholesterol 174   Triglycerides 75   HDL Cholesterol 70 (A)   VLDL Cholesterol 14   LDL Cholesterol  90   LDL/HDL Ratio 1.27   (A) Abnormal value            TSH    TSH 7/30/21   TSH 0.676                  Assessment & Plan     Diagnoses and all orders for this visit:    1. Hyperhidrosis (Primary)  Comments:  Start Rosibel   Discussed that she may not need this in the winter months when sweating is less profuse  Potential side effects discussed  Orders:  -     glycopyrrolate (Robinul) 1 MG tablet; Take 1 tablet by mouth Daily.  Dispense: 30 tablet; Refill: 3    2. Screening for colon cancer  -     Cologuard - Stool, Per Rectum; Future    3. Mixed hyperlipidemia  Comments:  Continue rosuvastatin  Advised low-cholesterol diet    4. Hypothyroidism due to acquired atrophy of thyroid  Comments:  Continue Synthroid 100 mcg daily  Continue to monitor            Patient was given instructions and counseling regarding his condition or for health maintenance advice. Please see specific information pulled into the AVS if appropriate      This document has been electronically signed by:  Paulette Wallis PA-C

## 2022-07-28 ENCOUNTER — OFFICE VISIT (OUTPATIENT)
Dept: FAMILY MEDICINE CLINIC | Facility: CLINIC | Age: 67
End: 2022-07-28

## 2022-07-28 VITALS
HEART RATE: 79 BPM | HEIGHT: 65 IN | OXYGEN SATURATION: 100 % | BODY MASS INDEX: 27.16 KG/M2 | WEIGHT: 163 LBS | DIASTOLIC BLOOD PRESSURE: 66 MMHG | TEMPERATURE: 97.1 F | SYSTOLIC BLOOD PRESSURE: 104 MMHG

## 2022-07-28 DIAGNOSIS — Z12.31 ENCOUNTER FOR SCREENING MAMMOGRAM FOR MALIGNANT NEOPLASM OF BREAST: ICD-10-CM

## 2022-07-28 DIAGNOSIS — E03.4 HYPOTHYROIDISM DUE TO ACQUIRED ATROPHY OF THYROID: Chronic | ICD-10-CM

## 2022-07-28 DIAGNOSIS — R61 HYPERHIDROSIS: Primary | Chronic | ICD-10-CM

## 2022-07-28 DIAGNOSIS — E78.2 MIXED HYPERLIPIDEMIA: Chronic | ICD-10-CM

## 2022-07-28 DIAGNOSIS — Z78.0 MENOPAUSE: ICD-10-CM

## 2022-07-28 PROCEDURE — 99214 OFFICE O/P EST MOD 30 MIN: CPT | Performed by: PHYSICIAN ASSISTANT

## 2022-07-28 RX ORDER — GLYCOPYRROLATE 2 MG/1
2 TABLET ORAL DAILY
Qty: 30 TABLET | Refills: 3 | Status: SHIPPED | OUTPATIENT
Start: 2022-07-28 | End: 2022-09-26 | Stop reason: SDUPTHER

## 2022-07-28 NOTE — PROGRESS NOTES
"Subjective   Genople Jill is a 66 y.o. female.       Chief Complaint -excessive sweating    History of Present Illness -    ROS    Excessive sweating-  Patient reports some mild improvement of hyperhidrosis with use of Robinul 1 mg daily.  Symptoms not fully controlled.    Hyperlipidemia-stable with fenofibrate, rosuvastatin, and low-cholesterol diet    hypothyroidism-stable with Synthroid 100 mcg daily    The following portions of the patient's history were reviewed and updated as appropriate: allergies, current medications, past family history, past medical history, past social history, past surgical history and problem list.    Review of Systems    Objective  Vital signs:  /66   Pulse 79   Temp 97.1 °F (36.2 °C) (Temporal)   Ht 165.1 cm (65\")   Wt 73.9 kg (163 lb)   SpO2 100%   BMI 27.12 kg/m²     Physical Exam  Vitals and nursing note reviewed.   Constitutional:       Appearance: Normal appearance. She is well-developed.   Eyes:      Extraocular Movements: Extraocular movements intact.      Conjunctiva/sclera: Conjunctivae normal.   Cardiovascular:      Rate and Rhythm: Normal rate and regular rhythm.      Heart sounds: Normal heart sounds. No murmur heard.  Pulmonary:      Effort: Pulmonary effort is normal. No respiratory distress.      Breath sounds: Normal breath sounds. No wheezing.   Musculoskeletal:         General: No tenderness.   Skin:     General: Skin is warm and dry.      Findings: No rash.      Comments: Patient skin noted to be moist and clammy   Neurological:      Mental Status: She is alert and oriented to person, place, and time.   Psychiatric:         Mood and Affect: Mood normal.         Behavior: Behavior normal.         Thought Content: Thought content normal.         The following data was reviewed by: EDD Vargas on 07/28/2022:      Lab Results   Component Value Date    TSH 0.676 07/30/2021     Lab Results   Component Value Date    GLUCOSE 99 07/30/2021    BUN 26 " (H) 07/30/2021    CREATININE 0.95 07/30/2021    EGFRIFNONA 59 (L) 07/30/2021    BCR 27.4 (H) 07/30/2021    K 4.1 07/30/2021    CO2 26.8 07/30/2021    CALCIUM 9.3 07/30/2021    ALBUMIN 4.40 07/30/2021    AST 19 07/30/2021    ALT 17 07/30/2021     Lab Results   Component Value Date    CHOL 174 07/30/2021    CHOL 162 06/29/2020    CHOL 171 05/31/2019     Lab Results   Component Value Date    TRIG 75 07/30/2021    TRIG 79 06/29/2020    TRIG 63 05/31/2019     Lab Results   Component Value Date    HDL 70 (H) 07/30/2021    HDL 67 (H) 06/29/2020    HDL 71 (H) 05/31/2019     Lab Results   Component Value Date    LDL 90 07/30/2021    LDL 79 06/29/2020    LDL 87 05/31/2019                      Assessment & Plan     Diagnoses and all orders for this visit:    1. Hyperhidrosis (Primary)  Comments:  Increase Robinul 2 mg daily  Advised conservative measures including layering of clothing and use of fans  Orders:  -     glycopyrrolate (Robinul-Forte) 2 MG tablet; Take 1 tablet by mouth Daily.  Dispense: 30 tablet; Refill: 3    2. Menopause  -     DEXA Bone Density Axial; Future    3. Encounter for screening mammogram for malignant neoplasm of breast  -     Mammo Screening Digital Tomosynthesis Bilateral With CAD; Future    4. Mixed hyperlipidemia  Comments:  Advised low-cholesterol diet  Continue rosuvastatin and fenofibrate    5. Hypothyroidism due to acquired atrophy of thyroid  Comments:  Continue Synthroid 100 mcg daily            Patient was given instructions and counseling regarding his condition or for health maintenance advice. Please see specific information pulled into the AVS if appropriate      This document has been electronically signed by:  Paulette Wallis PA-C

## 2022-07-29 PROBLEM — R61 HYPERHIDROSIS: Chronic | Status: ACTIVE | Noted: 2022-07-29

## 2022-08-04 ENCOUNTER — HOSPITAL ENCOUNTER (OUTPATIENT)
Dept: BONE DENSITY | Facility: HOSPITAL | Age: 67
Discharge: HOME | End: 2022-08-04

## 2022-08-04 ENCOUNTER — HOSPITAL ENCOUNTER (OUTPATIENT)
Dept: MAMMOGRAPHY | Facility: HOSPITAL | Age: 67
Discharge: HOME | End: 2022-08-04

## 2022-08-04 DIAGNOSIS — Z78.0 MENOPAUSE: ICD-10-CM

## 2022-08-04 DIAGNOSIS — Z12.31 ENCOUNTER FOR SCREENING MAMMOGRAM FOR MALIGNANT NEOPLASM OF BREAST: ICD-10-CM

## 2022-08-04 PROCEDURE — 77080 DXA BONE DENSITY AXIAL: CPT

## 2022-08-04 PROCEDURE — 77063 BREAST TOMOSYNTHESIS BI: CPT | Performed by: RADIOLOGY

## 2022-08-04 PROCEDURE — 77080 DXA BONE DENSITY AXIAL: CPT | Performed by: RADIOLOGY

## 2022-08-04 PROCEDURE — 77063 BREAST TOMOSYNTHESIS BI: CPT

## 2022-08-04 PROCEDURE — 77067 SCR MAMMO BI INCL CAD: CPT

## 2022-08-04 PROCEDURE — 77067 SCR MAMMO BI INCL CAD: CPT | Performed by: RADIOLOGY

## 2022-09-02 ENCOUNTER — LAB (OUTPATIENT)
Dept: FAMILY MEDICINE CLINIC | Facility: CLINIC | Age: 67
End: 2022-09-02

## 2022-09-02 DIAGNOSIS — F41.1 GENERALIZED ANXIETY DISORDER: Chronic | ICD-10-CM

## 2022-09-02 DIAGNOSIS — I10 BENIGN ESSENTIAL HYPERTENSION: ICD-10-CM

## 2022-09-02 DIAGNOSIS — E03.4 HYPOTHYROIDISM DUE TO ACQUIRED ATROPHY OF THYROID: ICD-10-CM

## 2022-09-02 DIAGNOSIS — E78.2 MIXED HYPERLIPIDEMIA: ICD-10-CM

## 2022-09-02 LAB
ALBUMIN SERPL-MCNC: 4.2 G/DL (ref 3.5–5.2)
ALBUMIN/GLOB SERPL: 2.1 G/DL
ALP SERPL-CCNC: 54 U/L (ref 39–117)
ALT SERPL W P-5'-P-CCNC: 18 U/L (ref 1–33)
ANION GAP SERPL CALCULATED.3IONS-SCNC: 8.7 MMOL/L (ref 5–15)
AST SERPL-CCNC: 19 U/L (ref 1–32)
BILIRUB SERPL-MCNC: 0.3 MG/DL (ref 0–1.2)
BUN SERPL-MCNC: 23 MG/DL (ref 8–23)
BUN/CREAT SERPL: 24.2 (ref 7–25)
CALCIUM SPEC-SCNC: 9.5 MG/DL (ref 8.6–10.5)
CHLORIDE SERPL-SCNC: 105 MMOL/L (ref 98–107)
CHOLEST SERPL-MCNC: 135 MG/DL (ref 0–200)
CO2 SERPL-SCNC: 26.3 MMOL/L (ref 22–29)
CREAT SERPL-MCNC: 0.95 MG/DL (ref 0.57–1)
EGFRCR SERPLBLD CKD-EPI 2021: 65.4 ML/MIN/1.73
GLOBULIN UR ELPH-MCNC: 2 GM/DL
GLUCOSE SERPL-MCNC: 83 MG/DL (ref 65–99)
HDLC SERPL-MCNC: 58 MG/DL (ref 40–60)
LDLC SERPL CALC-MCNC: 63 MG/DL (ref 0–100)
LDLC/HDLC SERPL: 1.09 {RATIO}
POTASSIUM SERPL-SCNC: 4.3 MMOL/L (ref 3.5–5.2)
PROT SERPL-MCNC: 6.2 G/DL (ref 6–8.5)
SODIUM SERPL-SCNC: 140 MMOL/L (ref 136–145)
T4 FREE SERPL-MCNC: 1.83 NG/DL (ref 0.93–1.7)
TRIGL SERPL-MCNC: 69 MG/DL (ref 0–150)
TSH SERPL DL<=0.05 MIU/L-ACNC: 0.24 UIU/ML (ref 0.27–4.2)
VLDLC SERPL-MCNC: 14 MG/DL (ref 5–40)

## 2022-09-02 PROCEDURE — 80061 LIPID PANEL: CPT | Performed by: PHYSICIAN ASSISTANT

## 2022-09-02 PROCEDURE — 80053 COMPREHEN METABOLIC PANEL: CPT | Performed by: PHYSICIAN ASSISTANT

## 2022-09-02 PROCEDURE — 84439 ASSAY OF FREE THYROXINE: CPT | Performed by: PHYSICIAN ASSISTANT

## 2022-09-02 PROCEDURE — 84443 ASSAY THYROID STIM HORMONE: CPT | Performed by: PHYSICIAN ASSISTANT

## 2022-09-26 ENCOUNTER — OFFICE VISIT (OUTPATIENT)
Dept: FAMILY MEDICINE CLINIC | Facility: CLINIC | Age: 67
End: 2022-09-26

## 2022-09-26 VITALS
HEIGHT: 65 IN | HEART RATE: 80 BPM | DIASTOLIC BLOOD PRESSURE: 72 MMHG | TEMPERATURE: 96.9 F | SYSTOLIC BLOOD PRESSURE: 110 MMHG | WEIGHT: 158 LBS | OXYGEN SATURATION: 99 % | BODY MASS INDEX: 26.33 KG/M2

## 2022-09-26 DIAGNOSIS — R61 HYPERHIDROSIS: Chronic | ICD-10-CM

## 2022-09-26 DIAGNOSIS — E03.4 HYPOTHYROIDISM DUE TO ACQUIRED ATROPHY OF THYROID: Chronic | ICD-10-CM

## 2022-09-26 DIAGNOSIS — I10 BENIGN ESSENTIAL HYPERTENSION: Chronic | ICD-10-CM

## 2022-09-26 DIAGNOSIS — K59.04 CHRONIC IDIOPATHIC CONSTIPATION: Chronic | ICD-10-CM

## 2022-09-26 DIAGNOSIS — K21.9 GASTROESOPHAGEAL REFLUX DISEASE WITHOUT ESOPHAGITIS: Chronic | ICD-10-CM

## 2022-09-26 DIAGNOSIS — F41.1 GENERALIZED ANXIETY DISORDER: Chronic | ICD-10-CM

## 2022-09-26 DIAGNOSIS — N95.9 MENOPAUSAL AND POSTMENOPAUSAL DISORDER: Chronic | ICD-10-CM

## 2022-09-26 DIAGNOSIS — Z23 ENCOUNTER FOR IMMUNIZATION: ICD-10-CM

## 2022-09-26 DIAGNOSIS — F51.01 PRIMARY INSOMNIA: Chronic | ICD-10-CM

## 2022-09-26 DIAGNOSIS — Z00.00 MEDICARE ANNUAL WELLNESS VISIT, SUBSEQUENT: Primary | ICD-10-CM

## 2022-09-26 DIAGNOSIS — F32.9 MAJOR DEPRESSION, CHRONIC: Chronic | ICD-10-CM

## 2022-09-26 DIAGNOSIS — E78.2 MIXED HYPERLIPIDEMIA: Chronic | ICD-10-CM

## 2022-09-26 PROBLEM — N28.9 RENAL INSUFFICIENCY: Status: RESOLVED | Noted: 2021-08-06 | Resolved: 2022-09-26

## 2022-09-26 PROCEDURE — 1159F MED LIST DOCD IN RCRD: CPT | Performed by: PHYSICIAN ASSISTANT

## 2022-09-26 PROCEDURE — 1170F FXNL STATUS ASSESSED: CPT | Performed by: PHYSICIAN ASSISTANT

## 2022-09-26 PROCEDURE — 99214 OFFICE O/P EST MOD 30 MIN: CPT | Performed by: PHYSICIAN ASSISTANT

## 2022-09-26 PROCEDURE — 1126F AMNT PAIN NOTED NONE PRSNT: CPT | Performed by: PHYSICIAN ASSISTANT

## 2022-09-26 PROCEDURE — G0008 ADMIN INFLUENZA VIRUS VAC: HCPCS | Performed by: PHYSICIAN ASSISTANT

## 2022-09-26 PROCEDURE — 90686 IIV4 VACC NO PRSV 0.5 ML IM: CPT | Performed by: PHYSICIAN ASSISTANT

## 2022-09-26 PROCEDURE — G0439 PPPS, SUBSEQ VISIT: HCPCS | Performed by: PHYSICIAN ASSISTANT

## 2022-09-26 RX ORDER — GLYCOPYRROLATE 2 MG/1
2 TABLET ORAL DAILY
Qty: 90 TABLET | Refills: 1 | Status: SHIPPED | OUTPATIENT
Start: 2022-09-26 | End: 2023-02-24

## 2022-09-26 RX ORDER — LEVOTHYROXINE SODIUM 88 UG/1
88 TABLET ORAL DAILY
Qty: 90 TABLET | Refills: 1 | Status: SHIPPED | OUTPATIENT
Start: 2022-09-26 | End: 2022-12-30

## 2022-09-26 RX ORDER — POLYETHYLENE GLYCOL 3350 17 G/17G
17 POWDER, FOR SOLUTION ORAL DAILY
Qty: 289 G | Refills: 3 | Status: SHIPPED | OUTPATIENT
Start: 2022-09-26 | End: 2022-11-28

## 2022-09-26 NOTE — PROGRESS NOTES
The ABCs of the Annual Wellness Visit  Subsequent Medicare Wellness Visit    Chief Complaint   Patient presents with   • Medicare Wellness-subsequent      Subjective    History of Present Illness:  June Melchor is a 67 y.o. female who presents for a Subsequent Medicare Wellness Visit.    Constipation-  Patient complains of constipation with straining and hard stools intermittently for several years.  She states that constipation began before taking Robinul.  She does not identify any specific dietary relationship to constipation.    Hyperhidrosis-  Controlled with use of Robinul    Hypothyroidism-  Not at goal as recent TSH was 0.244 with an elevated free T4 on Synthroid 100 mcg daily.  When questioned patient stated the constipation was present before this medication dosage.    Hyperlipidemia-controlled with rosuvastatin, fenofibrate, and low-cholesterol diet    Hypertension-controlled with losartan    Gastroesophageal reflux disease-stable with omeprazole    Menopausal disorder-stable with conservative measures    Depression/anxiety disorder/insomnia-stable with hydroxyzine and amitriptyline.  She denies any SI, HI, or hallucinations.    The following portions of the patient's history were reviewed and   updated as appropriate: allergies, current medications, past family history, past medical history, past social history, past surgical history and problem list.    Compared to one year ago, the patient feels her physical   health is better.    Compared to one year ago, the patient feels her mental   health is the same.    Recent Hospitalizations:  She was not admitted to the hospital during the last year.       Current Medical Providers:  Patient Care Team:  Paulette Wallis PA as PCP - General (Family Medicine)    Outpatient Medications Prior to Visit   Medication Sig Dispense Refill   • amitriptyline (ELAVIL) 100 MG tablet Take 1 tablet by mouth Every Night. 90 tablet 3   • fenofibrate (TRICOR) 145 MG  "tablet Take 1 tablet by mouth Daily. 90 tablet 3   • hydrOXYzine (ATARAX) 50 MG tablet Take 1 tablet by mouth 3 (Three) Times a Day. 270 tablet 3   • ibuprofen (ADVIL,MOTRIN) 600 MG tablet      • losartan (COZAAR) 100 MG tablet Take 1 tablet by mouth Daily. 90 tablet 3   • omeprazole (priLOSEC) 40 MG capsule Take 1 capsule by mouth Daily. 90 capsule 3   • rosuvastatin (CRESTOR) 20 MG tablet Take 1 tablet by mouth Every Night. 90 tablet 3   • glycopyrrolate (Robinul-Forte) 2 MG tablet Take 1 tablet by mouth Daily. 30 tablet 3   • levothyroxine (SYNTHROID, LEVOTHROID) 100 MCG tablet Take 1 tablet by mouth Daily. 90 tablet 3     No facility-administered medications prior to visit.       No opioid medication identified on active medication list. I have reviewed chart for other potential  high risk medication/s and harmful drug interactions in the elderly.          Aspirin is not on active medication list.  Aspirin use is not indicated based on review of current medical condition/s. Risk of harm outweighs potential benefits.  .    Patient Active Problem List   Diagnosis   • Hypothyroidism due to acquired atrophy of thyroid   • Hyperlipidemia   • Benign essential hypertension   • Gastroesophageal reflux disease without esophagitis   • Generalized anxiety disorder   • Primary insomnia   • Menopausal and postmenopausal disorder   • Major depression, chronic   • Family history of colon cancer   • Family history of polyps in the colon   • Hyperhidrosis     Advance Care Planning  Advance Directive is not on file.  ACP discussion was held with the patient during this visit. Patient does not have an advance directive, information provided.          Objective    Vitals:    09/26/22 1425   BP: 110/72   Pulse: 80   Temp: 96.9 °F (36.1 °C)   TempSrc: Temporal   SpO2: 99%   Weight: 71.7 kg (158 lb)   Height: 165.1 cm (65\")   PainSc: 0-No pain     Estimated body mass index is 26.29 kg/m² as calculated from the following:    Height as " "of this encounter: 165.1 cm (65\").    Weight as of this encounter: 71.7 kg (158 lb).    BMI is >= 25 and <30. (Overweight) The following options were offered after discussion;: nutrition counseling/recommendations      Does the patient have evidence of cognitive impairment? No    Physical Exam  Vitals and nursing note reviewed.   Constitutional:       Appearance: Normal appearance. She is well-developed.   HENT:      Head: Normocephalic and atraumatic.      Right Ear: Tympanic membrane normal.      Left Ear: Tympanic membrane normal.      Nose: Nose normal.      Mouth/Throat:      Mouth: Mucous membranes are moist.      Pharynx: No oropharyngeal exudate or posterior oropharyngeal erythema.   Eyes:      Extraocular Movements: Extraocular movements intact.      Conjunctiva/sclera: Conjunctivae normal.   Neck:      Thyroid: No thyromegaly.      Trachea: No tracheal deviation.   Cardiovascular:      Rate and Rhythm: Normal rate and regular rhythm.      Heart sounds: Normal heart sounds. No murmur heard.  Pulmonary:      Effort: Pulmonary effort is normal. No respiratory distress.      Breath sounds: Normal breath sounds. No wheezing.   Abdominal:      General: Bowel sounds are normal.      Palpations: Abdomen is soft.      Tenderness: There is no abdominal tenderness. There is no guarding.   Musculoskeletal:         General: No tenderness. Normal range of motion.      Cervical back: Normal range of motion and neck supple.   Lymphadenopathy:      Cervical: No cervical adenopathy.   Skin:     General: Skin is warm and dry.      Findings: No rash.   Neurological:      General: No focal deficit present.      Mental Status: She is alert and oriented to person, place, and time.   Psychiatric:         Mood and Affect: Mood normal.         Behavior: Behavior normal.         Thought Content: Thought content normal.       Lab Results   Component Value Date    TRIG 69 09/02/2022    HDL 58 09/02/2022    LDL 63 09/02/2022    VLDL 14 " 09/02/2022     Lab Results   Component Value Date    TSH 0.244 (L) 09/02/2022     Lab Results   Component Value Date    GLUCOSE 83 09/02/2022    BUN 23 09/02/2022    CREATININE 0.95 09/02/2022    EGFRIFNONA 59 (L) 07/30/2021    BCR 24.2 09/02/2022    K 4.3 09/02/2022    CO2 26.3 09/02/2022    CALCIUM 9.5 09/02/2022    ALBUMIN 4.20 09/02/2022    AST 19 09/02/2022    ALT 18 09/02/2022              HEALTH RISK ASSESSMENT    Smoking Status:  Social History     Tobacco Use   Smoking Status Former Smoker   Smokeless Tobacco Never Used     Alcohol Consumption:  Social History     Substance and Sexual Activity   Alcohol Use No     Fall Risk Screen:    MARIANADI Fall Risk Assessment was completed, and patient is at MODERATE risk for falls. Assessment completed on:9/26/2022    Depression Screening:  PHQ-2/PHQ-9 Depression Screening 9/26/2022   Retired PHQ-9 Total Score -   Retired Total Score -   Little Interest or Pleasure in Doing Things 0-->not at all   Feeling Down, Depressed or Hopeless 0-->not at all   PHQ-9: Brief Depression Severity Measure Score 0       Health Habits and Functional and Cognitive Screening:  Functional & Cognitive Status 9/26/2022   Do you have difficulty preparing food and eating? No   Do you have difficulty bathing yourself, getting dressed or grooming yourself? No   Do you have difficulty using the toilet? No   Do you have difficulty moving around from place to place? No   Do you have trouble with steps or getting out of a bed or a chair? No   Current Diet Well Balanced Diet   Dental Exam Up to date   Eye Exam Up to date   Exercise (times per week) 7 times per week   Current Exercises Include Walking;House Cleaning   Current Exercise Activities Include -   Do you need help using the phone?  No   Are you deaf or do you have serious difficulty hearing?  No   Do you need help with transportation? No   Do you need help shopping? No   Do you need help preparing meals?  No   Do you need help with housework?   No   Do you need help with laundry? No   Do you need help taking your medications? No   Do you need help managing money? No   Do you ever drive or ride in a car without wearing a seat belt? No   Have you felt unusual stress, anger or loneliness in the last month? No   Who do you live with? Alone   If you need help, do you have trouble finding someone available to you? No   Have you been bothered in the last four weeks by sexual problems? No   Do you have difficulty concentrating, remembering or making decisions? No       Age-appropriate Screening Schedule:  Refer to the list below for future screening recommendations based on patient's age, sex and/or medical conditions. Orders for these recommended tests are listed in the plan section. The patient has been provided with a written plan.    Health Maintenance   Topic Date Due   • ZOSTER VACCINE (2 of 2) 04/01/2016   • INFLUENZA VACCINE  10/01/2022   • MAMMOGRAM  08/04/2023   • LIPID PANEL  09/02/2023   • DXA SCAN  08/04/2024   • TDAP/TD VACCINES (2 - Td or Tdap) 02/09/2027   • PAP SMEAR  Discontinued              Assessment & Plan   CMS Preventative Services Quick Reference  Risk Factors Identified During Encounter  Cardiovascular Disease  Polypharmacy  The above risks/problems have been discussed with the patient.  Follow up actions/plans if indicated are seen below in the Assessment/Plan Section.  Pertinent information has been shared with the patient in the After Visit Summary.    Diagnoses and all orders for this visit:    1. Medicare annual wellness visit, subsequent (Primary)  Comments:  Performed and documented today    2. Encounter for immunization  -     FluLaval/Fluzone >6 mos ([id number])    3. Hyperhidrosis  Comments:  Continue Robinul 2 mg daily  Advised conservative measures including layering of clothing and use of fans  Orders:  -     glycopyrrolate (Robinul-Forte) 2 MG tablet; Take 1 tablet by mouth Daily.  Dispense: 90 tablet; Refill: 1    4.  Chronic idiopathic constipation  Comments:  Start MiraLAX twice daily as needed  Advised high-fiber diet  Orders:  -     polyethylene glycol (MiraLax) 17 GM/SCOOP powder; Take 17 g by mouth Daily.  Dispense: 289 g; Refill: 3  -     Comprehensive Metabolic Panel; Future    5. Hypothyroidism due to acquired atrophy of thyroid  Comments:  Decrease Synthroid 88 mcg daily  Recheck lab work in 8 weeks  Orders:  -     levothyroxine (Synthroid) 88 MCG tablet; Take 1 tablet by mouth Daily.  Dispense: 90 tablet; Refill: 1  -     Comprehensive Metabolic Panel; Future  -     TSH; Future  -     T4, Free; Future    6. Mixed hyperlipidemia  Comments:  Advised low-cholesterol diet  Continue rosuvastatin and fenofibrate    7. Benign essential hypertension  Comments:  Continue losartan  Advise daily BP and pulse monitoring    8. Gastroesophageal reflux disease without esophagitis  Comments:  Continue omeprazole  Advised to avoid known trigger foods    9. Menopausal and postmenopausal disorder  Comments:  Conservative measures advised    10. Generalized anxiety disorder  Comments:  Continue hydroxyzine as needed  Advised conservative measures for dealing with anxiety and depression    11. Major depression, chronic  Comments:  Continue amitriptyline    12. Primary insomnia  Comments:  Continue amitriptyline  Advised tips for healthy sleep    Other orders  -     Zoster Vac Recomb Adjuvanted 50 MCG/0.5ML reconstituted suspension; Inject 0.5 mL into the appropriate muscle as directed by prescriber 1 (One) Time for 1 dose.  Dispense: 1 each; Refill: 1    Patient declines colorectal cancer screening including Cologuard.    Follow Up:   Return in about 8 weeks (around 11/21/2022) for Followup with Regi Caldwell just prior to return.     An After Visit Summary and PPPS were made available to the patient.

## 2022-09-26 NOTE — PATIENT INSTRUCTIONS
Medicare Wellness  Personal Prevention Plan of Service     Date of Office Visit:    Encounter Provider:  EDD Vargas  Place of Service:  [facility]  Patient Name: June Melchor  :  1955    As part of the Medicare Wellness portion of your visit today, we are providing you with this personalized preventive plan of services (PPPS). This plan is based upon recommendations of the [place] Preventive Services Task Force (USPSTF) and the Advisory Committee on Immunization Practices (ACIP).    This lists the preventive care services that should be considered, and provides dates of when you are due. Items listed as completed are up-to-date and do not require any further intervention.    Health Maintenance   Topic Date Due    ZOSTER VACCINE (2 of 2) 2016    COLORECTAL CANCER SCREENING  2022    COVID-19 Vaccine (4 - Booster for Moderna series) 2022    INFLUENZA VACCINE  10/01/2022    ANNUAL WELLNESS VISIT  2023    MAMMOGRAM  2023    LIPID PANEL  2023    DXA SCAN  2024    TDAP/TD VACCINES (2 - Td or Tdap) 2027    HEPATITIS C SCREENING  Completed    Pneumococcal Vaccine 65+  Completed    PAP SMEAR  Discontinued       Orders Placed This Encounter   Procedures    FluLaval/Fluzone >6 mos (3014-1759)    Comprehensive Metabolic Panel     Standing Status:   Future     Standing Expiration Date:   2023     Order Specific Question:   Release to patient     Answer:   Routine Release    TSH     Standing Status:   Future     Standing Expiration Date:   2023     Order Specific Question:   Release to patient     Answer:   Routine Release    T4, Free     Standing Status:   Future     Standing Expiration Date:   2023     Order Specific Question:   Release to patient     Answer:   Routine Release       Return in about 8 weeks (around 2022) for Followup with Regi Caldwell just prior to return.        Advance  Directive  Advance directives are legal documents that allow you to make decisions about your health care and medical treatment in case you become unable to communicate for yourself. Advance directives let your wishes be known to family, friends, and health care providers.  Discussing and writing advance directives should happen over time rather than all at once. Advance directives can be changed and updated at any time. There are different types of advance directives, such as:  Medical power of .  Living will.  Do not resuscitate (DNR) order or do not attempt resuscitation (DNAR) order.  Health care proxy and medical power of   A health care proxy is also called a health care agent. This person is appointed to make medical decisions for you when you are unable to make decisions for yourself. Generally, people ask a trusted friend or family member to act as their proxy and represent their preferences. Make sure you have an agreement with your trusted person to act as your proxy. A proxy may have to make a medical decision on your behalf if your wishes are not known.  A medical power of , also called a durable power of  for health care, is a legal document that names your health care proxy. Depending on the laws in your state, the document may need to be:  Signed.  Notarized.  Dated.  Copied.  Witnessed.  Incorporated into your medical record.  You may also want to appoint a trusted person to manage your money in the event you are unable to do so. This is called a durable power of  for finances. It is a separate legal document from the durable power of  for health care. You may choose your health care proxy or someone different to act as your agent in money matters.  If you do not appoint a proxy, or there is a concern that the proxy is not acting in your best interest, a court may appoint a guardian to act on your behalf.  Living will  A living will is a set of  instructions that state your wishes about medical care when you cannot express them yourself. Health care providers should keep a copy of your living will in your medical record. You may want to give a copy to family members or friends. To alert caregivers in case of an emergency, you can place a card in your wallet to let them know that you have a living will and where they can find it. A living will is used if you become:  Terminally ill.  Disabled.  Unable to communicate or make decisions.  The following decisions should be included in your living will:  To use or not to use life support equipment, such as dialysis machines and breathing machines (ventilators).  Whether you want a DNR or DNAR order. This tells health care providers not to use cardiopulmonary resuscitation (CPR) if breathing or heartbeat stops.  To use or not to use tube feeding.  To be given or not to be given food and fluids.  Whether you want comfort (palliative) care when the goal becomes comfort rather than a cure.  Whether you want to donate your organs and tissues.  A living will does not give instructions for distributing your money and property if you should pass away.  DNR or DNAR  A DNR or DNAR order is a request not to have CPR in the event that your heart stops beating or you stop breathing. If a DNR or DNAR order has not been made and shared, a health care provider will try to help any patient whose heart has stopped or who has stopped breathing. If you plan to have surgery, talk with your health care provider about how your DNR or DNAR order will be followed if problems occur.  What if I do not have an advance directive?  Some states assign family decision makers to act on your behalf if you do not have an advance directive. Each state has its own laws about advance directives. You may want to check with your health care provider, , or state representative about the laws in your state.  Summary  Advance directives are legal  documents that allow you to make decisions about your health care and medical treatment in case you become unable to communicate for yourself.  The process of discussing and writing advance directives should happen over time. You can change and update advance directives at any time.  Advance directives may include a medical power of , a living will, and a DNR or DNAR order.  This information is not intended to replace advice given to you by your health care provider. Make sure you discuss any questions you have with your health care provider.  Document Revised: 09/21/2021 Document Reviewed: 09/21/2021  [employer] Patient Education © 2022 [employer]  Fat and Cholesterol Restricted Eating Plan  Getting too much fat and cholesterol in your diet may cause health problems. Choosing the right foods helps keep your fat and cholesterol at normal levels. This can keep you from getting certain diseases.  Your doctor may recommend an eating plan that includes:  Total fat: ______% or less of total calories a day. This is ______g of fat a day.  Saturated fat: ______% or less of total calories a day. This is ______g of saturated fat a day.  Cholesterol: less than _________mg a day.  Fiber: ______g a day.  What are tips for following this plan?  General tips  Work with your doctor to lose weight if you need to.  Avoid:  Foods with added sugar.  Fried foods.  Foods with trans fat or partially hydrogenated oils. This includes some margarines and baked goods.  If you drink alcohol:  Limit how much you have to:  0-1 drink a day for women who are not pregnant.  0-2 drinks a day for men.  Know how much alcohol is in a drink. In the [place] one drink equals one 12 oz bottle of beer (355 mL), one 5 oz glass of wine (148 mL), or one 1½ oz glass of hard liquor (44 mL).  Reading food labels  Check food labels for:  Trans fats.  Partially hydrogenated oils.  Saturated fat (g) in each serving.  Cholesterol (mg) in each serving.  Fiber  "(g) in each serving.  Choose foods with healthy fats, such as:  Monounsaturated fats and polyunsaturated fats. These include olive and canola oil, flaxseeds, walnuts, almonds, and seeds.  Omega-3 fats. These are found in certain fish, flaxseed oil, and ground flaxseeds.  Choose grain products that have whole grains. Look for the word \"whole\" as the first word in the ingredient list.  Cooking  Cook foods using low-fat methods. These include baking, boiling, grilling, and broiling.  Eat more home-cooked foods. Eat at restaurants and buffets less often. Eat less fast food.  Avoid cooking using saturated fats, such as butter, cream, palm oil, palm kernel oil, and coconut oil.  Meal planning    At meals, divide your plate into four equal parts:  Fill one-half of your plate with vegetables, green salads, and fruit.  Fill one-fourth of your plate with whole grains.  Fill one-fourth of your plate with low-fat (lean) protein foods.  Eat fish that is high in omega-3 fats at least two times a week. This includes mackerel, tuna, sardines, and salmon.  Eat foods that are high in fiber, such as whole grains, beans, apples, pears, berries, broccoli, carrots, peas, and barley.  What foods should I eat?  Fruits  All fresh, canned (in natural juice), or frozen fruits.  Vegetables  Fresh or frozen vegetables (raw, steamed, roasted, or grilled). Green salads.  Grains  Whole grains, such as whole wheat or whole grain breads, crackers, cereals, and pasta. Unsweetened oatmeal, bulgur, barley, quinoa, or brown rice. Corn or whole wheat flour tortillas.  Meats and other protein foods  Ground beef (85% or leaner), grass-fed beef, or beef trimmed of fat. Skinless chicken or turkey. Ground chicken or turkey. Pork trimmed of fat. All fish and seafood. Egg whites. Dried beans, peas, or lentils. Unsalted nuts or seeds. Unsalted canned beans. Nut butters without added sugar or oil.  Dairy  Low-fat or nonfat dairy products, such as skim or 1% milk, " 2% or reduced-fat cheeses, low-fat and fat-free ricotta or cottage cheese, or plain low-fat and nonfat yogurt.  Fats and oils  Tub margarine without trans fats. Light or reduced-fat mayonnaise and salad dressings. Avocado. Olive, canola, sesame, or safflower oils.  The items listed above may not be a complete list of foods and beverages you can eat. Contact a dietitian for more information.  What foods should I avoid?  Fruits  Canned fruit in heavy syrup. Fruit in cream or butter sauce. Fried fruit.  Vegetables  Vegetables cooked in cheese, cream, or butter sauce. Fried vegetables.  Grains  White bread. White pasta. White rice. Cornbread. Bagels, pastries, and croissants. Crackers and snack foods that contain trans fat and hydrogenated oils.  Meats and other protein foods  Fatty cuts of meat. Ribs, chicken wings, morfin, sausage, bologna, salami, chitterlings, fatback, hot dogs, bratwurst, and packaged lunch meats. Liver and organ meats. Whole eggs and egg yolks. Chicken and turkey with skin. Fried meat.  Dairy  Whole or 2% milk, cream, half-and-half, and cream cheese. Whole milk cheeses. Whole-fat or sweetened yogurt. Full-fat cheeses. Nondairy creamers and whipped toppings. Processed cheese, cheese spreads, and cheese curds.  Fats and oils  Butter, stick margarine, lard, shortening, ghee, or morfin fat. Coconut, palm kernel, and palm oils.  Beverages  Alcohol. Sugar-sweetened drinks such as sodas, lemonade, and fruit drinks.  Sweets and desserts  Corn syrup, sugars, honey, and molasses. Candy. Jam and jelly. Syrup. Sweetened cereals. Cookies, pies, cakes, donuts, muffins, and ice cream.  The items listed above may not be a complete list of foods and beverages you should avoid. Contact a dietitian for more information.  Summary  Choosing the right foods helps keep your fat and cholesterol at normal levels. This can keep you from getting certain diseases.  At meals, fill one-half of your plate with vegetables, green  salads, and fruits.  Eat high fiber foods, like whole grains, beans, apples, pears, berries, carrots, peas, and barley.  Limit added sugar, saturated fats, alcohol, and fried foods.  This information is not intended to replace advice given to you by your health care provider. Make sure you discuss any questions you have with your health care provider.  Document Revised: 04/29/2022 Document Reviewed: 04/29/2022  Elsevier Patient Education © 2022 [employer]  Fall Prevention in the Home, Adult  Falls can cause injuries and can happen to people of all ages. There are many things you can do to make your home safe and to help prevent falls. Ask for help when making these changes.  What actions can I take to prevent falls?  General Instructions  Use good lighting in all rooms. Replace any light bulbs that burn out.  Turn on the lights in dark areas. Use night-lights.  Keep items that you use often in easy-to-reach places. Lower the shelves around your home if needed.  Set up your furniture so you have a clear path. Avoid moving your furniture around.  Do not have throw rugs or other things on the floor that can make you trip.  Avoid walking on wet floors.  If any of your floors are uneven, fix them.  Add color or contrast paint or tape to clearly vance and help you see:  Grab bars or handrails.  First and last steps of staircases.  Where the edge of each step is.  If you use a stepladder:  Make sure that it is fully opened. Do not climb a closed stepladder.  Make sure the sides of the stepladder are locked in place.  Ask someone to hold the stepladder while you use it.  Know where your pets are when moving through your home.  What can I do in the bathroom?     Keep the floor dry. Clean up any water on the floor right away.  Remove soap buildup in the tub or shower.  Use nonskid mats or decals on the floor of the tub or shower.  Attach bath mats securely with double-sided, nonslip rug tape.  If you need to sit down in the  shower, use a plastic, nonslip stool.  Install grab bars by the toilet and in the tub and shower. Do not use towel bars as grab bars.  What can I do in the bedroom?  Make sure that you have a light by your bed that is easy to reach.  Do not use any sheets or blankets for your bed that hang to the floor.  Have a firm chair with side arms that you can use for support when you get dressed.  What can I do in the kitchen?  Clean up any spills right away.  If you need to reach something above you, use a step stool with a grab bar.  Keep electrical cords out of the way.  Do not use floor polish or wax that makes floors slippery.  What can I do with my stairs?  Do not leave any items on the stairs.  Make sure that you have a light switch at the top and the bottom of the stairs.  Make sure that there are handrails on both sides of the stairs. Fix handrails that are broken or loose.  Install nonslip stair treads on all your stairs.  Avoid having throw rugs at the top or bottom of the stairs.  Choose a carpet that does not hide the edge of the steps on the stairs.  Check carpeting to make sure that it is firmly attached to the stairs. Fix carpet that is loose or worn.  What can I do on the outside of my home?  Use bright outdoor lighting.  Fix the edges of walkways and driveways and fix any cracks.  Remove anything that might make you trip as you walk through a door, such as a raised step or threshold.  Trim any bushes or trees on paths to your home.  Check to see if handrails are loose or broken and that both sides of all steps have handrails.  Install guardrails along the edges of any raised decks and porches.  Clear paths of anything that can make you trip, such as tools or rocks.  Have leaves, snow, or ice cleared regularly.  Use sand or salt on paths during winter.  Clean up any spills in your garage right away. This includes grease or oil spills.  What other actions can I take?  Wear shoes that:  Have a low heel. Do not  wear high heels.  Have rubber bottoms.  Feel good on your feet and fit well.  Are closed at the toe. Do not wear open-toe sandals.  Use tools that help you move around if needed. These include:  Canes.  Walkers.  Scooters.  Crutches.  Review your medicines with your doctor. Some medicines can make you feel dizzy. This can increase your chance of falling.  Ask your doctor what else you can do to help prevent falls.  Where to find more information  Centers for Disease Control and PreventionJAMIE: www.cdc.gov  National [place] on Aging: www.elizabeth.nih.gov  Contact a doctor if:  You are afraid of falling at home.  You feel weak, drowsy, or dizzy at home.  You fall at home.  Summary  There are many simple things that you can do to make your home safe and to help prevent falls.  Ways to make your home safe include removing things that can make you trip and installing grab bars in the bathroom.  Ask for help when making these changes in your home.  This information is not intended to replace advice given to you by your health care provider. Make sure you discuss any questions you have with your health care provider.  Document Revised: 07/21/2021 Document Reviewed: 07/21/2021  Elsevier Patient Education © 2022 Elsevier Inc.

## 2022-11-02 NOTE — PROGRESS NOTES
"Subjective   Genople Jill is a 62 y.o. female.     Chief complaint-hypothyroidism    History of Present Illness     Hypothyroidism-  She complains of fatigue.  She is due to have thyroid levels rechecked.  Currently taking levothyroxine 100 µg daily    Primary insomnia-stable with trazodone daily at bedtime    Gastroesophageal reflux disease-stable with Zantac    Hypertension-well controlled with losartan 100 mg daily    Generalized anxiety disorder-stable with Vistaril 3 times a day when necessary    Hyperlipidemia-controlled with Crestor    The following portions of the patient's history were reviewed and updated as appropriate: allergies, current medications, past family history, past medical history, past social history, past surgical history and problem list.    Review of Systems   Constitutional: Positive for fatigue. Negative for activity change, appetite change and fever.   HENT: Negative for ear pain, sinus pressure and sore throat.    Eyes: Negative for pain and visual disturbance.   Respiratory: Negative for cough and chest tightness.    Cardiovascular: Negative for chest pain and palpitations.   Gastrointestinal: Negative for abdominal pain, constipation, diarrhea, nausea and vomiting.   Endocrine: Negative for polydipsia and polyuria.   Genitourinary: Negative for dysuria and frequency.   Musculoskeletal: Negative for back pain and myalgias.   Skin: Negative for color change and rash.   Allergic/Immunologic: Negative for food allergies and immunocompromised state.   Neurological: Negative for dizziness, syncope and headaches.   Hematological: Negative for adenopathy. Does not bruise/bleed easily.   Psychiatric/Behavioral: Negative for hallucinations and suicidal ideas. The patient is not nervous/anxious.        /60   Pulse 68   Temp 98.6 °F (37 °C) (Oral)   Ht 165.1 cm (65\")   Wt 87.1 kg (192 lb)   SpO2 99%   BMI 31.95 kg/m²   Lab on 07/03/2017   Component Date Value Ref Range Status   • " Glucose 07/03/2017 102  70 - 110 mg/dL Final   • BUN 07/03/2017 21  7 - 21 mg/dL Final   • Creatinine 07/03/2017 1.06  0.43 - 1.29 mg/dL Final   • Sodium 07/03/2017 139  135 - 153 mmol/L Final   • Potassium 07/03/2017 3.7  3.5 - 5.3 mmol/L Final   • Chloride 07/03/2017 103  99 - 112 mmol/L Final   • CO2 07/03/2017 31.3  24.3 - 31.9 mmol/L Final   • Calcium 07/03/2017 9.9  7.7 - 10.0 mg/dL Final   • Total Protein 07/03/2017 8.0  6.0 - 8.0 g/dL Final   • Albumin 07/03/2017 4.90* 3.40 - 4.80 g/dL Final   • ALT (SGPT) 07/03/2017 25  10 - 36 U/L Final   • AST (SGOT) 07/03/2017 25  10 - 30 U/L Final   • Alkaline Phosphatase 07/03/2017 93  35 - 104 U/L Final   • Total Bilirubin 07/03/2017 0.5  0.2 - 1.8 mg/dL Final   • eGFR Non African Amer 07/03/2017 52* >60 mL/min/1.73 Final   • Globulin 07/03/2017 3.1  gm/dL Final   • A/G Ratio 07/03/2017 1.6  1.5 - 2.5 g/dL Final   • BUN/Creatinine Ratio 07/03/2017 19.8  7.0 - 25.0 Final   • Anion Gap 07/03/2017 4.7  3.6 - 11.2 mmol/L Final   • Total Cholesterol 07/03/2017 208* 0 - 200 mg/dL Final   • Triglycerides 07/03/2017 157* 0 - 150 mg/dL Final   • HDL Cholesterol 07/03/2017 63  60 - 100 mg/dL Final   • LDL Cholesterol  07/03/2017 114* 0 - 100 mg/dL Final   • VLDL Cholesterol 07/03/2017 31.4  mg/dL Final   • LDL/HDL Ratio 07/03/2017 1.80   Final   • Hemoglobin A1C 07/03/2017 5.10  4.50 - 5.70 % Final   • TSH 07/03/2017 5.333* 0.550 - 4.780 mIU/mL Final   • Free T4 07/03/2017 1.30  0.89 - 1.76 ng/dL Final   • Osmolality Calc 07/03/2017 280.7  273.0 - 305.0 mOsm/kg Final       Physical Exam   Constitutional: She is oriented to person, place, and time. She appears well-developed and well-nourished.   HENT:   Head: Normocephalic and atraumatic.   Nose: Nose normal.   Mouth/Throat: Oropharynx is clear and moist.   Eyes: Conjunctivae and EOM are normal. Pupils are equal, round, and reactive to light.   Neck: Normal range of motion. Neck supple. No tracheal deviation present. No  thyromegaly present.   Cardiovascular: Normal rate, regular rhythm, normal heart sounds and intact distal pulses.    No murmur heard.  Pulmonary/Chest: Effort normal and breath sounds normal. No respiratory distress. She has no wheezes.   Abdominal: Soft. Bowel sounds are normal. There is no tenderness. There is no guarding.   Musculoskeletal: Normal range of motion. She exhibits no edema or tenderness.   Lymphadenopathy:     She has no cervical adenopathy.   Neurological: She is alert and oriented to person, place, and time.   Skin: Skin is warm and dry. No rash noted.   Psychiatric: She has a normal mood and affect. Her behavior is normal.   Nursing note and vitals reviewed.      Assessment/Plan     Diagnoses and all orders for this visit:    Hypothyroidism due to acquired atrophy of thyroid  -     levothyroxine (SYNTHROID, LEVOTHROID) 100 MCG tablet; Take 1 tablet by mouth Daily.  -     TSH  -     T4, Free    Primary insomnia  -     traZODone (DESYREL) 100 MG tablet; Take 2 tablets by mouth Every Night.    Mixed hyperlipidemia  -     rosuvastatin (CRESTOR) 20 MG tablet; Take 1 tablet by mouth Every Night.  -     CBC & Differential  -     Comprehensive Metabolic Panel  -     Lipid Panel  -     CBC Auto Differential    Gastroesophageal reflux disease without esophagitis  -     raNITIdine (ZANTAC) 150 MG tablet; Take 2 tablets by mouth 2 (Two) Times a Day.    Benign essential hypertension  -     losartan (COZAAR) 100 MG tablet; Take 1 tablet by mouth Daily.    Generalized anxiety disorder  -     hydrOXYzine (VISTARIL) 50 MG capsule; Take 1 capsule by mouth 3 (Three) Times a Day As Needed for Itching.      5/9/2018 mammogram results reviewed and discussed with the patient as benign           This document has been electronically signed by:  Paulette Wallis PA-C   Calcipotriene Counseling:  I discussed with the patient the risks of calcipotriene including but not limited to erythema, scaling, itching, and irritation.

## 2022-11-18 ENCOUNTER — LAB (OUTPATIENT)
Dept: FAMILY MEDICINE CLINIC | Facility: CLINIC | Age: 67
End: 2022-11-18

## 2022-11-18 DIAGNOSIS — E03.4 HYPOTHYROIDISM DUE TO ACQUIRED ATROPHY OF THYROID: Chronic | ICD-10-CM

## 2022-11-18 DIAGNOSIS — K59.04 CHRONIC IDIOPATHIC CONSTIPATION: Chronic | ICD-10-CM

## 2022-11-18 LAB
ALBUMIN SERPL-MCNC: 4.3 G/DL (ref 3.5–5.2)
ALBUMIN/GLOB SERPL: 1.5 G/DL
ALP SERPL-CCNC: 65 U/L (ref 39–117)
ALT SERPL W P-5'-P-CCNC: 18 U/L (ref 1–33)
ANION GAP SERPL CALCULATED.3IONS-SCNC: 8.4 MMOL/L (ref 5–15)
AST SERPL-CCNC: 21 U/L (ref 1–32)
BILIRUB SERPL-MCNC: 0.5 MG/DL (ref 0–1.2)
BUN SERPL-MCNC: 23 MG/DL (ref 8–23)
BUN/CREAT SERPL: 19.5 (ref 7–25)
CALCIUM SPEC-SCNC: 10 MG/DL (ref 8.6–10.5)
CHLORIDE SERPL-SCNC: 103 MMOL/L (ref 98–107)
CO2 SERPL-SCNC: 27.6 MMOL/L (ref 22–29)
CREAT SERPL-MCNC: 1.18 MG/DL (ref 0.57–1)
EGFRCR SERPLBLD CKD-EPI 2021: 50.1 ML/MIN/1.73
GLOBULIN UR ELPH-MCNC: 2.8 GM/DL
GLUCOSE SERPL-MCNC: 89 MG/DL (ref 65–99)
POTASSIUM SERPL-SCNC: 4.2 MMOL/L (ref 3.5–5.2)
PROT SERPL-MCNC: 7.1 G/DL (ref 6–8.5)
SODIUM SERPL-SCNC: 139 MMOL/L (ref 136–145)
T4 FREE SERPL-MCNC: 1.22 NG/DL (ref 0.93–1.7)
TSH SERPL DL<=0.05 MIU/L-ACNC: 6.08 UIU/ML (ref 0.27–4.2)

## 2022-11-18 PROCEDURE — 84439 ASSAY OF FREE THYROXINE: CPT | Performed by: PHYSICIAN ASSISTANT

## 2022-11-18 PROCEDURE — 84443 ASSAY THYROID STIM HORMONE: CPT | Performed by: PHYSICIAN ASSISTANT

## 2022-11-18 PROCEDURE — 80053 COMPREHEN METABOLIC PANEL: CPT | Performed by: PHYSICIAN ASSISTANT

## 2022-11-28 ENCOUNTER — OFFICE VISIT (OUTPATIENT)
Dept: FAMILY MEDICINE CLINIC | Facility: CLINIC | Age: 67
End: 2022-11-28

## 2022-11-28 VITALS
HEIGHT: 65 IN | TEMPERATURE: 97.3 F | OXYGEN SATURATION: 98 % | SYSTOLIC BLOOD PRESSURE: 112 MMHG | HEART RATE: 81 BPM | WEIGHT: 152 LBS | BODY MASS INDEX: 25.33 KG/M2 | DIASTOLIC BLOOD PRESSURE: 70 MMHG

## 2022-11-28 DIAGNOSIS — K21.9 GASTROESOPHAGEAL REFLUX DISEASE WITHOUT ESOPHAGITIS: Primary | Chronic | ICD-10-CM

## 2022-11-28 DIAGNOSIS — D48.5 NEOPLASM OF UNCERTAIN BEHAVIOR OF SKIN OF BREAST: ICD-10-CM

## 2022-11-28 DIAGNOSIS — K59.09 CHRONIC CONSTIPATION: Chronic | ICD-10-CM

## 2022-11-28 DIAGNOSIS — E03.4 HYPOTHYROIDISM DUE TO ACQUIRED ATROPHY OF THYROID: Chronic | ICD-10-CM

## 2022-11-28 PROCEDURE — 99214 OFFICE O/P EST MOD 30 MIN: CPT | Performed by: PHYSICIAN ASSISTANT

## 2022-11-28 RX ORDER — FAMOTIDINE 40 MG/1
40 TABLET, FILM COATED ORAL DAILY
Qty: 90 TABLET | Refills: 1 | Status: SHIPPED | OUTPATIENT
Start: 2022-11-28 | End: 2023-02-24 | Stop reason: SDUPTHER

## 2022-11-28 NOTE — PROGRESS NOTES
"Subjective   Genople Jill is a 67 y.o. female.       Chief Complaint -heartburn    History of Present Illness -    ROS    Heartburn-  Patient complains of heartburn secondary to uncontrolled gastroesophageal reflux disease.  Not at goal with omeprazole.    Constipation-  Uncontrolled as patient states she has had minimal relief with MiraLAX or over-the-counter laxatives.  She states that she only has a bowel movement every 3-4 days with some discomfort and cramping.    Skin lesion-  Patient complains of a skin lesion on her left breast that came up about a month ago.  The area is discolored and is in an area that is not usually exposed to the sun.    Hypothyroidism-  Stable with patient being asymptomatic on Synthroid 88 mcg daily.  TSH is slightly elevated with normal free T4 which was discussed with the patient today.    The following portions of the patient's history were reviewed and updated as appropriate: allergies, current medications, past family history, past medical history, past social history, past surgical history and problem list.    Review of Systems    Objective  Vital signs:  /70   Pulse 81   Temp 97.3 °F (36.3 °C) (Temporal)   Ht 165.1 cm (65\")   Wt 68.9 kg (152 lb)   SpO2 98%   BMI 25.29 kg/m²     Physical Exam  Vitals and nursing note reviewed.   Constitutional:       Appearance: Normal appearance. She is well-developed.   Eyes:      Extraocular Movements: Extraocular movements intact.      Conjunctiva/sclera: Conjunctivae normal.   Cardiovascular:      Rate and Rhythm: Normal rate and regular rhythm.      Heart sounds: Normal heart sounds. No murmur heard.  Pulmonary:      Effort: Pulmonary effort is normal. No respiratory distress.      Breath sounds: Normal breath sounds. No wheezing.   Musculoskeletal:         General: No tenderness.   Skin:     General: Skin is warm and dry.      Findings: No rash.      Comments: Approximately 1/2 x 1 cm dark brown macule with hypopigmented " central area   Neurological:      Mental Status: She is alert and oriented to person, place, and time.   Psychiatric:         Mood and Affect: Mood normal.         Behavior: Behavior normal.         Thought Content: Thought content normal.         The following data was reviewed by: EDD Vargas on 11/28/2022:  CMP    CMP 9/2/22 11/18/22   Glucose 83 89   BUN 23 23   Creatinine 0.95 1.18 (A)   Sodium 140 139   Potassium 4.3 4.2   Chloride 105 103   Calcium 9.5 10.0   Albumin 4.20 4.30   Total Bilirubin 0.3 0.5   Alkaline Phosphatase 54 65   AST (SGOT) 19 21   ALT (SGPT) 18 18   (A) Abnormal value              TSH    TSH 9/2/22 11/18/22   TSH 0.244 (A) 6.080 (A)   (A) Abnormal value                   Assessment & Plan     Diagnoses and all orders for this visit:    1. Gastroesophageal reflux disease without esophagitis (Primary)  Comments:  Discontinue omeprazole   Start Pepcid  Advised to avoid known trigger foods  Orders:  -     famotidine (Pepcid) 40 MG tablet; Take 1 tablet by mouth Daily.  Dispense: 90 tablet; Refill: 1    2. Chronic constipation  Comments:  Discontinue MiraLAX since not efficacious  Start Linzess and samples provided to patient today  Advised high-fiber diet  Orders:  -     linaclotide (LINZESS) 145 MCG capsule capsule; 1 every morning before breakfast  Dispense: 32 capsule; Refill: 0    3. Neoplasm of uncertain behavior of skin of breast  Comments:  Left breast skin lesion-plan punch biopsy for further evaluation    4. Hypothyroidism due to acquired atrophy of thyroid  Comments:  Since patient is asymptomatic on Synthroid continue same dose of 88 mcg daily  Discussed labs with patient today            Patient was given instructions and counseling regarding his condition or for health maintenance advice. Please see specific information pulled into the AVS if appropriate      This document has been electronically signed by:  Paulette Wallis PA-C

## 2022-12-02 ENCOUNTER — PROCEDURE VISIT (OUTPATIENT)
Dept: FAMILY MEDICINE CLINIC | Facility: CLINIC | Age: 67
End: 2022-12-02

## 2022-12-02 VITALS
SYSTOLIC BLOOD PRESSURE: 114 MMHG | OXYGEN SATURATION: 100 % | DIASTOLIC BLOOD PRESSURE: 74 MMHG | BODY MASS INDEX: 25.33 KG/M2 | HEIGHT: 65 IN | HEART RATE: 86 BPM | TEMPERATURE: 97.6 F | WEIGHT: 152 LBS

## 2022-12-02 DIAGNOSIS — K59.09 CHRONIC CONSTIPATION: Chronic | ICD-10-CM

## 2022-12-02 DIAGNOSIS — D48.5 NEOPLASM OF UNCERTAIN BEHAVIOR OF SKIN OF BREAST: Primary | ICD-10-CM

## 2022-12-02 PROCEDURE — 99213 OFFICE O/P EST LOW 20 MIN: CPT | Performed by: PHYSICIAN ASSISTANT

## 2022-12-02 PROCEDURE — 11104 PUNCH BX SKIN SINGLE LESION: CPT | Performed by: PHYSICIAN ASSISTANT

## 2022-12-02 RX ORDER — OMEPRAZOLE 40 MG/1
CAPSULE, DELAYED RELEASE ORAL
COMMUNITY
Start: 2022-11-30 | End: 2022-12-02 | Stop reason: SDUPTHER

## 2022-12-04 DIAGNOSIS — N28.9 RENAL INSUFFICIENCY: Primary | ICD-10-CM

## 2022-12-04 NOTE — PROGRESS NOTES
"Subjective   Genople Jill is a 67 y.o. female.       Chief Complaint -skin lesion    History of Present Illness -    ROS    Skin lesion-  She complains of a skin lesion on her left breast that has been present for about a month.  The area is not a sun exposed area and the lesion is new.  Plan punch biopsy today for further evaluation.    Constipation-  Patient reports minimal relief with Linzess 145 mg daily.    The following portions of the patient's history were reviewed and updated as appropriate: allergies, current medications, past family history, past medical history, past social history, past surgical history and problem list.    Review of Systems    Objective  Vital signs:  /74   Pulse 86   Temp 97.6 °F (36.4 °C) (Temporal)   Ht 165.1 cm (65\")   Wt 68.9 kg (152 lb)   SpO2 100%   BMI 25.29 kg/m²     Physical Exam  Vitals and nursing note reviewed.   Constitutional:       Appearance: Normal appearance. She is well-developed.   Eyes:      Extraocular Movements: Extraocular movements intact.      Conjunctiva/sclera: Conjunctivae normal.   Cardiovascular:      Rate and Rhythm: Normal rate and regular rhythm.      Heart sounds: Normal heart sounds. No murmur heard.  Pulmonary:      Effort: Pulmonary effort is normal. No respiratory distress.      Breath sounds: Normal breath sounds. No wheezing.   Musculoskeletal:         General: No tenderness.   Skin:     General: Skin is warm and dry.      Findings: No rash.      Comments: Approximately 1/2 x 1 cm dark brown macule with hypopigmented central area   Neurological:      Mental Status: She is alert and oriented to person, place, and time.   Psychiatric:         Mood and Affect: Mood normal.         Behavior: Behavior normal.         Thought Content: Thought content normal.                    Assessment & Plan     Diagnoses and all orders for this visit:    1. Neoplasm of uncertain behavior of skin of breast (Primary)  Comments:  2 mm punch biopsy " performed today and sent for pathology of skin lesion left breast  Orders:  -     Tissue Pathology Exam; Future  -     Tissue Pathology Exam    2. Chronic constipation  Comments:  Increase Linzess to 90 mg daily  Advised high-fiber diet and adequate water intake  Orders:  -     linaclotide (LINZESS) 290 MCG capsule capsule; Take 1 capsule by mouth Every Morning Before Breakfast.  Dispense: 32 capsule; Refill: 0    Procedure: 2 mm punch biopsy of skin lesion left breast  Provider: Paulette Wallis PA-C  Indication: Skin lesion left breast suspicious for malignancy  Timeout was taken to verify correct patient procedure and location  Description: The left breast was prepped and draped in sterile fashion.  1% lidocaine was used for local anesthesia.  2 mm punch biopsy was taken of the skin lesion and sent for pathology.  Pressure was held and sterile dressing was placed.  No complications  Estimate blood loss: Minimal  Patient tolerated procedure well          Patient was given instructions and counseling regarding his condition or for health maintenance advice. Please see specific information pulled into the AVS if appropriate      This document has been electronically signed by:  Paulette Wallis PA-C

## 2022-12-09 ENCOUNTER — TELEPHONE (OUTPATIENT)
Dept: FAMILY MEDICINE CLINIC | Facility: CLINIC | Age: 67
End: 2022-12-09

## 2022-12-09 NOTE — TELEPHONE ENCOUNTER
Caller: June Melchor    Relationship: Self    Best call back number: [phone]    Caller requesting test results: PATIENT    What test was performed: BIOPSY    When was the test performed: LAST FRIDAY    Where was the test performed: OFFICE

## 2022-12-09 NOTE — TELEPHONE ENCOUNTER
Caller: June Melchor    Relationship to patient: Self    Best call back number: [phone]    Patient is needing: PATIENT STATED THAT SHE WAS CALLING TO CHECK TO SEE IF RESULTS CAME BACK TODAY    PLEASE ADVISE

## 2022-12-30 ENCOUNTER — OFFICE VISIT (OUTPATIENT)
Dept: FAMILY MEDICINE CLINIC | Facility: CLINIC | Age: 67
End: 2022-12-30

## 2022-12-30 VITALS
SYSTOLIC BLOOD PRESSURE: 100 MMHG | WEIGHT: 151 LBS | TEMPERATURE: 96.8 F | HEIGHT: 65 IN | DIASTOLIC BLOOD PRESSURE: 68 MMHG | BODY MASS INDEX: 25.16 KG/M2 | HEART RATE: 72 BPM | OXYGEN SATURATION: 96 %

## 2022-12-30 DIAGNOSIS — E03.4 HYPOTHYROIDISM DUE TO ACQUIRED ATROPHY OF THYROID: Primary | Chronic | ICD-10-CM

## 2022-12-30 DIAGNOSIS — I10 BENIGN ESSENTIAL HYPERTENSION: ICD-10-CM

## 2022-12-30 DIAGNOSIS — K59.09 CHRONIC CONSTIPATION: Chronic | ICD-10-CM

## 2022-12-30 DIAGNOSIS — E78.2 MIXED HYPERLIPIDEMIA: ICD-10-CM

## 2022-12-30 DIAGNOSIS — F41.1 GENERALIZED ANXIETY DISORDER: Chronic | ICD-10-CM

## 2022-12-30 DIAGNOSIS — F51.04 PSYCHOPHYSIOLOGICAL INSOMNIA: ICD-10-CM

## 2022-12-30 PROCEDURE — 99214 OFFICE O/P EST MOD 30 MIN: CPT | Performed by: PHYSICIAN ASSISTANT

## 2022-12-30 RX ORDER — LOSARTAN POTASSIUM 100 MG/1
100 TABLET ORAL DAILY
Qty: 90 TABLET | Refills: 3 | Status: SHIPPED | OUTPATIENT
Start: 2022-12-30

## 2022-12-30 RX ORDER — LEVOTHYROXINE SODIUM 0.1 MG/1
100 TABLET ORAL DAILY
Qty: 90 TABLET | Refills: 1 | Status: SHIPPED | OUTPATIENT
Start: 2022-12-30 | End: 2023-02-24 | Stop reason: SDUPTHER

## 2022-12-30 RX ORDER — ROSUVASTATIN CALCIUM 20 MG/1
20 TABLET, COATED ORAL NIGHTLY
Qty: 90 TABLET | Refills: 3 | Status: SHIPPED | OUTPATIENT
Start: 2022-12-30

## 2022-12-30 RX ORDER — AMITRIPTYLINE HYDROCHLORIDE 100 MG/1
100 TABLET, FILM COATED ORAL NIGHTLY
Qty: 90 TABLET | Refills: 3 | Status: SHIPPED | OUTPATIENT
Start: 2022-12-30 | End: 2023-01-19

## 2022-12-30 RX ORDER — ESCITALOPRAM OXALATE 5 MG/1
5 TABLET ORAL DAILY
Qty: 90 TABLET | Refills: 1 | Status: SHIPPED | OUTPATIENT
Start: 2022-12-30 | End: 2023-02-24 | Stop reason: SDUPTHER

## 2022-12-30 RX ORDER — FENOFIBRATE 145 MG/1
145 TABLET, COATED ORAL DAILY
Qty: 90 TABLET | Refills: 3 | Status: SHIPPED | OUTPATIENT
Start: 2022-12-30

## 2022-12-30 NOTE — PROGRESS NOTES
"Subjective   Genople Jill is a 67 y.o. female.       Chief Complaint -hypothyroidism    History of Present Illness -    ROS    Hypothyroidism-  Not at goal as most recent lab work was abnormal despite the use of Synthroid 88 mcg daily    Anxiety-  Not at goal with hydroxyzine.  Patient reports nervousness and being anxious daily.  She denies any hallucinations SI or HI    Insomnia-  Stable with amitriptyline    Hyperlipidemia-stable with fenofibrate and dietary modification    Constipation-stable with Linzess and advised high-fiber diet    Hypertension-controlled with losartan    The following portions of the patient's history were reviewed and updated as appropriate: allergies, current medications, past family history, past medical history, past social history, past surgical history and problem list.    Review of Systems    Objective  Vital signs:  /68   Pulse 72   Temp 96.8 °F (36 °C) (Temporal)   Ht 165.1 cm (65\")   Wt 68.5 kg (151 lb)   SpO2 96%   BMI 25.13 kg/m²     Physical Exam  Vitals and nursing note reviewed.   Constitutional:       Appearance: Normal appearance. She is well-developed.   Eyes:      Extraocular Movements: Extraocular movements intact.      Conjunctiva/sclera: Conjunctivae normal.   Cardiovascular:      Rate and Rhythm: Normal rate and regular rhythm.      Heart sounds: Normal heart sounds. No murmur heard.  Pulmonary:      Effort: Pulmonary effort is normal. No respiratory distress.      Breath sounds: Normal breath sounds. No wheezing.   Musculoskeletal:         General: No tenderness.   Skin:     General: Skin is warm and dry.      Findings: No rash.   Neurological:      Mental Status: She is alert and oriented to person, place, and time.   Psychiatric:         Mood and Affect: Mood normal.         Behavior: Behavior normal.         Thought Content: Thought content normal.         The following data was reviewed by: EDD Vargas on 12/30/2022:  CMP    CMP 9/2/22 " 11/18/22   Glucose 83 89   BUN 23 23   Creatinine 0.95 1.18 (A)   eGFR 65.4 50.1 (A)   Sodium 140 139   Potassium 4.3 4.2   Chloride 105 103   Calcium 9.5 10.0   Total Protein 6.2 7.1   Albumin 4.20 4.30   Globulin 2.0 2.8   Total Bilirubin 0.3 0.5   Alkaline Phosphatase 54 65   AST (SGOT) 19 21   ALT (SGPT) 18 18   Albumin/Globulin Ratio 2.1 1.5   BUN/Creatinine Ratio 24.2 19.5   Anion Gap 8.7 8.4   (A) Abnormal value       Comments are available for some flowsheets but are not being displayed.             Lipid Panel    Lipid Panel 9/2/22   Total Cholesterol 135   Triglycerides 69   HDL Cholesterol 58   VLDL Cholesterol 14   LDL Cholesterol  63   LDL/HDL Ratio 1.09           TSH    TSH 9/2/22 11/18/22   TSH 0.244 (A) 6.080 (A)   (A) Abnormal value                       Assessment & Plan     Diagnoses and all orders for this visit:    1. Hypothyroidism due to acquired atrophy of thyroid (Primary)  Comments:  Increase Synthroid 100 mcg daily  Recheck lab work in 8 weeks  Orders:  -     levothyroxine (Synthroid) 100 MCG tablet; Take 1 tablet by mouth Daily.  Dispense: 90 tablet; Refill: 1  -     TSH; Future  -     Comprehensive Metabolic Panel; Future  -     T4, Free; Future    2. Generalized anxiety disorder  Comments:  Discontinued buspirone secondary to dizziness  Discontinue hydroxyzine-not efficacious  Start escitalopram  Orders:  -     escitalopram (Lexapro) 5 MG tablet; Take 1 tablet by mouth Daily.  Dispense: 90 tablet; Refill: 1    3. Psychophysiological insomnia  Comments:  Continue amitriptyline 100 mg nightly  Orders:  -     amitriptyline (ELAVIL) 100 MG tablet; Take 1 tablet by mouth Every Night.  Dispense: 90 tablet; Refill: 3  -     Comprehensive Metabolic Panel; Future    4. Mixed hyperlipidemia  Comments:  Advised low-cholesterol diet  Continue fenofibrate  Orders:  -     fenofibrate (TRICOR) 145 MG tablet; Take 1 tablet by mouth Daily.  Dispense: 90 tablet; Refill: 3  -     rosuvastatin (CRESTOR) 20  MG tablet; Take 1 tablet by mouth Every Night.  Dispense: 90 tablet; Refill: 3    5. Chronic constipation  Comments:  Continue Linzess 290 micrograms daily  Advised high-fiber diet and adequate water intake  Orders:  -     linaclotide (LINZESS) 290 MCG capsule capsule; Take 1 capsule by mouth Every Morning Before Breakfast.  Dispense: 90 capsule; Refill: 1    6. Benign essential hypertension  Comments:  Continue to monitor daily blood pressure and pulse   Continue losartan  Orders:  -     losartan (COZAAR) 100 MG tablet; Take 1 tablet by mouth Daily.  Dispense: 90 tablet; Refill: 3            Patient was given instructions and counseling regarding his condition or for health maintenance advice. Please see specific information pulled into the AVS if appropriate      This document has been electronically signed by:  Paulette Wallis PA-C

## 2023-01-18 DIAGNOSIS — F51.04 PSYCHOPHYSIOLOGICAL INSOMNIA: ICD-10-CM

## 2023-01-19 RX ORDER — AMITRIPTYLINE HYDROCHLORIDE 100 MG/1
TABLET, FILM COATED ORAL
Qty: 90 TABLET | Refills: 3 | Status: SHIPPED | OUTPATIENT
Start: 2023-01-19

## 2023-02-01 ENCOUNTER — CLINICAL SUPPORT (OUTPATIENT)
Dept: FAMILY MEDICINE CLINIC | Facility: CLINIC | Age: 68
End: 2023-02-01
Payer: MEDICARE

## 2023-02-01 VITALS — DIASTOLIC BLOOD PRESSURE: 60 MMHG | SYSTOLIC BLOOD PRESSURE: 120 MMHG

## 2023-02-17 ENCOUNTER — LAB (OUTPATIENT)
Dept: FAMILY MEDICINE CLINIC | Facility: CLINIC | Age: 68
End: 2023-02-17
Payer: MEDICARE

## 2023-02-17 DIAGNOSIS — F51.04 PSYCHOPHYSIOLOGICAL INSOMNIA: ICD-10-CM

## 2023-02-17 DIAGNOSIS — E03.4 HYPOTHYROIDISM DUE TO ACQUIRED ATROPHY OF THYROID: Chronic | ICD-10-CM

## 2023-02-17 LAB
ALBUMIN SERPL-MCNC: 4.6 G/DL (ref 3.5–5.2)
ALBUMIN/GLOB SERPL: 1.8 G/DL
ALP SERPL-CCNC: 67 U/L (ref 39–117)
ALT SERPL W P-5'-P-CCNC: 17 U/L (ref 1–33)
ANION GAP SERPL CALCULATED.3IONS-SCNC: 8.1 MMOL/L (ref 5–15)
AST SERPL-CCNC: 24 U/L (ref 1–32)
BILIRUB SERPL-MCNC: 0.4 MG/DL (ref 0–1.2)
BUN SERPL-MCNC: 30 MG/DL (ref 8–23)
BUN/CREAT SERPL: 31.6 (ref 7–25)
CALCIUM SPEC-SCNC: 9.9 MG/DL (ref 8.6–10.5)
CHLORIDE SERPL-SCNC: 101 MMOL/L (ref 98–107)
CO2 SERPL-SCNC: 27.9 MMOL/L (ref 22–29)
CREAT SERPL-MCNC: 0.95 MG/DL (ref 0.57–1)
EGFRCR SERPLBLD CKD-EPI 2021: 65 ML/MIN/1.73
GLOBULIN UR ELPH-MCNC: 2.5 GM/DL
GLUCOSE SERPL-MCNC: 93 MG/DL (ref 65–99)
POTASSIUM SERPL-SCNC: 3.8 MMOL/L (ref 3.5–5.2)
PROT SERPL-MCNC: 7.1 G/DL (ref 6–8.5)
SODIUM SERPL-SCNC: 137 MMOL/L (ref 136–145)
T4 FREE SERPL-MCNC: 1.23 NG/DL (ref 0.93–1.7)
TSH SERPL DL<=0.05 MIU/L-ACNC: 2.1 UIU/ML (ref 0.27–4.2)

## 2023-02-17 PROCEDURE — 80053 COMPREHEN METABOLIC PANEL: CPT | Performed by: PHYSICIAN ASSISTANT

## 2023-02-17 PROCEDURE — 84439 ASSAY OF FREE THYROXINE: CPT | Performed by: PHYSICIAN ASSISTANT

## 2023-02-17 PROCEDURE — 84443 ASSAY THYROID STIM HORMONE: CPT | Performed by: PHYSICIAN ASSISTANT

## 2023-02-24 ENCOUNTER — OFFICE VISIT (OUTPATIENT)
Dept: FAMILY MEDICINE CLINIC | Facility: CLINIC | Age: 68
End: 2023-02-24
Payer: MEDICARE

## 2023-02-24 VITALS
HEIGHT: 65 IN | TEMPERATURE: 96.8 F | DIASTOLIC BLOOD PRESSURE: 74 MMHG | BODY MASS INDEX: 24.49 KG/M2 | SYSTOLIC BLOOD PRESSURE: 112 MMHG | OXYGEN SATURATION: 98 % | WEIGHT: 147 LBS | HEART RATE: 80 BPM

## 2023-02-24 DIAGNOSIS — K21.9 GASTROESOPHAGEAL REFLUX DISEASE WITHOUT ESOPHAGITIS: Chronic | ICD-10-CM

## 2023-02-24 DIAGNOSIS — F41.1 GENERALIZED ANXIETY DISORDER: Chronic | ICD-10-CM

## 2023-02-24 DIAGNOSIS — M50.30 BULGE OF CERVICAL DISC WITHOUT MYELOPATHY: Chronic | ICD-10-CM

## 2023-02-24 DIAGNOSIS — M51.36 BULGING OF LUMBAR INTERVERTEBRAL DISC: Chronic | ICD-10-CM

## 2023-02-24 DIAGNOSIS — M51.36 DDD (DEGENERATIVE DISC DISEASE), LUMBAR: Chronic | ICD-10-CM

## 2023-02-24 DIAGNOSIS — R61 HYPERHIDROSIS: Primary | Chronic | ICD-10-CM

## 2023-02-24 DIAGNOSIS — K59.09 CHRONIC CONSTIPATION: Chronic | ICD-10-CM

## 2023-02-24 DIAGNOSIS — E03.4 HYPOTHYROIDISM DUE TO ACQUIRED ATROPHY OF THYROID: Chronic | ICD-10-CM

## 2023-02-24 PROBLEM — M51.369 DDD (DEGENERATIVE DISC DISEASE), LUMBAR: Status: ACTIVE | Noted: 2023-02-24

## 2023-02-24 PROBLEM — M51.369 BULGING OF LUMBAR INTERVERTEBRAL DISC: Status: ACTIVE | Noted: 2023-02-24

## 2023-02-24 PROCEDURE — 99214 OFFICE O/P EST MOD 30 MIN: CPT | Performed by: PHYSICIAN ASSISTANT

## 2023-02-24 RX ORDER — ESCITALOPRAM OXALATE 5 MG/1
5 TABLET ORAL DAILY
Qty: 90 TABLET | Refills: 1 | Status: SHIPPED | OUTPATIENT
Start: 2023-02-24

## 2023-02-24 RX ORDER — LUBIPROSTONE 24 UG/1
24 CAPSULE ORAL 2 TIMES DAILY WITH MEALS
Qty: 60 CAPSULE | Refills: 5 | Status: SHIPPED | OUTPATIENT
Start: 2023-02-24 | End: 2023-03-24 | Stop reason: SDUPTHER

## 2023-02-24 RX ORDER — LEVOTHYROXINE SODIUM 0.1 MG/1
100 TABLET ORAL DAILY
Qty: 90 TABLET | Refills: 1 | Status: SHIPPED | OUTPATIENT
Start: 2023-02-24

## 2023-02-24 RX ORDER — GLYCOPYRROLATE 1 MG/1
1 TABLET ORAL DAILY
Qty: 90 TABLET | Refills: 1 | Status: SHIPPED | OUTPATIENT
Start: 2023-02-24

## 2023-02-24 RX ORDER — FAMOTIDINE 40 MG/1
40 TABLET, FILM COATED ORAL DAILY
Qty: 90 TABLET | Refills: 1 | Status: SHIPPED | OUTPATIENT
Start: 2023-02-24

## 2023-03-10 ENCOUNTER — TELEPHONE (OUTPATIENT)
Dept: FAMILY MEDICINE CLINIC | Facility: CLINIC | Age: 68
End: 2023-03-10

## 2023-03-10 NOTE — TELEPHONE ENCOUNTER
Inform the patient that her labs look good.  Her renal function is back to normal and thyroid is in the normal range.

## 2023-03-10 NOTE — TELEPHONE ENCOUNTER
Caller: June Melchor    Relationship: Self    Best call back number: [phone]    Caller requesting test results: SELF    What test was performed: LABS    When was the test performed:     Where was the test performed: [facility]    Additional notes:

## 2023-03-20 DIAGNOSIS — F51.04 PSYCHOPHYSIOLOGICAL INSOMNIA: ICD-10-CM

## 2023-03-20 DIAGNOSIS — F41.1 GENERALIZED ANXIETY DISORDER: Chronic | ICD-10-CM

## 2023-03-21 RX ORDER — HYDROXYZINE 50 MG/1
TABLET, FILM COATED ORAL
Qty: 270 TABLET | Refills: 3 | Status: SHIPPED | OUTPATIENT
Start: 2023-03-21

## 2023-03-24 ENCOUNTER — OFFICE VISIT (OUTPATIENT)
Dept: FAMILY MEDICINE CLINIC | Facility: CLINIC | Age: 68
End: 2023-03-24
Payer: MEDICARE

## 2023-03-24 ENCOUNTER — PRIOR AUTHORIZATION (OUTPATIENT)
Dept: FAMILY MEDICINE CLINIC | Facility: CLINIC | Age: 68
End: 2023-03-24
Payer: MEDICARE

## 2023-03-24 VITALS
HEIGHT: 65 IN | SYSTOLIC BLOOD PRESSURE: 100 MMHG | WEIGHT: 145 LBS | HEART RATE: 74 BPM | BODY MASS INDEX: 24.16 KG/M2 | TEMPERATURE: 97.5 F | DIASTOLIC BLOOD PRESSURE: 50 MMHG | OXYGEN SATURATION: 97 %

## 2023-03-24 DIAGNOSIS — E78.2 MIXED HYPERLIPIDEMIA: Chronic | ICD-10-CM

## 2023-03-24 DIAGNOSIS — E03.4 HYPOTHYROIDISM DUE TO ACQUIRED ATROPHY OF THYROID: Chronic | ICD-10-CM

## 2023-03-24 DIAGNOSIS — K59.09 CHRONIC CONSTIPATION: Primary | Chronic | ICD-10-CM

## 2023-03-24 PROCEDURE — 3074F SYST BP LT 130 MM HG: CPT | Performed by: PHYSICIAN ASSISTANT

## 2023-03-24 PROCEDURE — 3078F DIAST BP <80 MM HG: CPT | Performed by: PHYSICIAN ASSISTANT

## 2023-03-24 PROCEDURE — 1159F MED LIST DOCD IN RCRD: CPT | Performed by: PHYSICIAN ASSISTANT

## 2023-03-24 PROCEDURE — 1160F RVW MEDS BY RX/DR IN RCRD: CPT | Performed by: PHYSICIAN ASSISTANT

## 2023-03-24 PROCEDURE — 99214 OFFICE O/P EST MOD 30 MIN: CPT | Performed by: PHYSICIAN ASSISTANT

## 2023-03-24 RX ORDER — LUBIPROSTONE 24 UG/1
24 CAPSULE ORAL 2 TIMES DAILY WITH MEALS
Qty: 60 CAPSULE | Refills: 5 | Status: SHIPPED | OUTPATIENT
Start: 2023-03-24

## 2023-03-24 NOTE — PROGRESS NOTES
"Subjective   Genople Jill is a 67 y.o. female.       Chief Complaint -constipation    History of Present Illness -    ROS    Constipation-  Not at goal as patient states she still has issues with constipation.  She was unable to get the Amitiza stating that the prescription was not at her pharmacy.    Hypothyroidism-  Stable with Synthroid 100 mcg daily.  Most recent thyroid function from 2/17/2023 was within normal limits.    Hyperlipidemia-stable with rosuvastatin and low-cholesterol diet    The following portions of the patient's history were reviewed and updated as appropriate: allergies, current medications, past family history, past medical history, past social history, past surgical history and problem list.    Review of Systems    Objective  Vital signs:  /50 (BP Location: Right arm, Patient Position: Sitting, Cuff Size: Adult)   Pulse 74   Temp 97.5 °F (36.4 °C) (Temporal)   Ht 165.1 cm (65\")   Wt 65.8 kg (145 lb)   SpO2 97%   BMI 24.13 kg/m²     Physical Exam  Vitals and nursing note reviewed.   Constitutional:       Appearance: Normal appearance. She is well-developed.   Eyes:      Extraocular Movements: Extraocular movements intact.      Conjunctiva/sclera: Conjunctivae normal.   Cardiovascular:      Rate and Rhythm: Normal rate and regular rhythm.      Heart sounds: Normal heart sounds. No murmur heard.  Pulmonary:      Effort: Pulmonary effort is normal. No respiratory distress.      Breath sounds: Normal breath sounds. No wheezing.   Musculoskeletal:         General: No tenderness.   Skin:     General: Skin is warm and dry.      Findings: No rash.   Neurological:      Mental Status: She is alert and oriented to person, place, and time.   Psychiatric:         Mood and Affect: Mood normal.         Behavior: Behavior normal.         Thought Content: Thought content normal.         The following data was reviewed by: EDD Vargas on 03/24/2023:  CMP    CMP 9/2/22 11/18/22 2/17/23 "   Glucose 83 89 93   BUN 23 23 30 (A)   Creatinine 0.95 1.18 (A) 0.95   eGFR 65.4 50.1 (A) 65.0   Sodium 140 139 137   Potassium 4.3 4.2 3.8   Chloride 105 103 101   Calcium 9.5 10.0 9.9   Total Protein 6.2 7.1 7.1   Albumin 4.20 4.30 4.6   Globulin 2.0 2.8 2.5   Total Bilirubin 0.3 0.5 0.4   Alkaline Phosphatase 54 65 67   AST (SGOT) 19 21 24   ALT (SGPT) 18 18 17   Albumin/Globulin Ratio 2.1 1.5 1.8   BUN/Creatinine Ratio 24.2 19.5 31.6 (A)   Anion Gap 8.7 8.4 8.1   (A) Abnormal value       Comments are available for some flowsheets but are not being displayed.             Lipid Panel    Lipid Panel 9/2/22   Total Cholesterol 135   Triglycerides 69   HDL Cholesterol 58   VLDL Cholesterol 14   LDL Cholesterol  63   LDL/HDL Ratio 1.09           TSH    TSH 9/2/22 11/18/22 2/17/23   TSH 0.244 (A) 6.080 (A) 2.100   (A) Abnormal value                       Assessment & Plan     Diagnoses and all orders for this visit:    1. Chronic constipation (Primary)  Comments:  Discontinue Linzess secondary to GI intolerance  Start Amitiza prescription will be resent  Plan to work on PA in case that was the issue  Orders:  -     lubiprostone (AMITIZA) 24 MCG capsule; Take 1 capsule by mouth 2 (Two) Times a Day With Meals.  Dispense: 60 capsule; Refill: 5    2. Hypothyroidism due to acquired atrophy of thyroid  Comments:  Continue Synthroid 100 mcg daily  Continue to monitor    3. Mixed hyperlipidemia  Comments:  Continue rosuvastatin  Advised low-cholesterol diet            Patient was given instructions and counseling regarding his condition or for health maintenance advice. Please see specific information pulled into the AVS if appropriate      This document has been electronically signed by:  Paulette Wallis PA-C

## 2023-04-04 ENCOUNTER — TELEPHONE (OUTPATIENT)
Dept: FAMILY MEDICINE CLINIC | Facility: CLINIC | Age: 68
End: 2023-04-04

## 2023-04-04 DIAGNOSIS — K59.04 CHRONIC IDIOPATHIC CONSTIPATION: Primary | ICD-10-CM

## 2023-04-04 NOTE — TELEPHONE ENCOUNTER
Patient stated that miralax was to expensive. She wanted to know if there was anything else you could give her. She has tried linzess and it gave her diarrhea.

## 2023-04-04 NOTE — TELEPHONE ENCOUNTER
Advised the patient to take MiraLAX daily along with the Amitiza and have adequate hydration with high-fiber diet.  I have placed a referral to gastroenterology specialist for further evaluation.

## 2023-04-04 NOTE — TELEPHONE ENCOUNTER
She has already tried colase stool softeners. She can use dulcolax for short term use but not over a few days.

## 2023-04-04 NOTE — TELEPHONE ENCOUNTER
Caller: June Melchor    Relationship: Self    Best call back number:  [phone]    Who are you requesting to speak with (clinical staff, provider,  specific staff member):  CLINICAL     What was the call regarding: PATIENT SAID THE MEDICATION lubiprostone (AMITIZA) 24 MCG capsule 24 MG   IS NOT WORKING   SHE HAS BEEN TAKING IT FOR 2 WEEKS     [facility]     PLEASE CALL AND ADVISE

## 2023-04-10 ENCOUNTER — TELEPHONE (OUTPATIENT)
Dept: FAMILY MEDICINE CLINIC | Facility: CLINIC | Age: 68
End: 2023-04-10

## 2023-04-10 NOTE — TELEPHONE ENCOUNTER
Caller: June Melchor    Relationship: Self    Best call back number: [phone]    What is the best time to reach you: ANYTIME    Who are you requesting to speak with (clinical staff, provider,  specific staff member): CLINICAL    Do you know the name of the person who called: PATIENT    What was the call regarding: PATIENT WOULD LIKE TO KNOW IF SHE IS DIABETIC    Do you require a callback: PLEASE ADVISE OF BLOOD TEST RESULTS

## 2023-05-05 PROBLEM — E03.9 HYPOTHYROIDISM: Status: ACTIVE | Noted: 2023-04-18

## 2023-05-05 PROBLEM — R73.03 PREDIABETES: Status: ACTIVE | Noted: 2023-04-18

## 2023-05-05 NOTE — PROGRESS NOTES
QUICK REFERENCE INFORMATION:  The ABCs of the Annual Wellness Visit    Initial Medicare Wellness Visit    HEALTH RISK ASSESSMENT    1955    Recent Hospitalizations:  No hospitalization(s) within the last year..        Current Medical Providers:  Patient Care Team:  Paulette Wallis PA as PCP - General (Family Medicine)        Smoking Status:  Social History     Tobacco Use   Smoking Status Former Smoker   Smokeless Tobacco Never Used       Alcohol Consumption:  Social History     Substance and Sexual Activity   Alcohol Use No       Depression Screen:   PHQ-2/PHQ-9 Depression Screening 12/7/2018   Little interest or pleasure in doing things 0   Feeling down, depressed, or hopeless 0   Total Score 0       Health Habits and Functional and Cognitive Screening:  Functional & Cognitive Status 12/7/2018   Do you have difficulty preparing food and eating? No   Do you have difficulty bathing yourself, getting dressed or grooming yourself? No   Do you have difficulty using the toilet? No   Do you have difficulty moving around from place to place? No   Do you have trouble with steps or getting out of a bed or a chair? No   In the past year have you fallen or experienced a near fall? No   Current Diet Well Balanced Diet   Dental Exam Up to date   Eye Exam Up to date   Exercise (times per week) 0 times per week   Current Exercise Activities Include None   Do you need help using the phone?  No   Are you deaf or do you have serious difficulty hearing?  No   Do you need help with transportation? No   Do you need help shopping? No   Do you need help preparing meals?  No   Do you need help with housework?  No   Do you need help with laundry? No   Do you need help taking your medications? No   Do you need help managing money? No   Do you ever drive or ride in a car without wearing a seat belt? No   Have you felt unusual stress, anger or loneliness in the last month? No   Who do you live with? Alone   If you need help, do you  have trouble finding someone available to you? No   Have you been bothered in the last four weeks by sexual problems? No   Do you have difficulty concentrating, remembering or making decisions? Yes     Does the patient have evidence of cognitive impairment? No    Fall Risk Assessment  Fallen in past 6 months: 0--> No  Mental Status: 0--> no mental status change  Mobility: 0--> No mobility issues  Medications: 0--> No meds  Total Fall Risk Score: 1    Aspirin use counseling: Does not need ASA (and currently is not on it)      Recent Lab Results:  CMP:  Lab Results   Component Value Date    BUN 20 08/21/2018    CREATININE 0.98 08/21/2018    EGFRIFNONA 57 (L) 08/21/2018    BCR 20.4 08/21/2018     08/21/2018    K 3.9 08/21/2018    CO2 27.7 08/21/2018    CALCIUM 9.6 08/21/2018    ALBUMIN 4.70 08/21/2018    BILITOT 0.6 08/21/2018    ALKPHOS 68 08/21/2018    AST 32 (H) 08/21/2018    ALT 30 08/21/2018     Lipid Panel:  Lab Results   Component Value Date    CHOL 150 08/21/2018    TRIG 94 08/21/2018    HDL 60 08/21/2018    VLDL 18.8 08/21/2018    LDLHDL 1.19 08/21/2018     HbA1c:  Lab Results   Component Value Date    HGBA1C 5.10 07/03/2017       Visual Acuity:   Visual Acuity Screening    Right eye Left eye Both eyes   Without correction: 20/25 20/20 20/20   With correction:        Hearing acuity:  Whisper test  Greater than 5 feet left ear  Greater than 5 feet right ear    Age-appropriate Screening Schedule:  Refer to the list below for future screening recommendations based on patient's age, sex and/or medical conditions. Orders for these recommended tests are listed in the plan section. The patient has been provided with a written plan.    Health Maintenance   Topic Date Due   • ZOSTER VACCINE (2 of 2) 02/26/2015   • PNEUMOCOCCAL VACCINES (65+ LOW/MEDIUM RISK) (1 of 2 - PCV13) 09/10/2018   • PAP SMEAR  10/15/2018   • MAMMOGRAM  05/09/2019   • LIPID PANEL  08/21/2019   • DXA SCAN  12/07/2020   • COLONOSCOPY   02/09/2022   • TDAP/TD VACCINES (2 - Td) 02/09/2027   • INFLUENZA VACCINE  Completed        She reports that she received her pneumonia vaccine and zoster vaccine at [facility] in [facility].  Plan to request those records for proper documentation.  She declines the new herpes zoster vaccination.  She did receive the original zoster vaccination.    Subjective   History of Present Illness    June Melchor is a 63 y.o. female who presents for an initial Wellness Visit.    Hypertension-well controlled with losartan    Hyperlipidemia-improved with Crestor and TriCor    Gastroesophageal reflux disease-currently stable with diet    Hypothyroidism-controlled with Synthroid 100 µg daily    Generalized anxiety disorder/depression/insomnia-stable with trazodone    The following portions of the patient's history were reviewed and updated as appropriate: allergies, current medications, past family history, past medical history, past social history, past surgical history and problem list.    Outpatient Medications Prior to Visit   Medication Sig Dispense Refill   • fenofibrate (TRICOR) 145 MG tablet Take 1 tablet by mouth Daily. 30 tablet 5   • hydrOXYzine (VISTARIL) 50 MG capsule Take 1 capsule by mouth 3 (Three) Times a Day As Needed for Itching. 90 capsule 5   • levothyroxine (SYNTHROID, LEVOTHROID) 100 MCG tablet Take 1 tablet by mouth Daily. 30 tablet 5   • losartan (COZAAR) 100 MG tablet Take 1 tablet by mouth Daily. 30 tablet 5   • raNITIdine (ZANTAC) 150 MG tablet Take 2 tablets by mouth 2 (Two) Times a Day. 60 tablet 5   • rosuvastatin (CRESTOR) 20 MG tablet Take 1 tablet by mouth Every Night. 30 tablet 5   • traZODone (DESYREL) 100 MG tablet Take 2 tablets by mouth Every Night. 60 tablet 5     No facility-administered medications prior to visit.        Patient Active Problem List   Diagnosis   • Hypothyroidism due to acquired atrophy of thyroid   • Hyperlipidemia   • Benign essential hypertension   •  Gastroesophageal reflux disease without esophagitis   • Generalized anxiety disorder   • Primary insomnia   • Menopausal and postmenopausal disorder   • Major depression, chronic   • Family history of colon cancer   • Family history of polyps in the colon       Advance Care Planning:  has NO advance directive - information provided to the patient today    Identification of Risk Factors:  Risk factors include: cardiovascular risk and polypharmacy.    Review of Systems   Constitutional: Negative for activity change, appetite change and fever.   HENT: Negative for ear pain, sinus pressure and sore throat.    Eyes: Negative for pain and visual disturbance.   Respiratory: Negative for cough and chest tightness.    Cardiovascular: Negative for chest pain and palpitations.   Gastrointestinal: Negative for abdominal pain, constipation, diarrhea, nausea and vomiting.   Endocrine: Negative for polydipsia and polyuria.   Genitourinary: Negative for dysuria and frequency.   Musculoskeletal: Negative for back pain and myalgias.   Skin: Negative for color change and rash.   Allergic/Immunologic: Negative for food allergies and immunocompromised state.   Neurological: Negative for dizziness, syncope and headaches.   Hematological: Negative for adenopathy. Does not bruise/bleed easily.   Psychiatric/Behavioral: Negative for hallucinations and suicidal ideas. The patient is not nervous/anxious.        Compared to one year ago, the patient feels her physical health is the same.  Compared to one year ago, the patient feels her mental health is better.    Objective     Physical Exam   Constitutional: She is oriented to person, place, and time. She appears well-developed and well-nourished.   HENT:   Head: Normocephalic and atraumatic.   Nose: Nose normal.   Mouth/Throat: Oropharynx is clear and moist.   Eyes: Conjunctivae and EOM are normal. Pupils are equal, round, and reactive to light.   Neck: Normal range of motion. Neck supple. No  "tracheal deviation present. No thyromegaly present.   Cardiovascular: Normal rate, regular rhythm, normal heart sounds and intact distal pulses.   No murmur heard.  Pulmonary/Chest: Effort normal and breath sounds normal. No respiratory distress. She has no wheezes.   Abdominal: Soft. Bowel sounds are normal. There is no tenderness. There is no guarding.   Musculoskeletal: Normal range of motion. She exhibits no edema or tenderness.   Lymphadenopathy:     She has no cervical adenopathy.   Neurological: She is alert and oriented to person, place, and time.   Skin: Skin is warm and dry. No rash noted.   Psychiatric: She has a normal mood and affect. Her behavior is normal.   Nursing note and vitals reviewed.      Vitals:    12/07/18 1247   BP: 126/74   Pulse: 58   Temp: 98.4 °F (36.9 °C)   TempSrc: Oral   SpO2: 97%   Weight: 84.4 kg (186 lb)   Height: 165.1 cm (65\")   PainSc: 0-No pain       Patient's Body mass index is 30.95 kg/m². BMI is above normal parameters. Recommendations include: educational material, exercise counseling and nutrition counseling.      Assessment/Plan   Patient Self-Management and Personalized Health Advice  The patient has been provided with information about: diet and designing advance directives and preventive services including:   · Advance directive, Bone densitometry screening, Fall Risk assessment done, Nutrition counseling provided.    Visit Diagnoses:    ICD-10-CM ICD-9-CM   1. Initial Medicare annual wellness visit Z00.00 V70.0   2. Benign essential hypertension I10 401.1   3. Mixed hyperlipidemia E78.2 272.2   4. Gastroesophageal reflux disease without esophagitis K21.9 530.81   5. Hypothyroidism due to acquired atrophy of thyroid E03.4 244.8     246.8   6. Generalized anxiety disorder F41.1 300.02   7. Major depression, chronic F34.1 296.20   8. Menopausal and postmenopausal disorder N95.9 627.9   9. Primary insomnia F51.01 307.42   10. Screening for osteoporosis Z13.820 V82.81 "   11. Encounter for hepatitis C screening test for low risk patient Z11.59 V73.89   12. Symptomatic premature menopause  E28.310 256.31       Orders Placed This Encounter   Procedures   • DEXA Bone Density Axial     Standing Status:   Future     Standing Expiration Date:   12/7/2019     Scheduling Instructions:      Pt requests patricia apt     Order Specific Question:   Reason for Exam:     Answer:   screen for osteoporosis   • Hepatitis C antibody     Standing Status:   Future     Standing Expiration Date:   12/7/2019   • TSH     Standing Status:   Future     Standing Expiration Date:   12/7/2019   • Comprehensive Metabolic Panel     Standing Status:   Future     Standing Expiration Date:   12/7/2019   • Lipid Panel     Standing Status:   Future     Standing Expiration Date:   12/7/2019   • T4, Free     Standing Status:   Future     Standing Expiration Date:   12/7/2019       Outpatient Encounter Medications as of 12/7/2018   Medication Sig Dispense Refill   • fenofibrate (TRICOR) 145 MG tablet Take 1 tablet by mouth Daily. 90 tablet 3   • levothyroxine (SYNTHROID, LEVOTHROID) 100 MCG tablet Take 1 tablet by mouth Daily. 90 tablet 3   • losartan (COZAAR) 100 MG tablet Take 1 tablet by mouth Daily. 90 tablet 3   • rosuvastatin (CRESTOR) 20 MG tablet Take 1 tablet by mouth Every Night. 90 tablet 3   • traZODone (DESYREL) 100 MG tablet Take 2 tablets by mouth Every Night. 180 tablet 3   • [DISCONTINUED] fenofibrate (TRICOR) 145 MG tablet Take 1 tablet by mouth Daily. 30 tablet 5   • [DISCONTINUED] hydrOXYzine (VISTARIL) 50 MG capsule Take 1 capsule by mouth 3 (Three) Times a Day As Needed for Itching. 90 capsule 5   • [DISCONTINUED] levothyroxine (SYNTHROID, LEVOTHROID) 100 MCG tablet Take 1 tablet by mouth Daily. 30 tablet 5   • [DISCONTINUED] losartan (COZAAR) 100 MG tablet Take 1 tablet by mouth Daily. 30 tablet 5   • [DISCONTINUED] raNITIdine (ZANTAC) 150 MG tablet Take 2 tablets by mouth 2 (Two) Times a Day.  60 tablet 5   • [DISCONTINUED] rosuvastatin (CRESTOR) 20 MG tablet Take 1 tablet by mouth Every Night. 30 tablet 5   • [DISCONTINUED] traZODone (DESYREL) 100 MG tablet Take 2 tablets by mouth Every Night. 60 tablet 5     No facility-administered encounter medications on file as of 12/7/2018.        Reviewed use of high risk medication in the elderly: not applicable  Reviewed for potential of harmful drug interactions in the elderly: yes    Follow Up:  Return in about 6 months (around 6/7/2019) for Followup with Regi Caldwell just prior to return.     An After Visit Summary and PPPS with all of these plans were given to the patient.          [] : no respiratory distress [Auscultation Breath Sounds / Voice Sounds] : lungs were clear to auscultation bilaterally [Heart Rate And Rhythm] : heart rate was normal and rhythm regular [Heart Sounds] : normal S1 and S2 [Heart Sounds Gallop] : no gallops [Murmurs] : no murmurs [Heart Sounds Pericardial Friction Rub] : no pericardial rub [Examination Of The Chest] : the chest was normal in appearance [Chest Visual Inspection Thoracic Asymmetry] : no chest asymmetry [Diminished Respiratory Excursion] : normal chest expansion

## 2023-06-13 ENCOUNTER — TRANSCRIBE ORDERS (OUTPATIENT)
Dept: ADMINISTRATIVE | Facility: HOSPITAL | Age: 68
End: 2023-06-13
Payer: MEDICARE

## 2023-06-13 DIAGNOSIS — Z12.31 VISIT FOR SCREENING MAMMOGRAM: Primary | ICD-10-CM

## 2023-08-07 ENCOUNTER — HOSPITAL ENCOUNTER (OUTPATIENT)
Dept: MAMMOGRAPHY | Facility: HOSPITAL | Age: 68
Discharge: HOME | End: 2023-08-07
Admitting: NURSE PRACTITIONER
Payer: MEDICARE

## 2023-08-07 DIAGNOSIS — Z12.31 VISIT FOR SCREENING MAMMOGRAM: ICD-10-CM

## 2023-08-07 PROCEDURE — 77067 SCR MAMMO BI INCL CAD: CPT

## 2023-08-07 PROCEDURE — 77063 BREAST TOMOSYNTHESIS BI: CPT | Performed by: RADIOLOGY

## 2023-08-07 PROCEDURE — 77063 BREAST TOMOSYNTHESIS BI: CPT

## 2023-08-07 PROCEDURE — 77067 SCR MAMMO BI INCL CAD: CPT | Performed by: RADIOLOGY

## 2023-08-09 ENCOUNTER — TRANSCRIBE ORDERS (OUTPATIENT)
Dept: ADMINISTRATIVE | Facility: HOSPITAL | Age: 68
End: 2023-08-09
Payer: MEDICARE

## 2023-08-09 DIAGNOSIS — Z13.820 SPECIAL SCREENING FOR OSTEOPOROSIS: Primary | ICD-10-CM

## 2023-08-14 ENCOUNTER — TRANSCRIBE ORDERS (OUTPATIENT)
Dept: ADMINISTRATIVE | Facility: HOSPITAL | Age: 68
End: 2023-08-14
Payer: MEDICARE

## 2023-08-14 DIAGNOSIS — Z91.89 OTHER SPECIFIED PERSONAL RISK FACTORS, NOT ELSEWHERE CLASSIFIED: Primary | ICD-10-CM

## 2023-09-07 ENCOUNTER — TRANSCRIBE ORDERS (OUTPATIENT)
Dept: ADMINISTRATIVE | Facility: HOSPITAL | Age: 68
End: 2023-09-07
Payer: MEDICARE

## 2023-09-07 DIAGNOSIS — Z78.0 ENCOUNTER FOR OSTEOPOROSIS SCREENING IN ASYMPTOMATIC POSTMENOPAUSAL PATIENT: ICD-10-CM

## 2023-09-07 DIAGNOSIS — Z13.820 ENCOUNTER FOR OSTEOPOROSIS SCREENING IN ASYMPTOMATIC POSTMENOPAUSAL PATIENT: ICD-10-CM

## 2023-09-07 DIAGNOSIS — Z91.89 OTHER SPECIFIED PERSONAL RISK FACTORS, NOT ELSEWHERE CLASSIFIED: Primary | ICD-10-CM

## 2023-09-15 DIAGNOSIS — R61 HYPERHIDROSIS: Chronic | ICD-10-CM

## 2023-09-15 RX ORDER — GLYCOPYRROLATE 1 MG/1
TABLET ORAL
Qty: 90 TABLET | Refills: 1 | OUTPATIENT
Start: 2023-09-15

## 2023-11-07 ENCOUNTER — OFFICE VISIT (OUTPATIENT)
Dept: FAMILY MEDICINE CLINIC | Facility: CLINIC | Age: 68
End: 2023-11-07
Payer: MEDICARE

## 2023-11-07 VITALS
HEIGHT: 65 IN | OXYGEN SATURATION: 97 % | TEMPERATURE: 97.7 F | HEART RATE: 77 BPM | BODY MASS INDEX: 27.49 KG/M2 | DIASTOLIC BLOOD PRESSURE: 74 MMHG | WEIGHT: 165 LBS | SYSTOLIC BLOOD PRESSURE: 120 MMHG

## 2023-11-07 DIAGNOSIS — I10 BENIGN ESSENTIAL HYPERTENSION: ICD-10-CM

## 2023-11-07 DIAGNOSIS — E78.2 MIXED HYPERLIPIDEMIA: ICD-10-CM

## 2023-11-07 DIAGNOSIS — F51.04 PSYCHOPHYSIOLOGICAL INSOMNIA: Primary | ICD-10-CM

## 2023-11-07 DIAGNOSIS — K21.9 GASTROESOPHAGEAL REFLUX DISEASE WITHOUT ESOPHAGITIS: Chronic | ICD-10-CM

## 2023-11-07 DIAGNOSIS — E03.4 HYPOTHYROIDISM DUE TO ACQUIRED ATROPHY OF THYROID: Chronic | ICD-10-CM

## 2023-11-07 DIAGNOSIS — F41.1 GENERALIZED ANXIETY DISORDER: Chronic | ICD-10-CM

## 2023-11-07 DIAGNOSIS — R73.9 HYPERGLYCEMIA: ICD-10-CM

## 2023-11-07 PROCEDURE — 1159F MED LIST DOCD IN RCRD: CPT | Performed by: PHYSICIAN ASSISTANT

## 2023-11-07 PROCEDURE — 99214 OFFICE O/P EST MOD 30 MIN: CPT | Performed by: PHYSICIAN ASSISTANT

## 2023-11-07 PROCEDURE — 3078F DIAST BP <80 MM HG: CPT | Performed by: PHYSICIAN ASSISTANT

## 2023-11-07 PROCEDURE — 1160F RVW MEDS BY RX/DR IN RCRD: CPT | Performed by: PHYSICIAN ASSISTANT

## 2023-11-07 PROCEDURE — 3074F SYST BP LT 130 MM HG: CPT | Performed by: PHYSICIAN ASSISTANT

## 2023-11-07 RX ORDER — LOSARTAN POTASSIUM 100 MG/1
100 TABLET ORAL DAILY
Qty: 90 TABLET | Refills: 3 | Status: SHIPPED | OUTPATIENT
Start: 2023-11-07

## 2023-11-07 RX ORDER — AMITRIPTYLINE HYDROCHLORIDE 100 MG/1
100 TABLET ORAL NIGHTLY
Qty: 90 TABLET | Refills: 3 | Status: SHIPPED | OUTPATIENT
Start: 2023-11-07

## 2023-11-07 RX ORDER — HYDROXYZINE 50 MG/1
50 TABLET, FILM COATED ORAL EVERY 8 HOURS PRN
Qty: 270 TABLET | Refills: 3 | Status: SHIPPED | OUTPATIENT
Start: 2023-11-07

## 2023-11-07 RX ORDER — FENOFIBRATE 145 MG/1
145 TABLET, COATED ORAL DAILY
Qty: 90 TABLET | Refills: 3 | Status: SHIPPED | OUTPATIENT
Start: 2023-11-07

## 2023-11-07 RX ORDER — ROSUVASTATIN CALCIUM 20 MG/1
20 TABLET, COATED ORAL NIGHTLY
Qty: 90 TABLET | Refills: 3 | Status: SHIPPED | OUTPATIENT
Start: 2023-11-07

## 2023-11-07 RX ORDER — ESCITALOPRAM OXALATE 5 MG/1
5 TABLET ORAL DAILY
Qty: 90 TABLET | Refills: 3 | Status: SHIPPED | OUTPATIENT
Start: 2023-11-07

## 2023-11-07 RX ORDER — FAMOTIDINE 40 MG/1
40 TABLET, FILM COATED ORAL DAILY
Qty: 90 TABLET | Refills: 3 | Status: SHIPPED | OUTPATIENT
Start: 2023-11-07

## 2023-11-07 RX ORDER — LEVOTHYROXINE SODIUM 0.12 MG/1
125 TABLET ORAL DAILY
Qty: 90 TABLET | Refills: 3 | Status: SHIPPED | OUTPATIENT
Start: 2023-11-07

## 2023-11-07 NOTE — PROGRESS NOTES
"Chief Complaint -insomnia    History of Present Illness -     June Melchor is a 68 y.o. female.     Insomnia-  Stable with hydroxyzine and amitriptyline    Generalized anxiety disorder-stable with escitalopram.  She denies any hallucinations SI or HI    Gastroesophageal reflux disease-  Stable with Pepcid and dietary modification    Hyperlipidemia-  Stable with fenofibrate    Hypertension-  Stable with losartan    Hypothyroidism-  Patient reports losartan was increased to 125 that she went to a different medical provider since the last visit.  She has not had thyroid levels checked since the increased dose of Synthroid    The following portions of the patient's history were reviewed and updated as appropriate: allergies, current medications, past family history, past medical history, past social history, past surgical history and problem list.        Objective  Vital signs:  /74   Pulse 77   Temp 97.7 °F (36.5 °C) (Temporal)   Ht 165.1 cm (65\")   Wt 74.8 kg (165 lb)   SpO2 97%   BMI 27.46 kg/m²     Physical Exam  Vitals and nursing note reviewed.   Constitutional:       Appearance: Normal appearance. She is well-developed.   Eyes:      Extraocular Movements: Extraocular movements intact.      Conjunctiva/sclera: Conjunctivae normal.   Cardiovascular:      Rate and Rhythm: Normal rate and regular rhythm.      Heart sounds: Normal heart sounds. No murmur heard.  Pulmonary:      Effort: Pulmonary effort is normal. No respiratory distress.      Breath sounds: Normal breath sounds. No wheezing.   Musculoskeletal:         General: No tenderness.   Skin:     General: Skin is warm and dry.      Findings: No rash.   Neurological:      Mental Status: She is alert and oriented to person, place, and time.   Psychiatric:         Mood and Affect: Mood normal.         Behavior: Behavior normal.         Thought Content: Thought content normal.         The following data was reviewed by Paulette Wallis PA-C:         Lab " Results   Component Value Date    BUN 30 (H) 02/17/2023    CREATININE 0.95 02/17/2023    EGFR 65.0 02/17/2023    ALT 17 02/17/2023    AST 24 02/17/2023    WBC 5.66 06/22/2018    HGB 14.6 06/22/2018    HCT 41.4 06/22/2018     06/22/2018    CHOL 135 09/02/2022    TRIG 69 09/02/2022    HDL 58 09/02/2022    LDL 63 09/02/2022    TSH 2.100 02/17/2023    HGBA1C 5.20 05/31/2019           Assessment & Plan     Diagnoses and all orders for this visit:    1. Psychophysiological insomnia (Primary)  Comments:  Continue amitriptyline 100 mg nightly  Orders:  -     amitriptyline (ELAVIL) 100 MG tablet; Take 1 tablet by mouth Every Night.  Dispense: 90 tablet; Refill: 3  -     hydrOXYzine (ATARAX) 50 MG tablet; Take 1 tablet by mouth Every 8 (Eight) Hours As Needed for Anxiety.  Dispense: 270 tablet; Refill: 3    2. Generalized anxiety disorder  Comments:  Continue escitalopram  Orders:  -     escitalopram (Lexapro) 5 MG tablet; Take 1 tablet by mouth Daily.  Dispense: 90 tablet; Refill: 3  -     TSH; Future    3. Gastroesophageal reflux disease without esophagitis  Comments:  Discontinue omeprazole   Continue Pepcid  Advised to avoid known trigger foods  Orders:  -     famotidine (Pepcid) 40 MG tablet; Take 1 tablet by mouth Daily.  Dispense: 90 tablet; Refill: 3    4. Mixed hyperlipidemia  Comments:  Advised low-cholesterol diet  Continue fenofibrate  Orders:  -     fenofibrate (TRICOR) 145 MG tablet; Take 1 tablet by mouth Daily.  Dispense: 90 tablet; Refill: 3  -     rosuvastatin (CRESTOR) 20 MG tablet; Take 1 tablet by mouth Every Night.  Dispense: 90 tablet; Refill: 3  -     Comprehensive Metabolic Panel; Future  -     Lipid Panel; Future  -     Hemoglobin A1c; Future    5. Benign essential hypertension  Comments:  Continue to monitor daily blood pressure and pulse   Continue losartan  Orders:  -     losartan (COZAAR) 100 MG tablet; Take 1 tablet by mouth Daily.  Dispense: 90 tablet; Refill: 3  -     Comprehensive  Metabolic Panel; Future  -     Lipid Panel; Future  -     Hemoglobin A1c; Future    6. Hypothyroidism due to acquired atrophy of thyroid  Comments:  Ordered lab work to reevaluate the Synthroid 125 mcg dose  Orders:  -     levothyroxine (Synthroid) 125 MCG tablet; Take 1 tablet by mouth Daily.  Dispense: 90 tablet; Refill: 3  -     TSH; Future  -     T4, Free; Future    7. Hyperglycemia  -     Hemoglobin A1c; Future        BMI is >= 25 and <30. (Overweight) The following options were offered after discussion;: exercise counseling/recommendations and nutrition counseling/recommendations          Patient was given instructions and counseling regarding his condition or for health maintenance advice. Please see specific information pulled into the AVS if appropriate      This document has been electronically signed by:  Paulette Wallis PA-C

## 2023-11-07 NOTE — PATIENT INSTRUCTIONS
"Fat and Cholesterol Restricted Eating Plan  Getting too much fat and cholesterol in your diet may cause health problems. Choosing the right foods helps keep your fat and cholesterol at normal levels. This can keep you from getting certain diseases.  Your doctor may recommend an eating plan that includes:  Total fat: ______% or less of total calories a day. This is ______g of fat a day.  Saturated fat: ______% or less of total calories a day. This is ______g of saturated fat a day.  Cholesterol: less than _________mg a day.  Fiber: ______g a day.  What are tips for following this plan?  General tips  Work with your doctor to lose weight if you need to.  Avoid:  Foods with added sugar.  Fried foods.  Foods with trans fat or partially hydrogenated oils. This includes some margarines and baked goods.  If you drink alcohol:  Limit how much you have to:  0-1 drink a day for women who are not pregnant.  0-2 drinks a day for men.  Know how much alcohol is in a drink. In the [place] one drink equals one 12 oz bottle of beer (355 mL), one 5 oz glass of wine (148 mL), or one 1½ oz glass of hard liquor (44 mL).  Reading food labels  Check food labels for:  Trans fats.  Partially hydrogenated oils.  Saturated fat (g) in each serving.  Cholesterol (mg) in each serving.  Fiber (g) in each serving.  Choose foods with healthy fats, such as:  Monounsaturated fats and polyunsaturated fats. These include olive and canola oil, flaxseeds, walnuts, almonds, and seeds.  Omega-3 fats. These are found in certain fish, flaxseed oil, and ground flaxseeds.  Choose grain products that have whole grains. Look for the word \"whole\" as the first word in the ingredient list.  Cooking  Cook foods using low-fat methods. These include baking, boiling, grilling, and broiling.  Eat more home-cooked foods. Eat at restaurants and buffets less often. Eat less fast food.  Avoid cooking using saturated fats, such as butter, cream, palm oil, palm kernel oil, and " coconut oil.  Meal planning    At meals, divide your plate into four equal parts:  Fill one-half of your plate with vegetables, green salads, and fruit.  Fill one-fourth of your plate with whole grains.  Fill one-fourth of your plate with low-fat (lean) protein foods.  Eat fish that is high in omega-3 fats at least two times a week. This includes mackerel, tuna, sardines, and salmon.  Eat foods that are high in fiber, such as whole grains, beans, apples, pears, berries, broccoli, carrots, peas, and barley.  What foods should I eat?  Fruits  All fresh, canned (in natural juice), or frozen fruits.  Vegetables  Fresh or frozen vegetables (raw, steamed, roasted, or grilled). Green salads.  Grains  Whole grains, such as whole wheat or whole grain breads, crackers, cereals, and pasta. Unsweetened oatmeal, bulgur, barley, quinoa, or brown rice. Corn or whole wheat flour tortillas.  Meats and other protein foods  Ground beef (85% or leaner), grass-fed beef, or beef trimmed of fat. Skinless chicken or turkey. Ground chicken or turkey. Pork trimmed of fat. All fish and seafood. Egg whites. Dried beans, peas, or lentils. Unsalted nuts or seeds. Unsalted canned beans. Nut butters without added sugar or oil.  Dairy  Low-fat or nonfat dairy products, such as skim or 1% milk, 2% or reduced-fat cheeses, low-fat and fat-free ricotta or cottage cheese, or plain low-fat and nonfat yogurt.  Fats and oils  Tub margarine without trans fats. Light or reduced-fat mayonnaise and salad dressings. Avocado. Olive, canola, sesame, or safflower oils.  The items listed above may not be a complete list of foods and beverages you can eat. Contact a dietitian for more information.  What foods should I avoid?  Fruits  Canned fruit in heavy syrup. Fruit in cream or butter sauce. Fried fruit.  Vegetables  Vegetables cooked in cheese, cream, or butter sauce. Fried vegetables.  Grains  White bread. White pasta. White rice. Cornbread. Bagels, pastries,  and croissants. Crackers and snack foods that contain trans fat and hydrogenated oils.  Meats and other protein foods  Fatty cuts of meat. Ribs, chicken wings, morfin, sausage, bologna, salami, chitterlings, fatback, hot dogs, bratwurst, and packaged lunch meats. Liver and organ meats. Whole eggs and egg yolks. Chicken and turkey with skin. Fried meat.  Dairy  Whole or 2% milk, cream, half-and-half, and cream cheese. Whole milk cheeses. Whole-fat or sweetened yogurt. Full-fat cheeses. Nondairy creamers and whipped toppings. Processed cheese, cheese spreads, and cheese curds.  Fats and oils  Butter, stick margarine, lard, shortening, ghee, or morfin fat. Coconut, palm kernel, and palm oils.  Beverages  Alcohol. Sugar-sweetened drinks such as sodas, lemonade, and fruit drinks.  Sweets and desserts  Corn syrup, sugars, honey, and molasses. Candy. Jam and jelly. Syrup. Sweetened cereals. Cookies, pies, cakes, donuts, muffins, and ice cream.  The items listed above may not be a complete list of foods and beverages you should avoid. Contact a dietitian for more information.  Summary  Choosing the right foods helps keep your fat and cholesterol at normal levels. This can keep you from getting certain diseases.  At meals, fill one-half of your plate with vegetables, green salads, and fruits.  Eat high fiber foods, like whole grains, beans, apples, pears, berries, carrots, peas, and barley.  Limit added sugar, saturated fats, alcohol, and fried foods.  This information is not intended to replace advice given to you by your health care provider. Make sure you discuss any questions you have with your health care provider.  Document Revised: 04/29/2022 Document Reviewed: 04/29/2022  Elsevier Patient Education © 2023 [employer]

## 2024-01-30 ENCOUNTER — LAB (OUTPATIENT)
Dept: FAMILY MEDICINE CLINIC | Facility: CLINIC | Age: 69
End: 2024-01-30
Payer: MEDICARE

## 2024-01-30 DIAGNOSIS — F41.1 GENERALIZED ANXIETY DISORDER: Chronic | ICD-10-CM

## 2024-01-30 DIAGNOSIS — I10 BENIGN ESSENTIAL HYPERTENSION: ICD-10-CM

## 2024-01-30 DIAGNOSIS — E03.4 HYPOTHYROIDISM DUE TO ACQUIRED ATROPHY OF THYROID: Chronic | ICD-10-CM

## 2024-01-30 DIAGNOSIS — E78.2 MIXED HYPERLIPIDEMIA: ICD-10-CM

## 2024-01-30 DIAGNOSIS — R73.9 HYPERGLYCEMIA: ICD-10-CM

## 2024-01-30 LAB
ALBUMIN SERPL-MCNC: 4.3 G/DL (ref 3.5–5.2)
ALBUMIN/GLOB SERPL: 1.8 G/DL
ALP SERPL-CCNC: 82 U/L (ref 39–117)
ALT SERPL W P-5'-P-CCNC: 13 U/L (ref 1–33)
ANION GAP SERPL CALCULATED.3IONS-SCNC: 11 MMOL/L (ref 5–15)
AST SERPL-CCNC: 22 U/L (ref 1–32)
BILIRUB SERPL-MCNC: 0.5 MG/DL (ref 0–1.2)
BUN SERPL-MCNC: 27 MG/DL (ref 8–23)
BUN/CREAT SERPL: 30 (ref 7–25)
CALCIUM SPEC-SCNC: 9.5 MG/DL (ref 8.6–10.5)
CHLORIDE SERPL-SCNC: 107 MMOL/L (ref 98–107)
CHOLEST SERPL-MCNC: 158 MG/DL (ref 0–200)
CO2 SERPL-SCNC: 26 MMOL/L (ref 22–29)
CREAT SERPL-MCNC: 0.9 MG/DL (ref 0.57–1)
EGFRCR SERPLBLD CKD-EPI 2021: 68.9 ML/MIN/1.73
GLOBULIN UR ELPH-MCNC: 2.4 GM/DL
GLUCOSE SERPL-MCNC: 92 MG/DL (ref 65–99)
HBA1C MFR BLD: 5 % (ref 4.8–5.6)
HDLC SERPL-MCNC: 59 MG/DL (ref 40–60)
LDLC SERPL CALC-MCNC: 81 MG/DL (ref 0–100)
LDLC/HDLC SERPL: 1.34 {RATIO}
POTASSIUM SERPL-SCNC: 4.1 MMOL/L (ref 3.5–5.2)
PROT SERPL-MCNC: 6.7 G/DL (ref 6–8.5)
SODIUM SERPL-SCNC: 144 MMOL/L (ref 136–145)
T4 FREE SERPL-MCNC: 1.41 NG/DL (ref 0.93–1.7)
TRIGL SERPL-MCNC: 100 MG/DL (ref 0–150)
TSH SERPL DL<=0.05 MIU/L-ACNC: 0.69 UIU/ML (ref 0.27–4.2)
VLDLC SERPL-MCNC: 18 MG/DL (ref 5–40)

## 2024-01-30 PROCEDURE — 83036 HEMOGLOBIN GLYCOSYLATED A1C: CPT | Performed by: PHYSICIAN ASSISTANT

## 2024-01-30 PROCEDURE — 84439 ASSAY OF FREE THYROXINE: CPT | Performed by: PHYSICIAN ASSISTANT

## 2024-01-30 PROCEDURE — 80061 LIPID PANEL: CPT | Performed by: PHYSICIAN ASSISTANT

## 2024-01-30 PROCEDURE — 84443 ASSAY THYROID STIM HORMONE: CPT | Performed by: PHYSICIAN ASSISTANT

## 2024-01-30 PROCEDURE — 80053 COMPREHEN METABOLIC PANEL: CPT | Performed by: PHYSICIAN ASSISTANT

## 2024-02-06 ENCOUNTER — OFFICE VISIT (OUTPATIENT)
Dept: FAMILY MEDICINE CLINIC | Facility: CLINIC | Age: 69
End: 2024-02-06
Payer: MEDICARE

## 2024-02-06 VITALS
OXYGEN SATURATION: 95 % | HEART RATE: 67 BPM | DIASTOLIC BLOOD PRESSURE: 90 MMHG | TEMPERATURE: 97.7 F | BODY MASS INDEX: 27.99 KG/M2 | HEIGHT: 65 IN | SYSTOLIC BLOOD PRESSURE: 140 MMHG | WEIGHT: 168 LBS

## 2024-02-06 DIAGNOSIS — R61 HYPERHIDROSIS: Chronic | ICD-10-CM

## 2024-02-06 DIAGNOSIS — Z91.81 AT MODERATE RISK FOR FALL: ICD-10-CM

## 2024-02-06 DIAGNOSIS — F41.1 GENERALIZED ANXIETY DISORDER: Primary | Chronic | ICD-10-CM

## 2024-02-06 DIAGNOSIS — Z00.00 MEDICARE ANNUAL WELLNESS VISIT, SUBSEQUENT: ICD-10-CM

## 2024-02-06 PROCEDURE — G0439 PPPS, SUBSEQ VISIT: HCPCS | Performed by: PHYSICIAN ASSISTANT

## 2024-02-06 PROCEDURE — 99213 OFFICE O/P EST LOW 20 MIN: CPT | Performed by: PHYSICIAN ASSISTANT

## 2024-02-06 PROCEDURE — 1160F RVW MEDS BY RX/DR IN RCRD: CPT | Performed by: PHYSICIAN ASSISTANT

## 2024-02-06 PROCEDURE — 3080F DIAST BP >= 90 MM HG: CPT | Performed by: PHYSICIAN ASSISTANT

## 2024-02-06 PROCEDURE — 3077F SYST BP >= 140 MM HG: CPT | Performed by: PHYSICIAN ASSISTANT

## 2024-02-06 PROCEDURE — 1159F MED LIST DOCD IN RCRD: CPT | Performed by: PHYSICIAN ASSISTANT

## 2024-02-06 PROCEDURE — 1170F FXNL STATUS ASSESSED: CPT | Performed by: PHYSICIAN ASSISTANT

## 2024-02-06 RX ORDER — GLYCOPYRROLATE 1 MG/1
1 TABLET ORAL DAILY
Qty: 90 TABLET | Refills: 3 | Status: SHIPPED | OUTPATIENT
Start: 2024-02-06

## 2024-02-06 RX ORDER — GLYCOPYRROLATE 1 MG/1
1 TABLET ORAL 3 TIMES DAILY
COMMUNITY
End: 2024-02-06 | Stop reason: SDUPTHER

## 2024-02-06 RX ORDER — ESCITALOPRAM OXALATE 10 MG/1
10 TABLET ORAL DAILY
Qty: 90 TABLET | Refills: 3 | Status: SHIPPED | OUTPATIENT
Start: 2024-02-06

## 2024-02-06 NOTE — PROGRESS NOTES
"The ABCs of the Annual Wellness Visit  Subsequent Medicare Wellness Visit    Subjective    {Wrapup  Review (Popup)  Advance Care Planning  Labs  CC  Problem List  Visit Diagnosis  Medications  Result Review  Imaging  [facility]  [facility]  [facility]  Snap[employer]  Encounters  Notes  Media  Procedures :23}  June Melchor is a 68 y.o. female who presents for a Subsequent Medicare Wellness Visit.    The following portions of the patient's history were reviewed and   updated as appropriate: {history reviewed:20406::\"allergies\",\"current medications\",\"past family history\",\"past medical history\",\"past social history\",\"past surgical history\",\"problem list\"}.    Compared to one year ago, the patient feels her physical   health is {better worse same:00526}.    Compared to one year ago, the patient feels her mental   health is {better worse same:81373}.    Recent Hospitalizations:  {Hospital Admission Status in the last 365 days:17018}      Current Medical Providers:  Patient Care Team:  Paulette Wallis PA as PCP - General (Family Medicine)    Outpatient Medications Prior to Visit   Medication Sig Dispense Refill    amitriptyline (ELAVIL) 100 MG tablet Take 1 tablet by mouth Every Night. 90 tablet 3    escitalopram (Lexapro) 5 MG tablet Take 1 tablet by mouth Daily. 90 tablet 3    famotidine (Pepcid) 40 MG tablet Take 1 tablet by mouth Daily. 90 tablet 3    fenofibrate (TRICOR) 145 MG tablet Take 1 tablet by mouth Daily. 90 tablet 3    hydrOXYzine (ATARAX) 50 MG tablet Take 1 tablet by mouth Every 8 (Eight) Hours As Needed for Anxiety. 270 tablet 3    levothyroxine (Synthroid) 125 MCG tablet Take 1 tablet by mouth Daily. 90 tablet 3    losartan (COZAAR) 100 MG tablet Take 1 tablet by mouth Daily. 90 tablet 3    rosuvastatin (CRESTOR) 20 MG tablet Take 1 tablet by mouth Every Night. 90 tablet 3     No facility-administered medications prior to visit.       No opioid medication identified " "on active medication list. I have reviewed chart for other potential  high risk medication/s and harmful drug interactions in the elderly.        Aspirin is not on active medication list.  {ASPIRIN NOT ON MEDICATION LIST INDICATED/NOT INDICATED:11954}.    Patient Active Problem List   Diagnosis    Hyperlipidemia    Benign essential hypertension    Gastroesophageal reflux disease without esophagitis    Generalized anxiety disorder    Primary insomnia    Menopausal and postmenopausal disorder    Major depression, chronic    Family history of colon cancer    Family history of polyps in the colon    Hyperhidrosis    Chronic constipation    Bulge of cervical disc without myelopathy    Bulging of lumbar intervertebral disc    DDD (degenerative disc disease), lumbar    Hypothyroidism    Prediabetes     Advance Care Planning   Advance Care Planning     Advance Directive is not on file.  {ACP Discussion, Advance Directive not in EMR:70013}     Objective    There were no vitals filed for this visit.  Estimated body mass index is 27.46 kg/m² as calculated from the following:    Height as of 11/7/23: 165.1 cm (65\").    Weight as of 11/7/23: 74.8 kg (165 lb).           Does the patient have evidence of cognitive impairment?   {Yes/No:79103}    Lab Results   Component Value Date    TRIG 100 01/30/2024    HDL 59 01/30/2024    LDL 81 01/30/2024    VLDL 18 01/30/2024    HGBA1C 5.00 01/30/2024          HEALTH RISK ASSESSMENT    Smoking Status:  Social History     Tobacco Use   Smoking Status Former    Passive exposure: Past   Smokeless Tobacco Never     Alcohol Consumption:  Social History     Substance and Sexual Activity   Alcohol Use No     Fall Risk Screen:    STEADI Fall Risk Assessment has not been completed.    Depression Screening:      3/24/2023    12:39 PM   PHQ-2/PHQ-9 Depression Screening   Little Interest or Pleasure in Doing Things 0-->not at all   Feeling Down, Depressed or Hopeless 0-->not at all   PHQ-9: Brief " Depression Severity Measure Score 0       Health Habits and Functional and Cognitive Screenin/26/2022     2:27 PM   Functional & Cognitive Status   Do you have difficulty preparing food and eating? No   Do you have difficulty bathing yourself, getting dressed or grooming yourself? No   Do you have difficulty using the toilet? No   Do you have difficulty moving around from place to place? No   Do you have trouble with steps or getting out of a bed or a chair? No   Current Diet Well Balanced Diet   Dental Exam Up to date   Eye Exam Up to date   Exercise (times per week) 7 times per week   Current Exercises Include Walking;House Cleaning   Do you need help using the phone?  No   Are you deaf or do you have serious difficulty hearing?  No   Do you need help to go to places out of walking distance? No   Do you need help shopping? No   Do you need help preparing meals?  No   Do you need help with housework?  No   Do you need help with laundry? No   Do you need help taking your medications? No   Do you need help managing money? No   Do you ever drive or ride in a car without wearing a seat belt? No   Have you felt unusual stress, anger or loneliness in the last month? No   Who do you live with? Alone   If you need help, do you have trouble finding someone available to you? No   Have you been bothered in the last four weeks by sexual problems? No   Do you have difficulty concentrating, remembering or making decisions? No       Age-appropriate Screening Schedule:  Refer to the list below for future screening recommendations based on patient's age, sex and/or medical conditions. Orders for these recommended tests are listed in the plan section. The patient has been provided with a written plan.    Health Maintenance   Topic Date Due    RSV Vaccine - Adults >60 Years or Pregnant 32-36 Weeks (1 - 1-dose 60+ series) Never done    ZOSTER VACCINE (2 of 2) 2016    COLORECTAL CANCER SCREENING  2017    COVID-19  Vaccine (4 - 2023-24 season) 09/01/2023    ANNUAL WELLNESS VISIT  09/26/2023    DXA SCAN  08/04/2024    MAMMOGRAM  08/07/2024    BMI FOLLOWUP  11/07/2024    LIPID PANEL  01/30/2025    TDAP/TD VACCINES (3 - Td or Tdap) 03/27/2033    HEPATITIS C SCREENING  Completed    INFLUENZA VACCINE  Completed    Pneumococcal Vaccine 65+  Completed    PAP SMEAR  Discontinued                  CMS Preventative Services Quick Reference  Risk Factors Identified During Encounter:    {Medicare Wellness Risk Factors:73244}    The above risks/problems have been discussed with the patient.  Pertinent information has been shared with the patient in the After Visit Summary.    There are no diagnoses linked to this encounter.    Follow Up:   Next Medicare Wellness visit to be scheduled in 1 year.      An After Visit Summary and PPPS were made available to the patient.

## 2024-02-06 NOTE — PROGRESS NOTES
The ABCs of the Annual Wellness Visit  Subsequent Medicare Wellness Visit    Subjective    June Melchor is a 68 y.o. female who presents for a Subsequent Medicare Wellness Visit.    The following portions of the patient's history were reviewed and   updated as appropriate: allergies, current medications, past family history, past medical history, past social history, past surgical history, and problem list.    Compared to one year ago, the patient feels her physical   health is the same.    Compared to one year ago, the patient feels her mental   health is the same.    Recent Hospitalizations:  She was not admitted to the hospital during the last year.       Current Medical Providers:  Patient Care Team:  Paulette Wallis PA as PCP - General (Family Medicine)    Outpatient Medications Prior to Visit   Medication Sig Dispense Refill    amitriptyline (ELAVIL) 100 MG tablet Take 1 tablet by mouth Every Night. 90 tablet 3    famotidine (Pepcid) 40 MG tablet Take 1 tablet by mouth Daily. 90 tablet 3    fenofibrate (TRICOR) 145 MG tablet Take 1 tablet by mouth Daily. 90 tablet 3    hydrOXYzine (ATARAX) 50 MG tablet Take 1 tablet by mouth Every 8 (Eight) Hours As Needed for Anxiety. 270 tablet 3    losartan (COZAAR) 100 MG tablet Take 1 tablet by mouth Daily. 90 tablet 3    rosuvastatin (CRESTOR) 20 MG tablet Take 1 tablet by mouth Every Night. 90 tablet 3    escitalopram (Lexapro) 5 MG tablet Take 1 tablet by mouth Daily. 90 tablet 3    glycopyrrolate (ROBINUL) 1 MG tablet Take 1 tablet by mouth 3 (Three) Times a Day.      levothyroxine (Synthroid) 125 MCG tablet Take 1 tablet by mouth Daily. 90 tablet 3     No facility-administered medications prior to visit.       No opioid medication identified on active medication list. I have reviewed chart for other potential  high risk medication/s and harmful drug interactions in the elderly.        Aspirin is not on active medication list.  Aspirin use is not indicated  "based on review of current medical condition/s. Risk of harm outweighs potential benefits.  .    Patient Active Problem List   Diagnosis    Hyperlipidemia    Benign essential hypertension    Gastroesophageal reflux disease without esophagitis    Generalized anxiety disorder    Primary insomnia    Menopausal and postmenopausal disorder    Major depression, chronic    Family history of colon cancer    Family history of polyps in the colon    Hyperhidrosis    Chronic constipation    Bulge of cervical disc without myelopathy    Bulging of lumbar intervertebral disc    DDD (degenerative disc disease), lumbar    Hypothyroidism    Prediabetes     Advance Care Planning   Advance Care Planning     Advance Directive is not on file.  ACP discussion was held with the patient during this visit. Patient does not have an advance directive, information provided.     Objective    Vitals:    24 1225   BP: 140/90   Pulse: 67   Temp: 97.7 °F (36.5 °C)   TempSrc: Temporal   SpO2: 95%   Weight: 76.2 kg (168 lb)   Height: 165.1 cm (65\")   PainSc: 0-No pain     Estimated body mass index is 27.96 kg/m² as calculated from the following:    Height as of this encounter: 165.1 cm (65\").    Weight as of this encounter: 76.2 kg (168 lb).           Does the patient have evidence of cognitive impairment? No    Lab Results   Component Value Date    TRIG 100 2024    HDL 59 2024    LDL 81 2024    VLDL 18 2024    HGBA1C 5.00 2024        HEALTH RISK ASSESSMENT    Smoking Status:  Social History     Tobacco Use   Smoking Status Former    Passive exposure: Past   Smokeless Tobacco Never     Alcohol Consumption:  Social History     Substance and Sexual Activity   Alcohol Use No     Fall Risk Screen:    STEADI Fall Risk Assessment was completed, and patient is at MODERATE risk for falls. Assessment completed on:2024    Depression Screenin/6/2024    12:31 PM   PHQ-2/PHQ-9 Depression Screening   Little Interest " or Pleasure in Doing Things 0-->not at all   Feeling Down, Depressed or Hopeless 0-->not at all   PHQ-9: Brief Depression Severity Measure Score 0       Health Habits and Functional and Cognitive Screenin/6/2024    12:31 PM   Functional & Cognitive Status   Do you have difficulty preparing food and eating? No   Do you have difficulty bathing yourself, getting dressed or grooming yourself? No   Do you have difficulty using the toilet? No   Do you have difficulty moving around from place to place? No   Do you have trouble with steps or getting out of a bed or a chair? No   Current Diet Well Balanced Diet   Dental Exam Not up to date   Eye Exam Up to date   Exercise (times per week) 7 times per week   Current Exercises Include Walking   Do you need help using the phone?  No   Are you deaf or do you have serious difficulty hearing?  No   Do you need help to go to places out of walking distance? No   Do you need help shopping? No   Do you need help preparing meals?  No   Do you need help with housework?  No   Do you need help with laundry? No   Do you need help taking your medications? No   Do you need help managing money? No   Do you ever drive or ride in a car without wearing a seat belt? No   Have you felt unusual stress, anger or loneliness in the last month? No   Who do you live with? Alone   If you need help, do you have trouble finding someone available to you? No   Have you been bothered in the last four weeks by sexual problems? No   Do you have difficulty concentrating, remembering or making decisions? Yes       Age-appropriate Screening Schedule:  Refer to the list below for future screening recommendations based on patient's age, sex and/or medical conditions. Orders for these recommended tests are listed in the plan section. The patient has been provided with a written plan.    Health Maintenance   Topic Date Due    RSV Vaccine - Adults >60 Years or Pregnant 32-36 Weeks (1 - 1-dose 60+ series) Never  "done    ZOSTER VACCINE (2 of 2) 04/01/2016    COVID-19 Vaccine (4 - 2023-24 season) 09/01/2023    COLORECTAL CANCER SCREENING  02/03/2026 (Originally 1955)    DXA SCAN  08/04/2024    MAMMOGRAM  08/07/2024    BMI FOLLOWUP  11/07/2024    LIPID PANEL  01/30/2025    ANNUAL WELLNESS VISIT  02/06/2025    TDAP/TD VACCINES (3 - Td or Tdap) 03/27/2033    HEPATITIS C SCREENING  Completed    INFLUENZA VACCINE  Completed    Pneumococcal Vaccine 65+  Completed    PAP SMEAR  Discontinued                  CMS Preventative Services Quick Reference  Risk Factors Identified During Encounter  Depression/Dysphoria: Current medication adjusted.  Follow up visit planned.  Fall Risk-High or Moderate: Information on Fall Prevention Shared in After Visit Summary  Polypharmacy: Medication List reviewed  The above risks/problems have been discussed with the patient.  Pertinent information has been shared with the patient in the After Visit Summary.  An After Visit Summary and PPPS were made available to the patient.    Follow Up:   Next Medicare Wellness visit to be scheduled in 1 year.       Additional E&M Note during same encounter follows:  Patient has multiple medical problems which are significant and separately identifiable that require additional work above and beyond the Medicare Wellness Visit.      Chief Complaint  No chief complaint on file.    Subjective        HPI  June Melchor is also being seen today for anxiety.  She complains of anxiety and anhedonia.  Not at goal with the use of escitalopram 5 mg daily.  She denies any hallucinations, SI or HI.    Hot flashes-  Patient does report a decrease in hot flashes and hyperhidrosis with the use of Robinul.  She does not feel like she needs to be using it as often.         Objective   Vital Signs:  /90   Pulse 67   Temp 97.7 °F (36.5 °C) (Temporal)   Ht 165.1 cm (65\")   Wt 76.2 kg (168 lb)   SpO2 95%   BMI 27.96 kg/m²     Physical Exam  Vitals and nursing note " reviewed.   Constitutional:       Appearance: Normal appearance. She is well-developed.   Eyes:      Extraocular Movements: Extraocular movements intact.      Conjunctiva/sclera: Conjunctivae normal.   Cardiovascular:      Rate and Rhythm: Normal rate and regular rhythm.      Heart sounds: Normal heart sounds. No murmur heard.  Pulmonary:      Effort: Pulmonary effort is normal. No respiratory distress.      Breath sounds: Normal breath sounds. No wheezing.   Musculoskeletal:         General: No tenderness.   Skin:     General: Skin is warm and dry.      Findings: No rash.   Neurological:      Mental Status: She is alert and oriented to person, place, and time.   Psychiatric:         Mood and Affect: Mood normal.         Behavior: Behavior normal.         Thought Content: Thought content normal.                         Assessment and Plan   Diagnoses and all orders for this visit:    1. Generalized anxiety disorder (Primary)  Comments:  Increase escitalopram 10 mg daily  Orders:  -     escitalopram (Lexapro) 10 MG tablet; Take 1 tablet by mouth Daily.  Dispense: 90 tablet; Refill: 3    2. Hyperhidrosis  Comments:  Decrease Robinul 1 mg daily  Orders:  -     glycopyrrolate (ROBINUL) 1 MG tablet; Take 1 tablet by mouth Daily.  Dispense: 90 tablet; Refill: 3    3. Medicare annual wellness visit, subsequent  Comments:  Performed and documented today    4. At moderate risk for fall             Follow Up   Return in about 3 months (around 5/6/2024) for Followup with Paulette.  Patient was given instructions and counseling regarding her condition or for health maintenance advice. Please see specific information pulled into the AVS if appropriate.

## 2024-02-06 NOTE — PATIENT INSTRUCTIONS
Advance Care Planning and Advance Directives     You make decisions on a daily basis - decisions about where you want to live, your career, your home, your life. Perhaps one of the most important decisions you face is your choice for future medical care. Take time to talk with your family and your healthcare team and start planning today.  Advance Care Planning is a process that can help you:  Understand possible future healthcare decisions in light of your own experiences  Reflect on those decision in light of your goals and values  Discuss your decisions with those closest to you and the healthcare professionals that care for you  Make a plan by creating a document that reflects your wishes    Surrogate Decision Maker  In the event of a medical emergency, which has left you unable to communicate or to make your own decisions, you would need someone to make decisions for you.  It is important to discuss your preferences for medical treatment with this person while you are in good health.     Qualities of a surrogate decision maker:  Willing to take on this role and responsibility  Knows what you want for future medical care  Willing to follow your wishes even if they don't agree with them  Able to make difficult medical decisions under stressful circumstances    Advance Directives  These are legal documents you can create that will guide your healthcare team and decision maker(s) when needed. These documents can be stored in the electronic medical record.    Living Will - a legal document to guide your care if you have a terminal condition or a serious illness and are unable to communicate. States vary by statute in document names/types, but most forms may include one or more of the following:        -  Directions regarding life-prolonging treatments        -  Directions regarding artificially provided nutrition/hydration        -  Choosing a healthcare decision maker        -  Direction regarding organ/tissue  donation    Durable Power of  for Healthcare - this document names an -in-fact to make medical decisions for you, but it may also allow this person to make personal and financial decisions for you. Please seek the advice of an  if you need this type of document.    **Advance Directives are not required and no one may discriminate against you if you do not sign one.    Medical Orders  Many states allow specific forms/orders signed by your physician to record your wishes for medical treatment in your current state of health. This form, signed in personal communication with your physician, addresses resuscitation and other medical interventions that you may or may not want.      For more information or to schedule a time with a [facility] Advance Care Planning Facilitator contact: [facility] or call [phone] and someone will contact you directly.  Fall Prevention in the Home, Adult  Falls can cause injuries and affect people of all ages. There are many simple things that you can do to make your home safe and to help prevent falls.  If you need it, ask for help making these changes.  What actions can I take to prevent falls?  General information  Use good lighting in all rooms. Make sure to:  Replace any light bulbs that burn out.  Turn on lights if it is dark and use night-lights.  Keep items that you use often in easy-to-reach places. Lower the shelves around your home if needed.  Move furniture so that there are clear paths around it.  Do not keep throw rugs or other things on the floor that can make you trip.  If any of your floors are uneven, fix them.  Add color or contrast paint or tape to clearly vance and help you see:  Grab bars or handrails.  First and last steps of staircases.  Where the edge of each step is.  If you use a ladder or stepladder:  Make sure that it is fully opened. Do not climb a closed ladder.  Make sure the sides of the ladder are locked in  place.  Have someone hold the ladder while you use it.  Know where your pets are as you move through your home.  What can I do in the bathroom?         Keep the floor dry. Clean up any water that is on the floor right away.  Remove soap buildup in the bathtub or shower. Buildup makes bathtubs and showers slippery.  Use non-skid mats or decals on the floor of the bathtub or shower.  Attach bath mats securely with double-sided, non-slip rug tape.  If you need to sit down while you are in the shower, use a non-slip stool.  Install grab bars by the toilet and in the bathtub and shower. Do not use towel bars as grab bars.  What can I do in the bedroom?  Make sure that you have a light by your bed that is easy to reach.  Do not use any sheets or blankets on your bed that hang to the floor.  Have a firm bench or chair with side arms that you can use for support when you get dressed.  What can I do in the kitchen?  Clean up any spills right away.  If you need to reach something above you, use a sturdy step stool that has a grab bar.  Keep electrical cables out of the way.  Do not use floor polish or wax that makes floors slippery.  What can I do with my stairs?  Do not leave anything on the stairs.  Make sure that you have a light switch at the top and the bottom of the stairs. Have them installed if you do not have them.  Make sure that there are handrails on both sides of the stairs. Fix handrails that are broken or loose. Make sure that handrails are as long as the staircases.  Install non-slip stair treads on all stairs in your home if they do not have carpet.  Avoid having throw rugs at the top or bottom of stairs, or secure the rugs with carpet tape to prevent them from moving.  Choose a carpet design that does not hide the edge of steps on the stairs. Make sure that carpet is firmly attached to the stairs. Fix any carpet that is loose or worn.  What can I do on the outside of my home?  Use bright outdoor  lighting.  Repair the edges of walkways and driveways and fix any cracks. Clear paths of anything that can make you trip, such as tools or rocks.  Add color or contrast paint or tape to clearly vance and help you see high doorway thresholds.  Trim any bushes or trees on the main path into your home.  Check that handrails are securely fastened and in good repair. Both sides of all steps should have handrails.  Install guardrails along the edges of any raised decks or porches.  Have leaves, snow, and ice cleared regularly. Use sand, salt, or ice melt on walkways during winter months if you live where there is ice and snow.  In the garage, clean up any spills right away, including grease or oil spills.  What other actions can I take?  Review your medicines with your health care provider. Some medicines can make you confused or feel dizzy. This can increase your chance of falling.  Wear closed-toe shoes that fit well and support your feet. Wear shoes that have rubber soles and low heels.  Use a cane, walker, scooter, or crutches that help you move around if needed.  Talk with your provider about other ways that you can decrease your risk of falls. This may include seeing a physical therapist to learn to do exercises to improve movement and strength.  Where to find more information  Centers for Disease Control and PreventionJAMIE: cdc.gov  National [place] on Aging: elizabeth.nih.gov  National [place] on Aging: elizabeth.nih.gov  Contact a health care provider if:  You are afraid of falling at home.  You feel weak, drowsy, or dizzy at home.  You fall at home.  Get help right away if you:  Lose consciousness or have trouble moving after a fall.  Have a fall that causes a head injury.  These symptoms may be an emergency. Get help right away. Call 911.  Do not wait to see if the symptoms will go away.  Do not drive yourself to the hospital.  This information is not intended to replace advice given to you by your health care  provider. Make sure you discuss any questions you have with your health care provider.  Document Revised: 08/21/2023 Document Reviewed: 08/21/2023  Elsevier Patient Education © 2023 [employer]    Sit-to-Stand Exercise    The sit-to-stand exercise (also known as the chair stand or chair rise exercise) strengthens your lower body and helps you maintain or improve your mobility and independence. The end goal is to do the sit-to-stand exercise without using your hands. This will be easier as you become stronger. You should always talk with your health care provider before starting any exercise program, especially if you have had recent surgery.  Do the exercise exactly as told by your health care provider and adjust it as directed. It is normal to feel mild stretching, pulling, tightness, or discomfort as you do this exercise, but you should stop right away if you feel sudden pain or your pain gets worse. Do not begin doing this exercise until told by your health care provider.  What the sit-to-stand exercise does  The sit-to-stand exercise helps to strengthen the muscles in your thighs and the muscles in the center of your body that give you stability (core muscles). This exercise is especially helpful if:  You have had knee or hip surgery.  You have trouble getting up from a chair, out of a car, or off the toilet due to muscle weakness.  How to do the sit-to-stand exercise  Sit toward the front edge of a sturdy chair without armrests. Your knees should be bent and your feet should be flat on the floor and shoulder-width apart and underneath your hips.  Place your hands lightly on each side of the seat. Keep your back and neck as straight as possible, with your chest slightly forward.  Breathe in slowly. Lean forward and slightly shift your weight to the front of your feet.  Breathe out as you slowly stand up. Try not to support any weight with your hands.  Stand and pause for a full breath in and out.  Breathe in  as you sit down slowly. Tighten your core and abdominal muscles to control your lowering as much as possible. You should lower yourself back to the chair slowly, not just drop back into the seat.  Breathe out slowly.  Do this exercise 10-15 times. If needed, do it fewer times until you build up strength.  Rest for 1 minute, then do another set of 10-15 repetitions.  To change the difficulty of the sit-to-stand exercise  If the exercise is too difficult, use a chair with sturdy armrests, and push off the armrests to help you come to the standing position. You can also use the armrests to help slowly lower yourself back to sitting. As this gets easier, try to use your arms less. You can also place a firm cushion or pillow on the chair to make the surface higher.  If this exercise is too easy, do not use your arms to help raise or lower yourself. You can also wear a weighted vest, use hand weights, increase your repetitions, or try a lower chair.  General tips  You may feel tired when starting an exercise routine. This is normal.  You may have muscle soreness that lasts a few days. This is normal. As you get stronger, you may not feel muscle soreness.  Use smooth, steady movements.  Do not  hold your breath during strength exercises. This can cause unsafe changes in your blood pressure.  Breathe in slowly through your nose, and breathe out slowly through your mouth.  Summary  Strengthening your lower body is an important step to help you move safely and independently.  The sit-to-stand exercise helps strengthen the muscles in your thighs and core.  You should always talk with your health care provider before starting any exercise program, especially if you have had recent surgery.  This information is not intended to replace advice given to you by your health care provider. Make sure you discuss any questions you have with your health care provider.  Document Revised: 04/10/2022 Document Reviewed: 04/10/2022  Martha  Patient Education 2023 [employer]    You are due for Shingrix vaccination series ( the newest shingles vaccine).  It is a two shot series spaced 2-6 months apart. Please get this vaccine series started at your earliest convenience at your local pharmacy to help avoid shingles outbreak. It is more effective than the old Zostavax vaccine and is recommended even if you have had the Zostavax vaccine in the past.  Once the Shingrix series is completed, it does not need to be repeated.   For more information, please look at the website below:  [facility] Shingrix Vaccine Information      Medicare Wellness  Personal Prevention Plan of Service     Date of Office Visit:    Encounter Provider:  EDD Vargas  Place of Service:  [facility]  Patient Name: June Melchor  :  1955    As part of the Medicare Wellness portion of your visit today, we are providing you with this personalized preventive plan of services (PPPS). This plan is based upon recommendations of the [place] Preventive Services Task Force (USPSTF) and the Advisory Committee on Immunization Practices (ACIP).    This lists the preventive care services that should be considered, and provides dates of when you are due. Items listed as completed are up-to-date and do not require any further intervention.    Health Maintenance   Topic Date Due   • RSV Vaccine - Adults >60 Years or Pregnant 32-36 Weeks (1 - 1-dose 60+ series) Never done   • ZOSTER VACCINE (2 of 2) 2016   • COLORECTAL CANCER SCREENING  2017   • COVID-19 Vaccine ( season) 2023   • ANNUAL WELLNESS VISIT  2023   • DXA SCAN  2024   • MAMMOGRAM  2024   • BMI FOLLOWUP  2024   • LIPID PANEL  2025   • TDAP/TD VACCINES (3 - Td or Tdap) 2033   • HEPATITIS C SCREENING  Completed   • INFLUENZA VACCINE  Completed   • Pneumococcal Vaccine 65+  Completed   • PAP SMEAR  Discontinued       No orders  of the defined types were placed in this encounter.      No follow-ups on file.

## 2024-03-12 ENCOUNTER — TELEPHONE (OUTPATIENT)
Dept: FAMILY MEDICINE CLINIC | Facility: CLINIC | Age: 69
End: 2024-03-12
Payer: MEDICARE

## 2024-03-12 DIAGNOSIS — Z12.11 COLON CANCER SCREENING: Primary | ICD-10-CM

## 2024-03-12 NOTE — TELEPHONE ENCOUNTER
----- Message from EDD Vargas sent at 3/12/2024  9:53 AM EDT -----  Done.  She should be receiving a Cologuard box in the mail.  ----- Message -----  From: Sandra Gomez MA  Sent: 3/11/2024   8:55 AM EDT  To: EDD Vargas    Pt would like to have the cologuard done. Could you put the order in for this please?

## 2024-03-28 ENCOUNTER — OFFICE VISIT (OUTPATIENT)
Dept: FAMILY MEDICINE CLINIC | Facility: CLINIC | Age: 69
End: 2024-03-28
Payer: MEDICARE

## 2024-03-28 VITALS
BODY MASS INDEX: 27.16 KG/M2 | WEIGHT: 163 LBS | SYSTOLIC BLOOD PRESSURE: 110 MMHG | HEIGHT: 65 IN | TEMPERATURE: 97.6 F | DIASTOLIC BLOOD PRESSURE: 70 MMHG | OXYGEN SATURATION: 98 % | HEART RATE: 57 BPM

## 2024-03-28 DIAGNOSIS — I10 BENIGN ESSENTIAL HYPERTENSION: Chronic | ICD-10-CM

## 2024-03-28 DIAGNOSIS — B37.2 CANDIDIASIS, INTERTRIGINOUS: Primary | ICD-10-CM

## 2024-03-28 DIAGNOSIS — E78.2 MIXED HYPERLIPIDEMIA: Chronic | ICD-10-CM

## 2024-03-28 PROCEDURE — 3078F DIAST BP <80 MM HG: CPT | Performed by: PHYSICIAN ASSISTANT

## 2024-03-28 PROCEDURE — 99214 OFFICE O/P EST MOD 30 MIN: CPT | Performed by: PHYSICIAN ASSISTANT

## 2024-03-28 PROCEDURE — 1159F MED LIST DOCD IN RCRD: CPT | Performed by: PHYSICIAN ASSISTANT

## 2024-03-28 PROCEDURE — 1160F RVW MEDS BY RX/DR IN RCRD: CPT | Performed by: PHYSICIAN ASSISTANT

## 2024-03-28 PROCEDURE — 3074F SYST BP LT 130 MM HG: CPT | Performed by: PHYSICIAN ASSISTANT

## 2024-03-28 RX ORDER — NYSTATIN 100000 U/G
1 CREAM TOPICAL 2 TIMES DAILY
Qty: 30 G | Refills: 1 | Status: SHIPPED | OUTPATIENT
Start: 2024-03-28

## 2024-04-09 ENCOUNTER — TRANSCRIBE ORDERS (OUTPATIENT)
Dept: ADMINISTRATIVE | Facility: HOSPITAL | Age: 69
End: 2024-04-09
Payer: MEDICARE

## 2024-04-09 DIAGNOSIS — Z12.31 VISIT FOR SCREENING MAMMOGRAM: Primary | ICD-10-CM

## 2024-05-06 ENCOUNTER — OFFICE VISIT (OUTPATIENT)
Dept: FAMILY MEDICINE CLINIC | Facility: CLINIC | Age: 69
End: 2024-05-06
Payer: MEDICARE

## 2024-05-06 VITALS
SYSTOLIC BLOOD PRESSURE: 112 MMHG | WEIGHT: 164 LBS | HEART RATE: 63 BPM | TEMPERATURE: 96.9 F | DIASTOLIC BLOOD PRESSURE: 64 MMHG | HEIGHT: 65 IN | BODY MASS INDEX: 27.32 KG/M2 | OXYGEN SATURATION: 97 %

## 2024-05-06 DIAGNOSIS — E03.9 ACQUIRED HYPOTHYROIDISM: Chronic | ICD-10-CM

## 2024-05-06 DIAGNOSIS — I10 BENIGN ESSENTIAL HYPERTENSION: Primary | Chronic | ICD-10-CM

## 2024-05-06 DIAGNOSIS — E78.2 MIXED HYPERLIPIDEMIA: Chronic | ICD-10-CM

## 2024-05-06 PROCEDURE — 99214 OFFICE O/P EST MOD 30 MIN: CPT | Performed by: PHYSICIAN ASSISTANT

## 2024-05-06 PROCEDURE — 3074F SYST BP LT 130 MM HG: CPT | Performed by: PHYSICIAN ASSISTANT

## 2024-05-06 PROCEDURE — 3078F DIAST BP <80 MM HG: CPT | Performed by: PHYSICIAN ASSISTANT

## 2024-05-06 PROCEDURE — 1159F MED LIST DOCD IN RCRD: CPT | Performed by: PHYSICIAN ASSISTANT

## 2024-05-06 PROCEDURE — 1160F RVW MEDS BY RX/DR IN RCRD: CPT | Performed by: PHYSICIAN ASSISTANT

## 2024-08-09 ENCOUNTER — HOSPITAL ENCOUNTER (OUTPATIENT)
Dept: MAMMOGRAPHY | Facility: HOSPITAL | Age: 69
Discharge: HOME | End: 2024-08-09
Payer: MEDICARE

## 2024-08-09 ENCOUNTER — HOSPITAL ENCOUNTER (OUTPATIENT)
Dept: BONE DENSITY | Facility: HOSPITAL | Age: 69
Discharge: HOME | End: 2024-08-09
Payer: MEDICARE

## 2024-08-09 DIAGNOSIS — Z91.89 OTHER SPECIFIED PERSONAL RISK FACTORS, NOT ELSEWHERE CLASSIFIED: ICD-10-CM

## 2024-08-09 DIAGNOSIS — Z78.0 ENCOUNTER FOR OSTEOPOROSIS SCREENING IN ASYMPTOMATIC POSTMENOPAUSAL PATIENT: ICD-10-CM

## 2024-08-09 DIAGNOSIS — Z13.820 ENCOUNTER FOR OSTEOPOROSIS SCREENING IN ASYMPTOMATIC POSTMENOPAUSAL PATIENT: ICD-10-CM

## 2024-08-09 DIAGNOSIS — Z12.31 VISIT FOR SCREENING MAMMOGRAM: ICD-10-CM

## 2024-08-09 PROCEDURE — 77063 BREAST TOMOSYNTHESIS BI: CPT

## 2024-08-09 PROCEDURE — 77067 SCR MAMMO BI INCL CAD: CPT

## 2024-08-09 PROCEDURE — 77080 DXA BONE DENSITY AXIAL: CPT

## 2024-09-11 NOTE — PROGRESS NOTES
"Chief Complaint -rash    History of Present Illness -     June Melchor is a 68 y.o. female.     Rash-  Patient complains of a rash under her left breast that was erythematous.  Some relief with use of Vaseline and the coloration has changed to more tan.  There is associated moisture in the area.  Onset 1 month    Hyperlipidemia-  Stable with fenofibrate and rosuvastatin    Hypertension-  Controlled with losartan      The following portions of the patient's history were reviewed and updated as appropriate: allergies, current medications, past family history, past medical history, past social history, past surgical history and problem list.        Objective  Vital signs:  /70   Pulse 57   Temp 97.6 °F (36.4 °C) (Temporal)   Ht 165.1 cm (65\")   Wt 73.9 kg (163 lb)   SpO2 98%   BMI 27.12 kg/m²     Physical Exam  Vitals and nursing note reviewed.   Constitutional:       Appearance: Normal appearance. She is well-developed.   Eyes:      Extraocular Movements: Extraocular movements intact.      Conjunctiva/sclera: Conjunctivae normal.   Cardiovascular:      Rate and Rhythm: Normal rate and regular rhythm.      Heart sounds: Normal heart sounds. No murmur heard.  Pulmonary:      Effort: Pulmonary effort is normal. No respiratory distress.      Breath sounds: Normal breath sounds. No wheezing.   Musculoskeletal:         General: No tenderness.   Skin:     General: Skin is warm and dry.      Findings: Rash (Slightly erythematous skin noted under left breast) present.   Neurological:      Mental Status: She is alert and oriented to person, place, and time.   Psychiatric:         Mood and Affect: Mood normal.         Behavior: Behavior normal.         Thought Content: Thought content normal.         The following data was reviewed by Paulette Wallis PA-C:         Lab Results   Component Value Date    BUN 27 (H) 01/30/2024    CREATININE 0.90 01/30/2024    EGFR 68.9 01/30/2024    ALT 13 01/30/2024    AST 22 01/30/2024 " Relay     Our office is closing as of 10/18/24. Patient has an appointment scheduled on 5/21/25. We need to cancel this appointment. If you need anything on or before 10/18/24 please give us a call. If you are looking for a primary care provider with in [place] our [facility] and [place] locations are accepting new patients.                      WBC 5.66 06/22/2018    HGB 14.6 06/22/2018    HCT 41.4 06/22/2018     06/22/2018    CHOL 158 01/30/2024    TRIG 100 01/30/2024    HDL 59 01/30/2024    LDL 81 01/30/2024    TSH 0.689 01/30/2024    HGBA1C 5.00 01/30/2024           Assessment & Plan     Diagnoses and all orders for this visit:    1. Candidiasis, intertriginous (Primary)  -     nystatin (MYCOSTATIN) 488559 UNIT/GM cream; Apply 1 Application topically to the appropriate area as directed 2 (Two) Times a Day.  Dispense: 30 g; Refill: 1    2. Benign essential hypertension  Comments:  Continue losartan  Advised daily BP and pulse monitoring    3. Mixed hyperlipidemia  Comments:  Advised a low-cholesterol diet  Continue fenofibrate and rosuvastatin                   Patient was given instructions and counseling regarding his condition or for health maintenance advice. Please see specific information pulled into the AVS if appropriate      This document has been electronically signed by:  Paulette Wallis PA-C

## 2024-11-07 ENCOUNTER — OFFICE VISIT (OUTPATIENT)
Dept: FAMILY MEDICINE CLINIC | Facility: CLINIC | Age: 69
End: 2024-11-07
Payer: MEDICARE

## 2024-11-07 VITALS
SYSTOLIC BLOOD PRESSURE: 110 MMHG | TEMPERATURE: 97.7 F | HEART RATE: 89 BPM | OXYGEN SATURATION: 100 % | HEIGHT: 65 IN | BODY MASS INDEX: 25.16 KG/M2 | DIASTOLIC BLOOD PRESSURE: 70 MMHG | WEIGHT: 151 LBS

## 2024-11-07 DIAGNOSIS — K21.9 GASTROESOPHAGEAL REFLUX DISEASE WITHOUT ESOPHAGITIS: Chronic | ICD-10-CM

## 2024-11-07 DIAGNOSIS — E78.2 MIXED HYPERLIPIDEMIA: ICD-10-CM

## 2024-11-07 DIAGNOSIS — F51.04 PSYCHOPHYSIOLOGICAL INSOMNIA: Primary | ICD-10-CM

## 2024-11-07 DIAGNOSIS — R73.03 PREDIABETES: Chronic | ICD-10-CM

## 2024-11-07 DIAGNOSIS — E03.4 HYPOTHYROIDISM DUE TO ACQUIRED ATROPHY OF THYROID: Chronic | ICD-10-CM

## 2024-11-07 DIAGNOSIS — F41.1 GENERALIZED ANXIETY DISORDER: Chronic | ICD-10-CM

## 2024-11-07 DIAGNOSIS — I10 BENIGN ESSENTIAL HYPERTENSION: ICD-10-CM

## 2024-11-07 PROCEDURE — 3078F DIAST BP <80 MM HG: CPT | Performed by: PHYSICIAN ASSISTANT

## 2024-11-07 PROCEDURE — 3074F SYST BP LT 130 MM HG: CPT | Performed by: PHYSICIAN ASSISTANT

## 2024-11-07 PROCEDURE — 1160F RVW MEDS BY RX/DR IN RCRD: CPT | Performed by: PHYSICIAN ASSISTANT

## 2024-11-07 PROCEDURE — 1126F AMNT PAIN NOTED NONE PRSNT: CPT | Performed by: PHYSICIAN ASSISTANT

## 2024-11-07 PROCEDURE — 1159F MED LIST DOCD IN RCRD: CPT | Performed by: PHYSICIAN ASSISTANT

## 2024-11-07 PROCEDURE — 99214 OFFICE O/P EST MOD 30 MIN: CPT | Performed by: PHYSICIAN ASSISTANT

## 2024-11-07 RX ORDER — LEVOTHYROXINE SODIUM 125 UG/1
125 TABLET ORAL DAILY
Qty: 90 TABLET | Refills: 3 | Status: SHIPPED | OUTPATIENT
Start: 2024-11-07

## 2024-11-07 RX ORDER — ESCITALOPRAM OXALATE 10 MG/1
10 TABLET ORAL DAILY
Qty: 90 TABLET | Refills: 3 | Status: SHIPPED | OUTPATIENT
Start: 2024-11-07

## 2024-11-07 RX ORDER — FENOFIBRATE 145 MG/1
145 TABLET, COATED ORAL DAILY
Qty: 90 TABLET | Refills: 3 | Status: SHIPPED | OUTPATIENT
Start: 2024-11-07

## 2024-11-07 RX ORDER — LOSARTAN POTASSIUM 100 MG/1
100 TABLET ORAL DAILY
Qty: 90 TABLET | Refills: 3 | Status: SHIPPED | OUTPATIENT
Start: 2024-11-07

## 2024-11-07 RX ORDER — FAMOTIDINE 40 MG/1
40 TABLET, FILM COATED ORAL DAILY
Qty: 90 TABLET | Refills: 3 | Status: SHIPPED | OUTPATIENT
Start: 2024-11-07

## 2024-11-07 RX ORDER — AMITRIPTYLINE HYDROCHLORIDE 100 MG/1
100 TABLET ORAL NIGHTLY
Qty: 90 TABLET | Refills: 3 | Status: SHIPPED | OUTPATIENT
Start: 2024-11-07

## 2024-11-07 RX ORDER — ROSUVASTATIN CALCIUM 20 MG/1
20 TABLET, COATED ORAL NIGHTLY
Qty: 90 TABLET | Refills: 3 | Status: SHIPPED | OUTPATIENT
Start: 2024-11-07

## 2024-11-07 RX ORDER — HYDROXYZINE HYDROCHLORIDE 50 MG/1
50 TABLET, FILM COATED ORAL EVERY 8 HOURS PRN
Qty: 180 TABLET | Refills: 3 | Status: SHIPPED | OUTPATIENT
Start: 2024-11-07

## 2024-11-07 NOTE — PROGRESS NOTES
"Chief Complaint -insomnia    History of Present Illness -     June Melchor is a 69 y.o. female.     Insomnia-  Stable with amitriptyline    Generalized anxiety disorder-  Stable with citalopram.  She denies any hallucinations, SI or HI    Gastroesophageal reflux disease-  Stable with Pepcid and dietary modification    Hyperlipidemia-  Stable with fenofibrate and low-cholesterol diet    Hypothyroidism-  Stable with Synthroid 125 mcg daily    Hypertension-  Controlled with losartan    Prediabetes-  Stable with dietary modification    The following portions of the patient's history were reviewed and updated as appropriate: allergies, current medications, past family history, past medical history, past social history, past surgical history and problem list.        Objective  Vital signs:  /70   Pulse 89   Temp 97.7 °F (36.5 °C) (Temporal)   Ht 165.1 cm (65\")   Wt 68.5 kg (151 lb)   SpO2 100%   BMI 25.13 kg/m²     Physical Exam  Vitals and nursing note reviewed.   Constitutional:       Appearance: Normal appearance. She is well-developed.   Eyes:      Extraocular Movements: Extraocular movements intact.      Conjunctiva/sclera: Conjunctivae normal.   Cardiovascular:      Rate and Rhythm: Normal rate and regular rhythm.      Heart sounds: Normal heart sounds. No murmur heard.  Pulmonary:      Effort: Pulmonary effort is normal. No respiratory distress.      Breath sounds: Normal breath sounds. No wheezing.   Musculoskeletal:         General: No tenderness.   Skin:     General: Skin is warm and dry.      Findings: No rash.   Neurological:      Mental Status: She is alert and oriented to person, place, and time.   Psychiatric:         Mood and Affect: Mood normal.         Behavior: Behavior normal.         Thought Content: Thought content normal.         The following data was reviewed by Paulette Wallis PA-C:         Lab Results   Component Value Date    BUN 27 (H) 01/30/2024    CREATININE 0.90 01/30/2024    " EGFR 68.9 01/30/2024    ALT 13 01/30/2024    AST 22 01/30/2024    WBC 5.66 06/22/2018    HGB 14.6 06/22/2018    HCT 41.4 06/22/2018     06/22/2018    CHOL 158 01/30/2024    TRIG 100 01/30/2024    HDL 59 01/30/2024    LDL 81 01/30/2024    TSH 0.689 01/30/2024    HGBA1C 5.00 01/30/2024           Assessment & Plan     Diagnoses and all orders for this visit:    1. Psychophysiological insomnia (Primary)  Comments:  Continue amitriptyline 100 mg nightly  Orders:  -     amitriptyline (ELAVIL) 100 MG tablet; Take 1 tablet by mouth Every Night.  Dispense: 90 tablet; Refill: 3  -     hydrOXYzine (ATARAX) 50 MG tablet; Take 1 tablet by mouth Every 8 (Eight) Hours As Needed for Anxiety.  Dispense: 180 tablet; Refill: 3  -     TSH; Future    2. Generalized anxiety disorder  Comments:  Continue escitalopram 10 mg daily  Orders:  -     escitalopram (Lexapro) 10 MG tablet; Take 1 tablet by mouth Daily.  Dispense: 90 tablet; Refill: 3  -     TSH; Future    3. Gastroesophageal reflux disease without esophagitis  Comments:  Discontinue omeprazole   Continue Pepcid  Advised to avoid known trigger foods  Orders:  -     famotidine (Pepcid) 40 MG tablet; Take 1 tablet by mouth Daily.  Dispense: 90 tablet; Refill: 3    4. Mixed hyperlipidemia  Comments:  Advised low-cholesterol diet  Continue fenofibrate  Orders:  -     fenofibrate (TRICOR) 145 MG tablet; Take 1 tablet by mouth Daily.  Dispense: 90 tablet; Refill: 3  -     rosuvastatin (CRESTOR) 20 MG tablet; Take 1 tablet by mouth Every Night.  Dispense: 90 tablet; Refill: 3  -     Lipid Panel; Future  -     Comprehensive Metabolic Panel; Future  -     Hemoglobin A1c; Future    5. Hypothyroidism due to acquired atrophy of thyroid  Comments:  Continue Synthroid 125 mcg dose  Orders:  -     levothyroxine (Synthroid) 125 MCG tablet; Take 1 tablet by mouth Daily.  Dispense: 90 tablet; Refill: 3  -     TSH; Future  -     T4, Free; Future    6. Benign essential  hypertension  Comments:  Continue to monitor daily blood pressure and pulse   Continue losartan  Orders:  -     losartan (COZAAR) 100 MG tablet; Take 1 tablet by mouth Daily.  Dispense: 90 tablet; Refill: 3  -     Comprehensive Metabolic Panel; Future  -     Hemoglobin A1c; Future    7. Prediabetes  Comments:  Advise low carbohydrate diabetic diet  Orders:  -     Comprehensive Metabolic Panel; Future  -     Hemoglobin A1c; Future        BMI is >= 25 and <30. (Overweight) The following options were offered after discussion;: exercise counseling/recommendations and nutrition counseling/recommendations          Patient was given instructions and counseling regarding his condition or for health maintenance advice. Please see specific information pulled into the AVS if appropriate      This document has been electronically signed by:  Paulette Wallis PA-C

## 2024-11-07 NOTE — PATIENT INSTRUCTIONS
Carbohydrate Counting for Diabetes Mellitus, Adult  Carbohydrate counting is a method of keeping track of how many carbohydrates you eat. Eating carbohydrates increases the amount of sugar (glucose) in the blood. Counting how many carbohydrates you eat improves how well you manage your blood glucose. This, in turn, helps you manage your diabetes.  Carbohydrates are measured in grams (g) per serving. It is important to know how many carbohydrates (in grams or by serving size) you can have in each meal. This is different for every person. A dietitian can help you make a meal plan and calculate how many carbohydrates you should have at each meal and snack.  What foods contain carbohydrates?  Carbohydrates are found in the following foods:  Grains, such as breads and cereals.  Dried beans and soy products.  Starchy vegetables, such as potatoes, peas, and corn.  Fruit and fruit juices.  Milk and yogurt.  Sweets and snack foods, such as cake, cookies, candy, chips, and soft drinks.  How do I count carbohydrates in foods?  There are two ways to count carbohydrates in food. You can read food labels or learn standard serving sizes of foods. You can use either of these methods or a combination of both.  Using the Nutrition Facts label  The Nutrition Facts list is included on the labels of almost all packaged foods and beverages in the [place]. It includes:  The serving size.  Information about nutrients in each serving, including the grams of carbohydrate per serving.  To use the Nutrition Facts, decide how many servings you will have. Then, multiply the number of servings by the number of carbohydrates per serving. The resulting number is the total grams of carbohydrates that you will be having.  Learning the standard serving sizes of foods  When you eat carbohydrate foods that are not packaged or do not include Nutrition Facts on the label, you need to measure the servings in order to count the grams of  carbohydrates.  Measure the foods that you will eat with a food scale or measuring cup, if needed.  Decide how many standard-size servings you will eat.  Multiply the number of servings by 15. For foods that contain carbohydrates, one serving equals 15 g of carbohydrates.  For example, if you eat 2 cups or 10 oz (300 g) of strawberries, you will have eaten 2 servings and 30 g of carbohydrates (2 servings x 15 g = 30 g).  For foods that have more than one food mixed, such as soups and casseroles, you must count the carbohydrates in each food that is included.  The following list contains standard serving sizes of common carbohydrate-rich foods. Each of these servings has about 15 g of carbohydrates:  1 slice of bread.  1 six-inch (15 cm) tortilla.  ? cup or 2 oz (53 g) cooked rice or pasta.  ½ cup or 3 oz (85 g) cooked or canned, drained and rinsed beans or lentils.  ½ cup or 3 oz (85 g) starchy vegetable, such as peas, corn, or squash.  ½ cup or 4 oz (120 g) hot cereal.  ½ cup or 3 oz (85 g) boiled or mashed potatoes, or ¼ or 3 oz (85 g) of a large baked potato.  ½ cup or 4 fl oz (118 mL) fruit juice.  1 cup or 8 fl oz (237 mL) milk.  1 small or 4 oz (106 g) apple.  ½ or 2 oz (63 g) of a medium banana.  1 cup or 5 oz (150 g) strawberries.  3 cups or 1 oz (28.3 g) popped popcorn.  What is an example of carbohydrate counting?  To calculate the grams of carbohydrates in this sample meal, follow the steps shown below.  Sample meal  3 oz (85 g) chicken breast.  ? cup or 4 oz (106 g) brown rice.  ½ cup or 3 oz (85 g) corn.  1 cup or 8 fl oz (237 mL) milk.  1 cup or 5 oz (150 g) strawberries with sugar-free whipped topping.  Carbohydrate calculation  Identify the foods that contain carbohydrates:  Rice.  Corn.  Milk.  Strawberries.  Calculate how many servings you have of each food:  2 servings rice.  1 serving corn.  1 serving milk.  1 serving strawberries.  Multiply each number of servings by 15  servings rice x 15  g = 30 g.  1 serving corn x 15 g = 15 g.  1 serving milk x 15 g = 15 g.  1 serving strawberries x 15 g = 15 g.  Add together all of the amounts to find the total grams of carbohydrates eaten:  30 g + 15 g + 15 g + 15 g = 75 g of carbohydrates total.  What are tips for following this plan?  Shopping  Develop a meal plan and then make a shopping list.  Buy fresh and frozen vegetables, fresh and frozen fruit, dairy, eggs, beans, lentils, and whole grains.  Look at food labels. Choose foods that have more fiber and less sugar.  Avoid processed foods and foods with added sugars.  Meal planning  Aim to have the same number of grams of carbohydrates at each meal and for each snack time.  Plan to have regular, balanced meals and snacks.  Where to find more information  American Diabetes Association: diabetes.org  Centers for Disease Control and Prevention: cdc.gov  Academy of Nutrition and Dietetics: eatright.org  Association of Diabetes Care & Education Specialists: diabeteseducator.org  Summary  Carbohydrate counting is a method of keeping track of how many carbohydrates you eat.  Eating carbohydrates increases the amount of sugar (glucose) in your blood.  Counting how many carbohydrates you eat improves how well you manage your blood glucose. This helps you manage your diabetes.  A dietitian can help you make a meal plan and calculate how many carbohydrates you should have at each meal and snack.  This information is not intended to replace advice given to you by your health care provider. Make sure you discuss any questions you have with your health care provider.  Document Revised: 07/21/2021 Document Reviewed: 07/21/2021  Elsevier Patient Education © 2024 [employer]  Fat and Cholesterol Restricted Eating Plan  Getting too much fat and cholesterol in your diet may cause health problems. Choosing the right foods helps keep your fat and cholesterol at normal levels. This can keep you from getting certain  "diseases.  Your doctor may recommend an eating plan that includes:  Total fat: ______% or less of total calories a day. This is ______g of fat a day.  Saturated fat: ______% or less of total calories a day. This is ______g of saturated fat a day.  Cholesterol: less than _________mg a day.  Fiber: ______g a day.  What are tips for following this plan?  General tips  Work with your doctor to lose weight if you need to.  Avoid:  Foods with added sugar.  Fried foods.  Foods with trans fat or partially hydrogenated oils. This includes some margarines and baked goods.  If you drink alcohol:  Limit how much you have to:  0-1 drink a day for women who are not pregnant.  0-2 drinks a day for men.  Know how much alcohol is in a drink. In the [place] one drink equals one 12 oz bottle of beer (355 mL), one 5 oz glass of wine (148 mL), or one 1½ oz glass of hard liquor (44 mL).  Reading food labels  Check food labels for:  Trans fats.  Partially hydrogenated oils.  Saturated fat (g) in each serving.  Cholesterol (mg) in each serving.  Fiber (g) in each serving.  Choose foods with healthy fats, such as:  Monounsaturated fats and polyunsaturated fats. These include olive and canola oil, flaxseeds, walnuts, almonds, and seeds.  Omega-3 fats. These are found in certain fish, flaxseed oil, and ground flaxseeds.  Choose grain products that have whole grains. Look for the word \"whole\" as the first word in the ingredient list.  Cooking  Cook foods using low-fat methods. These include baking, boiling, grilling, and broiling.  Eat more home-cooked foods. Eat at restaurants and buffets less often. Eat less fast food.  Avoid cooking using saturated fats, such as butter, cream, palm oil, palm kernel oil, and coconut oil.  Meal planning    At meals, divide your plate into four equal parts:  Fill one-half of your plate with vegetables, green salads, and fruit.  Fill one-fourth of your plate with whole grains.  Fill one-fourth of your plate with " low-fat (lean) protein foods.  Eat fish that is high in omega-3 fats at least two times a week. This includes mackerel, tuna, sardines, and salmon.  Eat foods that are high in fiber, such as whole grains, beans, apples, pears, berries, broccoli, carrots, peas, and barley.  What foods should I eat?  Fruits  All fresh, canned (in natural juice), or frozen fruits.  Vegetables  Fresh or frozen vegetables (raw, steamed, roasted, or grilled). Green salads.  Grains  Whole grains, such as whole wheat or whole grain breads, crackers, cereals, and pasta. Unsweetened oatmeal, bulgur, barley, quinoa, or brown rice. Corn or whole wheat flour tortillas.  Meats and other protein foods  Ground beef (85% or leaner), grass-fed beef, or beef trimmed of fat. Skinless chicken or turkey. Ground chicken or turkey. Pork trimmed of fat. All fish and seafood. Egg whites. Dried beans, peas, or lentils. Unsalted nuts or seeds. Unsalted canned beans. Nut butters without added sugar or oil.  Dairy  Low-fat or nonfat dairy products, such as skim or 1% milk, 2% or reduced-fat cheeses, low-fat and fat-free ricotta or cottage cheese, or plain low-fat and nonfat yogurt.  Fats and oils  Tub margarine without trans fats. Light or reduced-fat mayonnaise and salad dressings. Avocado. Olive, canola, sesame, or safflower oils.  The items listed above may not be a complete list of foods and beverages you can eat. Contact a dietitian for more information.  What foods should I avoid?  Fruits  Canned fruit in heavy syrup. Fruit in cream or butter sauce. Fried fruit.  Vegetables  Vegetables cooked in cheese, cream, or butter sauce. Fried vegetables.  Grains  White bread. White pasta. White rice. Cornbread. Bagels, pastries, and croissants. Crackers and snack foods that contain trans fat and hydrogenated oils.  Meats and other protein foods  Fatty cuts of meat. Ribs, chicken wings, morfin, sausage, bologna, salami, chitterlings, fatback, hot dogs, bratwurst, and  packaged lunch meats. Liver and organ meats. Whole eggs and egg yolks. Chicken and turkey with skin. Fried meat.  Dairy  Whole or 2% milk, cream, half-and-half, and cream cheese. Whole milk cheeses. Whole-fat or sweetened yogurt. Full-fat cheeses. Nondairy creamers and whipped toppings. Processed cheese, cheese spreads, and cheese curds.  Fats and oils  Butter, stick margarine, lard, shortening, ghee, or morfin fat. Coconut, palm kernel, and palm oils.  Beverages  Alcohol. Sugar-sweetened drinks such as sodas, lemonade, and fruit drinks.  Sweets and desserts  Corn syrup, sugars, honey, and molasses. Candy. Jam and jelly. Syrup. Sweetened cereals. Cookies, pies, cakes, donuts, muffins, and ice cream.  The items listed above may not be a complete list of foods and beverages you should avoid. Contact a dietitian for more information.  Summary  Choosing the right foods helps keep your fat and cholesterol at normal levels. This can keep you from getting certain diseases.  At meals, fill one-half of your plate with vegetables, green salads, and fruits.  Eat high fiber foods, like whole grains, beans, apples, pears, berries, carrots, peas, and barley.  Limit added sugar, saturated fats, alcohol, and fried foods.  This information is not intended to replace advice given to you by your health care provider. Make sure you discuss any questions you have with your health care provider.  Document Revised: 04/29/2022 Document Reviewed: 04/29/2022  Elsevier Patient Education © 2024 [employer]

## 2025-02-25 ENCOUNTER — OFFICE VISIT (OUTPATIENT)
Dept: FAMILY MEDICINE CLINIC | Facility: CLINIC | Age: 70
End: 2025-02-25
Payer: MEDICARE

## 2025-02-25 VITALS
SYSTOLIC BLOOD PRESSURE: 128 MMHG | OXYGEN SATURATION: 96 % | TEMPERATURE: 97.8 F | DIASTOLIC BLOOD PRESSURE: 70 MMHG | HEART RATE: 76 BPM | BODY MASS INDEX: 24.83 KG/M2 | HEIGHT: 65 IN | WEIGHT: 149 LBS

## 2025-02-25 DIAGNOSIS — I10 BENIGN ESSENTIAL HYPERTENSION: Chronic | ICD-10-CM

## 2025-02-25 DIAGNOSIS — J40 BRONCHITIS: Primary | ICD-10-CM

## 2025-02-25 DIAGNOSIS — E78.2 MIXED HYPERLIPIDEMIA: Chronic | ICD-10-CM

## 2025-02-25 PROBLEM — J30.9 ALLERGIC RHINITIS: Status: ACTIVE | Noted: 2025-02-25

## 2025-02-25 PROBLEM — R59.0 CERVICAL ADENOPATHY: Status: ACTIVE | Noted: 2025-02-25

## 2025-02-25 PROCEDURE — 99214 OFFICE O/P EST MOD 30 MIN: CPT | Performed by: PHYSICIAN ASSISTANT

## 2025-02-25 PROCEDURE — 1160F RVW MEDS BY RX/DR IN RCRD: CPT | Performed by: PHYSICIAN ASSISTANT

## 2025-02-25 PROCEDURE — 1126F AMNT PAIN NOTED NONE PRSNT: CPT | Performed by: PHYSICIAN ASSISTANT

## 2025-02-25 PROCEDURE — 3074F SYST BP LT 130 MM HG: CPT | Performed by: PHYSICIAN ASSISTANT

## 2025-02-25 PROCEDURE — 3078F DIAST BP <80 MM HG: CPT | Performed by: PHYSICIAN ASSISTANT

## 2025-02-25 PROCEDURE — 1159F MED LIST DOCD IN RCRD: CPT | Performed by: PHYSICIAN ASSISTANT

## 2025-02-25 RX ORDER — DOXYCYCLINE 100 MG/1
100 CAPSULE ORAL 2 TIMES DAILY
Qty: 20 CAPSULE | Refills: 0 | Status: SHIPPED | OUTPATIENT
Start: 2025-02-25 | End: 2025-03-07

## 2025-02-25 RX ORDER — ALBUTEROL SULFATE 90 UG/1
2 INHALANT RESPIRATORY (INHALATION) EVERY 4 HOURS PRN
Qty: 18 G | Refills: 0 | Status: SHIPPED | OUTPATIENT
Start: 2025-02-25

## 2025-02-25 NOTE — PATIENT INSTRUCTIONS
"Fat and Cholesterol Restricted Eating Plan  Getting too much fat and cholesterol in your diet may cause health problems. Choosing the right foods helps keep your fat and cholesterol at normal levels. This can keep you from getting certain diseases.  Your doctor may recommend an eating plan that includes:  Total fat: ______% or less of total calories a day. This is ______g of fat a day.  Saturated fat: ______% or less of total calories a day. This is ______g of saturated fat a day.  Cholesterol: less than _________mg a day.  Fiber: ______g a day.  What are tips for following this plan?  General tips  Work with your doctor to lose weight if you need to.  Avoid:  Foods with added sugar.  Fried foods.  Foods with trans fat or partially hydrogenated oils. This includes some margarines and baked goods.  If you drink alcohol:  Limit how much you have to:  0-1 drink a day for women who are not pregnant.  0-2 drinks a day for men.  Know how much alcohol is in a drink. In the [place] one drink equals one 12 oz bottle of beer (355 mL), one 5 oz glass of wine (148 mL), or one 1½ oz glass of hard liquor (44 mL).  Reading food labels  Check food labels for:  Trans fats.  Partially hydrogenated oils.  Saturated fat (g) in each serving.  Cholesterol (mg) in each serving.  Fiber (g) in each serving.  Choose foods with healthy fats, such as:  Monounsaturated fats and polyunsaturated fats. These include olive and canola oil, flaxseeds, walnuts, almonds, and seeds.  Omega-3 fats. These are found in certain fish, flaxseed oil, and ground flaxseeds.  Choose grain products that have whole grains. Look for the word \"whole\" as the first word in the ingredient list.  Cooking  Cook foods using low-fat methods. These include baking, boiling, grilling, and broiling.  Eat more home-cooked foods. Eat at restaurants and buffets less often. Eat less fast food.  Avoid cooking using saturated fats, such as butter, cream, palm oil, palm kernel oil, and " coconut oil.  Meal planning    At meals, divide your plate into four equal parts:  Fill one-half of your plate with vegetables, green salads, and fruit.  Fill one-fourth of your plate with whole grains.  Fill one-fourth of your plate with low-fat (lean) protein foods.  Eat fish that is high in omega-3 fats at least two times a week. This includes mackerel, tuna, sardines, and salmon.  Eat foods that are high in fiber, such as whole grains, beans, apples, pears, berries, broccoli, carrots, peas, and barley.  What foods should I eat?  Fruits  All fresh, canned (in natural juice), or frozen fruits.  Vegetables  Fresh or frozen vegetables (raw, steamed, roasted, or grilled). Green salads.  Grains  Whole grains, such as whole wheat or whole grain breads, crackers, cereals, and pasta. Unsweetened oatmeal, bulgur, barley, quinoa, or brown rice. Corn or whole wheat flour tortillas.  Meats and other protein foods  Ground beef (85% or leaner), grass-fed beef, or beef trimmed of fat. Skinless chicken or turkey. Ground chicken or turkey. Pork trimmed of fat. All fish and seafood. Egg whites. Dried beans, peas, or lentils. Unsalted nuts or seeds. Unsalted canned beans. Nut butters without added sugar or oil.  Dairy  Low-fat or nonfat dairy products, such as skim or 1% milk, 2% or reduced-fat cheeses, low-fat and fat-free ricotta or cottage cheese, or plain low-fat and nonfat yogurt.  Fats and oils  Tub margarine without trans fats. Light or reduced-fat mayonnaise and salad dressings. Avocado. Olive, canola, sesame, or safflower oils.  The items listed above may not be a complete list of foods and beverages you can eat. Contact a dietitian for more information.  What foods should I avoid?  Fruits  Canned fruit in heavy syrup. Fruit in cream or butter sauce. Fried fruit.  Vegetables  Vegetables cooked in cheese, cream, or butter sauce. Fried vegetables.  Grains  White bread. White pasta. White rice. Cornbread. Bagels, pastries,  and croissants. Crackers and snack foods that contain trans fat and hydrogenated oils.  Meats and other protein foods  Fatty cuts of meat. Ribs, chicken wings, morfin, sausage, bologna, salami, chitterlings, fatback, hot dogs, bratwurst, and packaged lunch meats. Liver and organ meats. Whole eggs and egg yolks. Chicken and turkey with skin. Fried meat.  Dairy  Whole or 2% milk, cream, half-and-half, and cream cheese. Whole milk cheeses. Whole-fat or sweetened yogurt. Full-fat cheeses. Nondairy creamers and whipped toppings. Processed cheese, cheese spreads, and cheese curds.  Fats and oils  Butter, stick margarine, lard, shortening, ghee, or morfin fat. Coconut, palm kernel, and palm oils.  Beverages  Alcohol. Sugar-sweetened drinks such as sodas, lemonade, and fruit drinks.  Sweets and desserts  Corn syrup, sugars, honey, and molasses. Candy. Jam and jelly. Syrup. Sweetened cereals. Cookies, pies, cakes, donuts, muffins, and ice cream.  The items listed above may not be a complete list of foods and beverages you should avoid. Contact a dietitian for more information.  Summary  Choosing the right foods helps keep your fat and cholesterol at normal levels. This can keep you from getting certain diseases.  At meals, fill one-half of your plate with vegetables, green salads, and fruits.  Eat high fiber foods, like whole grains, beans, apples, pears, berries, carrots, peas, and barley.  Limit added sugar, saturated fats, alcohol, and fried foods.  This information is not intended to replace advice given to you by your health care provider. Make sure you discuss any questions you have with your health care provider.  Document Revised: 04/29/2022 Document Reviewed: 04/29/2022  Elsevier Patient Education © 2024 [employer]

## 2025-02-25 NOTE — PROGRESS NOTES
"Chief Complaint -cough    History of Present Illness -     June Melchor is a 69 y.o. female.     Cough-  Patient complains of productive cough, chest congestion, wheezing and chills that began 3 days ago.  She denies any fever.    Hypertension-  Controlled with losartan and diet    Hyperlipidemia-  Stable with fenofibrate and rosuvastatin      The following portions of the patient's history were reviewed and updated as appropriate: allergies, current medications, past family history, past medical history, past social history, past surgical history and problem list.        Objective  Vital signs:  /70   Pulse 76   Temp 97.8 °F (36.6 °C) (Temporal)   Ht 165.1 cm (65\")   Wt 67.6 kg (149 lb)   SpO2 96%   BMI 24.79 kg/m²     Physical Exam  Vitals and nursing note reviewed.   Constitutional:       General: She is not in acute distress.     Appearance: Normal appearance. She is well-developed. She is not diaphoretic.   HENT:      Head: Normocephalic and atraumatic.      Right Ear: Tympanic membrane, ear canal and external ear normal.      Left Ear: Tympanic membrane, ear canal and external ear normal.      Nose: Nose normal.      Mouth/Throat:      Mouth: Mucous membranes are moist.      Pharynx: No oropharyngeal exudate or posterior oropharyngeal erythema.   Neck:      Thyroid: No thyromegaly.   Cardiovascular:      Rate and Rhythm: Normal rate and regular rhythm.      Heart sounds: Normal heart sounds. No murmur heard.  Pulmonary:      Effort: Pulmonary effort is normal.      Breath sounds: No rales.      Comments: Bronchovesicular breath sounds noted diffusely  Musculoskeletal:      Cervical back: Normal range of motion and neck supple.   Lymphadenopathy:      Cervical: No cervical adenopathy.   Skin:     General: Skin is warm and dry.      Findings: No rash.   Neurological:      Mental Status: She is alert and oriented to person, place, and time.   Psychiatric:         Mood and Affect: Mood normal.       "   Behavior: Behavior normal.         Thought Content: Thought content normal.         The following data was reviewed by Paulette Wallis PA-C:         Lab Results   Component Value Date    BUN 27 (H) 01/30/2024    CREATININE 0.90 01/30/2024    EGFR 68.9 01/30/2024    ALT 13 01/30/2024    AST 22 01/30/2024    WBC 5.66 06/22/2018    HGB 14.6 06/22/2018    HCT 41.4 06/22/2018     06/22/2018    CHOL 158 01/30/2024    TRIG 100 01/30/2024    HDL 59 01/30/2024    LDL 81 01/30/2024    TSH 0.689 01/30/2024    HGBA1C 5.00 01/30/2024           Assessment & Plan     Diagnoses and all orders for this visit:    1. Bronchitis (Primary)  -     albuterol sulfate  (90 Base) MCG/ACT inhaler; Inhale 2 puffs Every 4 (Four) Hours As Needed for Wheezing.  Dispense: 18 g; Refill: 0  -     doxycycline (VIBRAMYCIN) 100 MG capsule; Take 1 capsule by mouth 2 (Two) Times a Day for 10 days.  Dispense: 20 capsule; Refill: 0    2. Benign essential hypertension  Comments:  Continue losartan  Advise daily BP and pulse monitoring    3. Mixed hyperlipidemia  Comments:  Continue fenofibrate and rosuvastatin  Advised a low-cholesterol diet        BMI is within normal parameters. No other follow-up for BMI required.          Patient was given instructions and counseling regarding his condition or for health maintenance advice. Please see specific information pulled into the AVS if appropriate      This document has been electronically signed by:  Paulette Wallis PA-C

## 2025-04-29 ENCOUNTER — LAB (OUTPATIENT)
Dept: FAMILY MEDICINE CLINIC | Facility: CLINIC | Age: 70
End: 2025-04-29
Payer: MEDICARE

## 2025-04-29 DIAGNOSIS — R73.03 PREDIABETES: Chronic | ICD-10-CM

## 2025-04-29 DIAGNOSIS — F51.04 PSYCHOPHYSIOLOGICAL INSOMNIA: ICD-10-CM

## 2025-04-29 DIAGNOSIS — E78.2 MIXED HYPERLIPIDEMIA: ICD-10-CM

## 2025-04-29 DIAGNOSIS — E03.4 HYPOTHYROIDISM DUE TO ACQUIRED ATROPHY OF THYROID: Chronic | ICD-10-CM

## 2025-04-29 DIAGNOSIS — F41.1 GENERALIZED ANXIETY DISORDER: Chronic | ICD-10-CM

## 2025-04-29 DIAGNOSIS — I10 BENIGN ESSENTIAL HYPERTENSION: ICD-10-CM

## 2025-04-29 LAB
ALBUMIN SERPL-MCNC: 3.9 G/DL (ref 3.5–5.2)
ALBUMIN/GLOB SERPL: 1.9 G/DL
ALP SERPL-CCNC: 80 U/L (ref 39–117)
ALT SERPL W P-5'-P-CCNC: 26 U/L (ref 1–33)
ANION GAP SERPL CALCULATED.3IONS-SCNC: 9.9 MMOL/L (ref 5–15)
AST SERPL-CCNC: 23 U/L (ref 1–32)
BILIRUB SERPL-MCNC: 0.4 MG/DL (ref 0–1.2)
BUN SERPL-MCNC: 27 MG/DL (ref 8–23)
BUN/CREAT SERPL: 34.2 (ref 7–25)
CALCIUM SPEC-SCNC: 9 MG/DL (ref 8.6–10.5)
CHLORIDE SERPL-SCNC: 102 MMOL/L (ref 98–107)
CHOLEST SERPL-MCNC: 166 MG/DL (ref 0–200)
CO2 SERPL-SCNC: 28.1 MMOL/L (ref 22–29)
CREAT SERPL-MCNC: 0.79 MG/DL (ref 0.57–1)
EGFRCR SERPLBLD CKD-EPI 2021: 80.1 ML/MIN/1.73
GLOBULIN UR ELPH-MCNC: 2.1 GM/DL
GLUCOSE SERPL-MCNC: 86 MG/DL (ref 65–99)
HBA1C MFR BLD: 5.2 % (ref 4.8–5.6)
HDLC SERPL-MCNC: 99 MG/DL (ref 40–60)
LDLC SERPL CALC-MCNC: 54 MG/DL (ref 0–100)
LDLC/HDLC SERPL: 0.54 {RATIO}
POTASSIUM SERPL-SCNC: 3.5 MMOL/L (ref 3.5–5.2)
PROT SERPL-MCNC: 6 G/DL (ref 6–8.5)
SODIUM SERPL-SCNC: 140 MMOL/L (ref 136–145)
T4 FREE SERPL-MCNC: 2.51 NG/DL (ref 0.92–1.68)
TRIGL SERPL-MCNC: 70 MG/DL (ref 0–150)
TSH SERPL DL<=0.05 MIU/L-ACNC: 0.03 UIU/ML (ref 0.27–4.2)
VLDLC SERPL-MCNC: 13 MG/DL (ref 5–40)

## 2025-04-29 PROCEDURE — 84443 ASSAY THYROID STIM HORMONE: CPT | Performed by: PHYSICIAN ASSISTANT

## 2025-04-29 PROCEDURE — 84439 ASSAY OF FREE THYROXINE: CPT | Performed by: PHYSICIAN ASSISTANT

## 2025-04-29 PROCEDURE — 36415 COLL VENOUS BLD VENIPUNCTURE: CPT

## 2025-04-29 PROCEDURE — 80053 COMPREHEN METABOLIC PANEL: CPT | Performed by: PHYSICIAN ASSISTANT

## 2025-04-29 PROCEDURE — 83036 HEMOGLOBIN GLYCOSYLATED A1C: CPT | Performed by: PHYSICIAN ASSISTANT

## 2025-04-29 PROCEDURE — 80061 LIPID PANEL: CPT | Performed by: PHYSICIAN ASSISTANT

## 2025-05-05 ENCOUNTER — OFFICE VISIT (OUTPATIENT)
Dept: FAMILY MEDICINE CLINIC | Facility: CLINIC | Age: 70
End: 2025-05-05
Payer: MEDICARE

## 2025-05-05 VITALS
TEMPERATURE: 98 F | DIASTOLIC BLOOD PRESSURE: 72 MMHG | HEIGHT: 65 IN | SYSTOLIC BLOOD PRESSURE: 118 MMHG | OXYGEN SATURATION: 97 % | BODY MASS INDEX: 24.49 KG/M2 | WEIGHT: 147 LBS | HEART RATE: 72 BPM

## 2025-05-05 DIAGNOSIS — I10 BENIGN ESSENTIAL HYPERTENSION: Chronic | ICD-10-CM

## 2025-05-05 DIAGNOSIS — E03.9 ACQUIRED HYPOTHYROIDISM: Chronic | ICD-10-CM

## 2025-05-05 DIAGNOSIS — Z00.00 MEDICARE ANNUAL WELLNESS VISIT, SUBSEQUENT: Primary | ICD-10-CM

## 2025-05-05 DIAGNOSIS — H61.21 IMPACTED CERUMEN OF RIGHT EAR: ICD-10-CM

## 2025-05-05 NOTE — PROGRESS NOTES
Subjective   The ABCs of the Annual Wellness Visit  Medicare Wellness Visit      June Melchor is a 69 y.o. patient who presents for a Medicare Wellness Visit.    The following portions of the patient's history were reviewed and   updated as appropriate: allergies, current medications, past family history, past medical history, past social history, past surgical history, and problem list.    Compared to one year ago, the patient's physical   health is the same.  Compared to one year ago, the patient's mental   health is the same.    Recent Hospitalizations:  She was not admitted to the hospital during the last year.     Current Medical Providers:  Patient Care Team:  Paulette Wallis PA as PCP - General (Family Medicine)    Outpatient Medications Prior to Visit   Medication Sig Dispense Refill    albuterol sulfate  (90 Base) MCG/ACT inhaler Inhale 2 puffs Every 4 (Four) Hours As Needed for Wheezing. 18 g 0    amitriptyline (ELAVIL) 100 MG tablet Take 1 tablet by mouth Every Night. 90 tablet 3    escitalopram (Lexapro) 10 MG tablet Take 1 tablet by mouth Daily. 90 tablet 3    famotidine (Pepcid) 40 MG tablet Take 1 tablet by mouth Daily. 90 tablet 3    fenofibrate (TRICOR) 145 MG tablet Take 1 tablet by mouth Daily. 90 tablet 3    hydrOXYzine (ATARAX) 50 MG tablet Take 1 tablet by mouth Every 8 (Eight) Hours As Needed for Anxiety. 180 tablet 3    levothyroxine (Synthroid) 125 MCG tablet Take 1 tablet by mouth Daily. 90 tablet 3    losartan (COZAAR) 100 MG tablet Take 1 tablet by mouth Daily. 90 tablet 3    rosuvastatin (CRESTOR) 20 MG tablet Take 1 tablet by mouth Every Night. 90 tablet 3     No facility-administered medications prior to visit.     No opioid medication identified on active medication list. I have reviewed chart for other potential  high risk medication/s and harmful drug interactions in the elderly.      Aspirin is not on active medication list.  Aspirin use is not indicated based on  "review of current medical condition/s. Risk of harm outweighs potential benefits.  .    Patient Active Problem List   Diagnosis    Hyperlipidemia    Benign essential hypertension    Gastroesophageal reflux disease without esophagitis    Generalized anxiety disorder    Primary insomnia    Menopausal and postmenopausal disorder    Major depression, chronic    Family history of colon cancer    Family history of polyps in the colon    Hyperhidrosis    Chronic constipation    Bulge of cervical disc without myelopathy    Bulging of lumbar intervertebral disc    DDD (degenerative disc disease), lumbar    Hypothyroidism    Prediabetes    Allergic rhinitis    Cervical adenopathy     Advance Care Planning Advance Directive is not on file.  ACP discussion was held with the patient during this visit. Patient does not have an advance directive, information provided.            Objective   Vitals:    05/05/25 1029   BP: 118/72   BP Location: Right arm   Patient Position: Sitting   Cuff Size: Adult   Pulse: 72   Temp: 98 °F (36.7 °C)   TempSrc: Temporal   SpO2: 97%   Weight: 66.7 kg (147 lb)   Height: 165.1 cm (65\")   PainSc: 0-No pain       Estimated body mass index is 24.46 kg/m² as calculated from the following:    Height as of this encounter: 165.1 cm (65\").    Weight as of this encounter: 66.7 kg (147 lb).    BMI is within normal parameters. No other follow-up for BMI required.           Does the patient have evidence of cognitive impairment? No  Lab Results   Component Value Date    TRIG 70 04/29/2025    HDL 99 (H) 04/29/2025    LDL 54 04/29/2025    VLDL 13 04/29/2025    HGBA1C 5.20 04/29/2025                                                                                                Health  Risk Assessment    Smoking Status:  Social History     Tobacco Use   Smoking Status Never    Passive exposure: Never   Smokeless Tobacco Never     Alcohol Consumption:  Social History     Substance and Sexual Activity   Alcohol Use No "       Fall Risk Screen  STEADI Fall Risk Assessment was completed, and patient is at LOW risk for falls.Assessment completed on:2025    Depression Screening   Little interest or pleasure in doing things? Not at all   Feeling down, depressed, or hopeless? Not at all   PHQ-2 Total Score 0      Health Habits and Functional and Cognitive Screenin/5/2025    10:28 AM   Functional & Cognitive Status   Do you have difficulty preparing food and eating? No   Do you have difficulty bathing yourself, getting dressed or grooming yourself? No   Do you have difficulty using the toilet? No   Do you have difficulty moving around from place to place? No   Do you have trouble with steps or getting out of a bed or a chair? No   Current Diet Well Balanced Diet   Dental Exam Up to date   Eye Exam Up to date   Exercise (times per week) 3 times per week   Current Exercises Include Walking   Do you need help using the phone?  No   Are you deaf or do you have serious difficulty hearing?  No   Do you need help to go to places out of walking distance? No   Do you need help shopping? No   Do you need help preparing meals?  No   Do you need help with housework?  No   Do you need help with laundry? No   Do you need help taking your medications? No   Do you need help managing money? No   Do you ever drive or ride in a car without wearing a seat belt? No   Have you felt unusual stress, anger or loneliness in the last month? No   Who do you live with? Alone   If you need help, do you have trouble finding someone available to you? No   Have you been bothered in the last four weeks by sexual problems? No   Do you have difficulty concentrating, remembering or making decisions? Yes           Age-appropriate Screening Schedule:  Refer to the list below for future screening recommendations based on patient's age, sex and/or medical conditions. Orders for these recommended tests are listed in the plan section. The patient has been provided  with a written plan.    Health Maintenance List  Health Maintenance   Topic Date Due    COVID-19 Vaccine (4 - 2024-25 season) 09/01/2024    INFLUENZA VACCINE  07/01/2025    LIPID PANEL  04/29/2026    ANNUAL WELLNESS VISIT  05/05/2026    MAMMOGRAM  08/09/2026    DXA SCAN  08/09/2026    COLORECTAL CANCER SCREENING  03/19/2027    TDAP/TD VACCINES (4 - Td or Tdap) 04/06/2034    HEPATITIS C SCREENING  Completed    Pneumococcal Vaccine 50+  Completed    ZOSTER VACCINE  Completed                                                                                                                                                CMS Preventative Services Quick Reference  Risk Factors Identified During Encounter  Depression/Dysphoria: Current medication is effective, no change recommended  Immunizations Discussed/Encouraged: COVID19  Polypharmacy: Medication List reviewed    The above risks/problems have been discussed with the patient.  Pertinent information has been shared with the patient in the After Visit Summary.  An After Visit Summary and PPPS were made available to the patient.    Follow Up:   Next Medicare Wellness visit to be scheduled in 1 year.         Additional E&M Note during same encounter follows:  Patient has additional, significant, and separately identifiable condition(s)/problem(s) that require work above and beyond the Medicare Wellness Visit     Chief Complaint  Right ear discomfort    Subjective   HPI  [place] is also being seen today for additional medical problem/s.    Right ear discomfort-  Patient complains of discomfort and a crackling sound in her right ear when she moves her jaw.  Onset 3 days.    Hypothyroidism-  Patient's lab work showed overactive thyroid levels.  Patient states that she has good energy level and has been walking.  She is currently on Synthroid 125 mcg daily.  Patient declines referral to endocrinology or changing medication dose at this time.    Hypertension-  Controlled with  "losartan        Objective   Vital Signs:  /72 (BP Location: Right arm, Patient Position: Sitting, Cuff Size: Adult)   Pulse 72   Temp 98 °F (36.7 °C) (Temporal)   Ht 165.1 cm (65\")   Wt 66.7 kg (147 lb)   SpO2 97%   BMI 24.46 kg/m²   Physical Exam  Vitals and nursing note reviewed.   Constitutional:       General: She is not in acute distress.     Appearance: Normal appearance. She is well-developed. She is not diaphoretic.   HENT:      Head: Normocephalic and atraumatic.      Right Ear: Tympanic membrane, ear canal and external ear normal. There is impacted cerumen.      Left Ear: Tympanic membrane, ear canal and external ear normal.      Ears:      Comments: After cerumen was excised from right external ear canal the TM appeared normal without perforation erythema or bulging.     Nose: Nose normal.      Mouth/Throat:      Mouth: Mucous membranes are moist.      Pharynx: No oropharyngeal exudate or posterior oropharyngeal erythema.   Eyes:      Extraocular Movements: Extraocular movements intact.      Conjunctiva/sclera: Conjunctivae normal.   Neck:      Thyroid: No thyromegaly.   Cardiovascular:      Rate and Rhythm: Normal rate and regular rhythm.      Heart sounds: Normal heart sounds. No murmur heard.  Pulmonary:      Effort: Pulmonary effort is normal. No respiratory distress.      Breath sounds: Normal breath sounds. No wheezing or rales.   Musculoskeletal:         General: No tenderness.      Cervical back: Normal range of motion and neck supple.   Lymphadenopathy:      Cervical: No cervical adenopathy.   Skin:     General: Skin is warm and dry.      Findings: No rash.   Neurological:      Mental Status: She is alert and oriented to person, place, and time.   Psychiatric:         Mood and Affect: Mood normal.         Behavior: Behavior normal.         Thought Content: Thought content normal.              Assessment and Plan      Diagnoses and all orders for this visit:    1. Medicare annual " wellness visit, subsequent (Primary)  Comments:  Performed and documented today    2. Impacted cerumen of right ear  Comments:  Cerumen excised from the right ear using flex loop curette by Paulette Wallis PA-C.  Patient tolerated the procedure well.    3. Acquired hypothyroidism  Comments:  Recheck thyroid labs in 3 months  Continue Synthroid 125 mcg daily  Orders:  -     T4, Free; Future  -     TSH; Future  -     T3, free; Future    4. Benign essential hypertension  Comments:  Continue losartan  Advise daily BP and pulse monitoring  Orders:  -     T4, Free; Future  -     TSH; Future  -     T3, free; Future      Orders Placed This Encounter   Procedures    T4, Free     Standing Status:   Future     Expected Date:   6/16/2025     Expiration Date:   5/5/2026     Release to patient:   Routine Release [[id number]]    TSH     Standing Status:   Future     Expected Date:   6/16/2025     Expiration Date:   5/5/2026     Release to patient:   Routine Release [[id number]]    T3, free     Standing Status:   Future     Expected Date:   5/10/2025     Expiration Date:   8/5/2026     Release to patient:   Routine Release [[id number]]             Follow Up   Return in about 3 months (around 8/5/2025) for Followup with Regi Caldwell just prior to return.  Patient was given instructions and counseling regarding her condition or for health maintenance advice. Please see specific information pulled into the AVS if appropriate.      This document has been electronically signed by:  Paulette Wallis PA-C

## 2025-05-05 NOTE — PATIENT INSTRUCTIONS
Advance Care Planning and Advance Directives     You make decisions on a daily basis - decisions about where you want to live, your career, your home, your life. Perhaps one of the most important decisions you face is your choice for future medical care. Take time to talk with your family and your healthcare team and start planning today.  Advance Care Planning is a process that can help you:  Understand possible future healthcare decisions in light of your own experiences  Reflect on those decision in light of your goals and values  Discuss your decisions with those closest to you and the healthcare professionals that care for you  Make a plan by creating a document that reflects your wishes    Surrogate Decision Maker  In the event of a medical emergency, which has left you unable to communicate or to make your own decisions, you would need someone to make decisions for you.  It is important to discuss your preferences for medical treatment with this person while you are in good health.     Qualities of a surrogate decision maker:  Willing to take on this role and responsibility  Knows what you want for future medical care  Willing to follow your wishes even if they don't agree with them  Able to make difficult medical decisions under stressful circumstances    Advance Directives  These are legal documents you can create that will guide your healthcare team and decision maker(s) when needed. These documents can be stored in the electronic medical record.    Living Will - a legal document to guide your care if you have a terminal condition or a serious illness and are unable to communicate. States vary by statute in document names/types, but most forms may include one or more of the following:        -  Directions regarding life-prolonging treatments        -  Directions regarding artificially provided nutrition/hydration        -  Choosing a healthcare decision maker        -  Direction regarding organ/tissue  donation    Durable Power of  for Healthcare - this document names an -in-fact to make medical decisions for you, but it may also allow this person to make personal and financial decisions for you. Please seek the advice of an  if you need this type of document.    **Advance Directives are not required and no one may discriminate against you if you do not sign one.    Medical Orders  Many states allow specific forms/orders signed by your physician to record your wishes for medical treatment in your current state of health. This form, signed in personal communication with your physician, addresses resuscitation and other medical interventions that you may or may not want.      For more information or to schedule a time with a [facility] Advance Care Planning Facilitator contact: [facility] or call [phone] and someone will contact you directly.  You are due for a Covid 19 vaccination. (provides protection against Covid 19 Viral Infection) Please  talk to your pharmacist and get the immunization at your local pharmacy at your earliest convenience. Please click on the link for more information about this vaccine.   https://www.cdc.gov/coronavirus/2019-ncov/vaccines/stay-up-to-date.html        Medicare Wellness  Personal Prevention Plan of Service     Date of Office Visit:    Encounter Provider:  EDD Vargas  Place of Service:  [facility]  Patient Name: June Melchor  :  1955    As part of the Medicare Wellness portion of your visit today, we are providing you with this personalized preventive plan of services (PPPS). This plan is based upon recommendations of the [place] Preventive Services Task Force (USPSTF) and the Advisory Committee on Immunization Practices (ACIP).    This lists the preventive care services that should be considered, and provides dates of when you are due. Items listed as completed are up-to-date  and do not require any further intervention.    Health Maintenance   Topic Date Due   • COVID-19 Vaccine (4 - 2024-25 season) 09/01/2024   • ANNUAL WELLNESS VISIT  02/06/2025   • INFLUENZA VACCINE  07/01/2025   • LIPID PANEL  04/29/2026   • MAMMOGRAM  08/09/2026   • DXA SCAN  08/09/2026   • COLORECTAL CANCER SCREENING  03/19/2027   • TDAP/TD VACCINES (4 - Td or Tdap) 04/06/2034   • HEPATITIS C SCREENING  Completed   • Pneumococcal Vaccine 50+  Completed   • ZOSTER VACCINE  Completed       Orders Placed This Encounter   Procedures   • T4, Free     Standing Status:   Future     Expected Date:   6/16/2025     Expiration Date:   5/5/2026     Release to patient:   Routine Release [[id number]]   • TSH     Standing Status:   Future     Expected Date:   6/16/2025     Expiration Date:   5/5/2026     Release to patient:   Routine Release [[id number]]   • T3, free     Standing Status:   Future     Expected Date:   5/10/2025     Expiration Date:   8/5/2026     Release to patient:   Routine Release [[id number]]       No follow-ups on file.

## 2025-08-05 ENCOUNTER — OFFICE VISIT (OUTPATIENT)
Dept: FAMILY MEDICINE CLINIC | Facility: CLINIC | Age: 70
End: 2025-08-05
Payer: MEDICARE

## 2025-08-05 VITALS
DIASTOLIC BLOOD PRESSURE: 68 MMHG | SYSTOLIC BLOOD PRESSURE: 82 MMHG | OXYGEN SATURATION: 100 % | BODY MASS INDEX: 24.46 KG/M2 | HEART RATE: 71 BPM | HEIGHT: 65 IN | TEMPERATURE: 98 F | WEIGHT: 146.8 LBS

## 2025-08-05 DIAGNOSIS — F41.1 GENERALIZED ANXIETY DISORDER: Chronic | ICD-10-CM

## 2025-08-05 DIAGNOSIS — E03.9 ACQUIRED HYPOTHYROIDISM: ICD-10-CM

## 2025-08-05 DIAGNOSIS — F51.04 PSYCHOPHYSIOLOGICAL INSOMNIA: ICD-10-CM

## 2025-08-05 DIAGNOSIS — E78.2 MIXED HYPERLIPIDEMIA: ICD-10-CM

## 2025-08-05 DIAGNOSIS — K21.9 GASTROESOPHAGEAL REFLUX DISEASE WITHOUT ESOPHAGITIS: Chronic | ICD-10-CM

## 2025-08-05 DIAGNOSIS — I95.9 HYPOTENSION, UNSPECIFIED HYPOTENSION TYPE: Primary | ICD-10-CM

## 2025-08-05 DIAGNOSIS — E03.4 HYPOTHYROIDISM DUE TO ACQUIRED ATROPHY OF THYROID: Chronic | ICD-10-CM

## 2025-08-05 PROCEDURE — 1126F AMNT PAIN NOTED NONE PRSNT: CPT | Performed by: PHYSICIAN ASSISTANT

## 2025-08-05 PROCEDURE — 99214 OFFICE O/P EST MOD 30 MIN: CPT | Performed by: PHYSICIAN ASSISTANT

## 2025-08-05 PROCEDURE — 1160F RVW MEDS BY RX/DR IN RCRD: CPT | Performed by: PHYSICIAN ASSISTANT

## 2025-08-05 PROCEDURE — 3074F SYST BP LT 130 MM HG: CPT | Performed by: PHYSICIAN ASSISTANT

## 2025-08-05 PROCEDURE — 1159F MED LIST DOCD IN RCRD: CPT | Performed by: PHYSICIAN ASSISTANT

## 2025-08-05 PROCEDURE — G2211 COMPLEX E/M VISIT ADD ON: HCPCS | Performed by: PHYSICIAN ASSISTANT

## 2025-08-05 PROCEDURE — 3078F DIAST BP <80 MM HG: CPT | Performed by: PHYSICIAN ASSISTANT

## 2025-08-05 RX ORDER — AMITRIPTYLINE HYDROCHLORIDE 100 MG/1
100 TABLET ORAL NIGHTLY
Qty: 90 TABLET | Refills: 3 | Status: SHIPPED | OUTPATIENT
Start: 2025-08-05

## 2025-08-05 RX ORDER — HYDROXYZINE HYDROCHLORIDE 50 MG/1
50 TABLET, FILM COATED ORAL EVERY 8 HOURS PRN
Qty: 180 TABLET | Refills: 3 | Status: SHIPPED | OUTPATIENT
Start: 2025-08-05

## 2025-08-05 RX ORDER — ESCITALOPRAM OXALATE 10 MG/1
10 TABLET ORAL DAILY
Qty: 90 TABLET | Refills: 3 | Status: SHIPPED | OUTPATIENT
Start: 2025-08-05

## 2025-08-05 RX ORDER — FENOFIBRATE 145 MG/1
145 TABLET, FILM COATED ORAL DAILY
Qty: 90 TABLET | Refills: 3 | Status: SHIPPED | OUTPATIENT
Start: 2025-08-05

## 2025-08-05 RX ORDER — FAMOTIDINE 40 MG/1
40 TABLET, FILM COATED ORAL DAILY
Qty: 90 TABLET | Refills: 3 | Status: SHIPPED | OUTPATIENT
Start: 2025-08-05

## 2025-08-05 RX ORDER — ROSUVASTATIN CALCIUM 20 MG/1
20 TABLET, COATED ORAL NIGHTLY
Qty: 90 TABLET | Refills: 3 | Status: SHIPPED | OUTPATIENT
Start: 2025-08-05

## 2025-08-05 RX ORDER — LEVOTHYROXINE SODIUM 125 UG/1
125 TABLET ORAL DAILY
Qty: 90 TABLET | Refills: 3 | Status: SHIPPED | OUTPATIENT
Start: 2025-08-05

## 2025-08-14 ENCOUNTER — OFFICE VISIT (OUTPATIENT)
Dept: FAMILY MEDICINE CLINIC | Facility: CLINIC | Age: 70
End: 2025-08-14
Payer: MEDICARE

## 2025-08-14 VITALS
SYSTOLIC BLOOD PRESSURE: 130 MMHG | WEIGHT: 146 LBS | OXYGEN SATURATION: 97 % | BODY MASS INDEX: 24.32 KG/M2 | TEMPERATURE: 97.9 F | HEART RATE: 55 BPM | DIASTOLIC BLOOD PRESSURE: 70 MMHG | HEIGHT: 65 IN

## 2025-08-14 DIAGNOSIS — E78.2 MIXED HYPERLIPIDEMIA: Chronic | ICD-10-CM

## 2025-08-14 DIAGNOSIS — E04.9 GOITER: ICD-10-CM

## 2025-08-14 DIAGNOSIS — E03.9 ACQUIRED HYPOTHYROIDISM: Primary | Chronic | ICD-10-CM

## 2025-08-14 DIAGNOSIS — E03.9 ACQUIRED HYPOTHYROIDISM: ICD-10-CM

## 2025-08-14 DIAGNOSIS — I10 BENIGN ESSENTIAL HYPERTENSION: Chronic | ICD-10-CM

## 2025-08-14 PROCEDURE — 84481 FREE ASSAY (FT-3): CPT | Performed by: PHYSICIAN ASSISTANT

## 2025-08-14 PROCEDURE — 36415 COLL VENOUS BLD VENIPUNCTURE: CPT | Performed by: PHYSICIAN ASSISTANT

## 2025-08-14 PROCEDURE — 84443 ASSAY THYROID STIM HORMONE: CPT | Performed by: PHYSICIAN ASSISTANT

## 2025-08-14 PROCEDURE — 84439 ASSAY OF FREE THYROXINE: CPT | Performed by: PHYSICIAN ASSISTANT

## 2025-08-15 DIAGNOSIS — E03.9 ACQUIRED HYPOTHYROIDISM: Primary | ICD-10-CM

## 2025-08-15 LAB
T3FREE SERPL-MCNC: 3.66 PG/ML (ref 2–4.4)
T4 FREE SERPL-MCNC: 2.41 NG/DL (ref 0.92–1.68)
TSH SERPL DL<=0.05 MIU/L-ACNC: 0.01 UIU/ML (ref 0.27–4.2)

## 2025-08-15 RX ORDER — LEVOTHYROXINE SODIUM 112 UG/1
112 TABLET ORAL
Qty: 90 TABLET | Refills: 3 | Status: SHIPPED | OUTPATIENT
Start: 2025-08-15

## 2025-08-18 ENCOUNTER — TELEPHONE (OUTPATIENT)
Dept: FAMILY MEDICINE CLINIC | Facility: CLINIC | Age: 70
End: 2025-08-18
Payer: MEDICARE

## 2025-08-19 ENCOUNTER — TELEPHONE (OUTPATIENT)
Dept: FAMILY MEDICINE CLINIC | Facility: CLINIC | Age: 70
End: 2025-08-19
Payer: MEDICARE

## 2025-08-21 ENCOUNTER — TELEPHONE (OUTPATIENT)
Dept: FAMILY MEDICINE CLINIC | Facility: CLINIC | Age: 70
End: 2025-08-21
Payer: MEDICARE

## 2025-08-25 ENCOUNTER — TELEPHONE (OUTPATIENT)
Dept: FAMILY MEDICINE CLINIC | Facility: CLINIC | Age: 70
End: 2025-08-25
Payer: MEDICARE